# Patient Record
Sex: MALE | Race: WHITE | NOT HISPANIC OR LATINO | Employment: OTHER | URBAN - METROPOLITAN AREA
[De-identification: names, ages, dates, MRNs, and addresses within clinical notes are randomized per-mention and may not be internally consistent; named-entity substitution may affect disease eponyms.]

---

## 2017-03-07 ENCOUNTER — OFFICE VISIT (OUTPATIENT)
Dept: MEDICAL GROUP | Facility: PHYSICIAN GROUP | Age: 57
End: 2017-03-07
Payer: COMMERCIAL

## 2017-03-07 VITALS
BODY MASS INDEX: 31.78 KG/M2 | SYSTOLIC BLOOD PRESSURE: 100 MMHG | RESPIRATION RATE: 16 BRPM | TEMPERATURE: 97.3 F | HEART RATE: 77 BPM | DIASTOLIC BLOOD PRESSURE: 68 MMHG | OXYGEN SATURATION: 98 % | WEIGHT: 222 LBS | HEIGHT: 70 IN

## 2017-03-07 DIAGNOSIS — E55.9 VITAMIN D INSUFFICIENCY: ICD-10-CM

## 2017-03-07 DIAGNOSIS — I10 ESSENTIAL HYPERTENSION: ICD-10-CM

## 2017-03-07 DIAGNOSIS — F33.41 RECURRENT MAJOR DEPRESSIVE DISORDER, IN PARTIAL REMISSION (HCC): ICD-10-CM

## 2017-03-07 DIAGNOSIS — K21.9 GASTROESOPHAGEAL REFLUX DISEASE, ESOPHAGITIS PRESENCE NOT SPECIFIED: ICD-10-CM

## 2017-03-07 DIAGNOSIS — E66.9 OBESITY (BMI 30.0-34.9): ICD-10-CM

## 2017-03-07 DIAGNOSIS — Z76.89 ENCOUNTER TO ESTABLISH CARE: ICD-10-CM

## 2017-03-07 DIAGNOSIS — Z12.5 SCREENING FOR PROSTATE CANCER: ICD-10-CM

## 2017-03-07 DIAGNOSIS — E11.9 TYPE 2 DIABETES MELLITUS WITHOUT COMPLICATION, WITHOUT LONG-TERM CURRENT USE OF INSULIN (HCC): ICD-10-CM

## 2017-03-07 DIAGNOSIS — R51.9 FREQUENT HEADACHES: ICD-10-CM

## 2017-03-07 DIAGNOSIS — Z00.00 ROUTINE HEALTH MAINTENANCE: ICD-10-CM

## 2017-03-07 PROCEDURE — 92250 FUNDUS PHOTOGRAPHY W/I&R: CPT | Mod: TC | Performed by: NURSE PRACTITIONER

## 2017-03-07 PROCEDURE — 99214 OFFICE O/P EST MOD 30 MIN: CPT | Performed by: NURSE PRACTITIONER

## 2017-03-07 RX ORDER — CITALOPRAM 20 MG/1
10 TABLET ORAL DAILY
Qty: 45 TAB | Refills: 3 | Status: SHIPPED | OUTPATIENT
Start: 2017-03-07 | End: 2018-04-17 | Stop reason: SDUPTHER

## 2017-03-07 RX ORDER — AMITRIPTYLINE HYDROCHLORIDE 10 MG/1
10 TABLET, FILM COATED ORAL
Qty: 90 TAB | Refills: 3 | Status: SHIPPED | OUTPATIENT
Start: 2017-03-07 | End: 2017-05-11 | Stop reason: SDUPTHER

## 2017-03-07 RX ORDER — OMEPRAZOLE 20 MG/1
20 CAPSULE, DELAYED RELEASE ORAL DAILY
Qty: 90 CAP | Refills: 3 | Status: SHIPPED | OUTPATIENT
Start: 2017-03-07 | End: 2018-04-17 | Stop reason: SDUPTHER

## 2017-03-07 RX ORDER — LOSARTAN POTASSIUM 100 MG/1
100 TABLET ORAL DAILY
Qty: 90 TAB | Refills: 3 | Status: SHIPPED | OUTPATIENT
Start: 2017-03-07 | End: 2018-04-17 | Stop reason: SDUPTHER

## 2017-03-07 ASSESSMENT — PATIENT HEALTH QUESTIONNAIRE - PHQ9
3. TROUBLE FALLING OR STAYING ASLEEP OR SLEEPING TOO MUCH: 0
1. LITTLE INTEREST OR PLEASURE IN DOING THINGS: 1
2. FEELING DOWN, DEPRESSED, IRRITABLE, OR HOPELESS: 1
9. THOUGHTS THAT YOU WOULD BE BETTER OFF DEAD, OR OF HURTING YOURSELF: 0
SUM OF ALL RESPONSES TO PHQ QUESTIONS 1-9: 3
SUM OF ALL RESPONSES TO PHQ9 QUESTIONS 1 AND 2: 2
7. TROUBLE CONCENTRATING ON THINGS, SUCH AS READING THE NEWSPAPER OR WATCHING TELEVISION: 0
6. FEELING BAD ABOUT YOURSELF - OR THAT YOU ARE A FAILURE OR HAVE LET YOURSELF OR YOUR FAMILY DOWN: 1
8. MOVING OR SPEAKING SO SLOWLY THAT OTHER PEOPLE COULD HAVE NOTICED. OR THE OPPOSITE, BEING SO FIGETY OR RESTLESS THAT YOU HAVE BEEN MOVING AROUND A LOT MORE THAN USUAL: 0
4. FEELING TIRED OR HAVING LITTLE ENERGY: 0
5. POOR APPETITE OR OVEREATING: 0

## 2017-03-07 NOTE — ASSESSMENT & PLAN NOTE
Ongoing problem managed with losartan 100 mg daily. Does not monitor bp at home. Denies any dizziness. +headaches. Denies any chest pain, palpitations, FLORES, or LE edema.

## 2017-03-07 NOTE — ASSESSMENT & PLAN NOTE
Ongoing problem since 2004. He had a fall off of a ladder and had head injury with multiple other injuries. This caused him to fall into bad state of depression. This has been well controlled with citalopram 10 mg daily.    Patient Health Questionaire    Little interest or pleasure in doing things?: 1  Feeling down, depressed, or hopeless?: 1  PHQ 2 Score: 2    If depressive symptoms identified deferred to follow up visit unless specifically addressed in assesment and plan.    Interpretation of PHQ-9 Total Score   Score Severity   1-4 Minimal Depression   5-9 Mild Depression   10-14 Moderate Depression   15-19 Moderately Severe Depression   20-27 Severe Depression

## 2017-03-07 NOTE — PROGRESS NOTES
Subjective:     Chief Complaint   Patient presents with   • Establish Care     med refills, daily headaches       HPI  Miguel Angel Fernández is a 56 y.o. male here today to establish care. The following chronic conditions were discussed:    ACID REFLUX  States he has had heartburn symptoms since 2015. Has been using omeprazole 20 mg daily which has been working very well. Denies any hoarseness or dysphagia. No abd pain or blood in stool.     Type 2 diabetes mellitus without complication, without long-term current use of insulin (CMS-[facility])  Ongoing problem managed with metformin 500 mg BID with meals. Denies any polyuria, polydipsia, polyphagia, blurred or cloudy vision, rash or thrush, or numbness or tingling of feet. Last dilated retinal eye exam was 2 years ago.   He does wish he could learn to eat better. Has a hard time finding time to exercise.   Losartan for renal protection.    Ref. Range 11/7/2015 08:45 7/7/2016 08:26 11/23/2016 07:51   Glycohemoglobin Latest Ref Range: 0.0-5.6 % 6.6 (H) 6.5 (H) 6.3 (H)       HTN (hypertension)  Ongoing problem managed with losartan 100 mg daily. Does not monitor bp at home. Denies any dizziness. +headaches. Denies any chest pain, palpitations, FLORES, or LE edema.     DEPRESSION  Ongoing problem since 2004. He had a fall off of a ladder and had head injury with multiple other injuries. This caused him to fall into bad state of depression. This has been well controlled with citalopram 10 mg daily.    Patient Health Questionaire    Little interest or pleasure in doing things?: 1  Feeling down, depressed, or hopeless?: 1  PHQ 2 Score: 2    If depressive symptoms identified deferred to follow up visit unless specifically addressed in assesment and plan.    Interpretation of PHQ-9 Total Score   Score Severity   1-4 Minimal Depression   5-9 Mild Depression   10-14 Moderate Depression   15-19 Moderately Severe Depression   20-27 Severe Depression      Frequent headaches  Long-standing  problem for years. States this has been going on since prior to his fall with head injury in 2004. He was previously following Dr. Bowser for nerve ablation and botox. Nothing has helped. Patient feels like this one muscle starts throbbing causing pain which radiates into his frontal head region. Pain is worse in mid afternoon. No photophobia/phonophobia, N/V. Treatments tried: multiple tabs of Excedrin daily. He has tried muscle relaxer but cannot recall which one. CT scan has been completed in the past and has been normal. No TIA or stroke-like symptoms, no focal or changing headaches, no facial/extremity paraesthesia, no amaurosis or  diplopia, no speech or swallowing difficulty, no focal or persistent numbness or weakness, no stumbling while walking, gait changes, or dizziness      Obesity (BMI 30.0-34.9)  Patient has been gaining weight over this past year. He feels like he needs assistance with managing his diet and is willing to purchase at the nutrition counseling. He does not routinely exercise         Diagnoses of Encounter to establish care, Frequent headaches, Type 2 diabetes mellitus without complication, without long-term current use of insulin (CMS-[facility]), Gastroesophageal reflux disease, esophagitis presence not specified, Essential hypertension, Recurrent major depressive disorder, in partial remission (CMS-[facility]), Vitamin D insufficiency, Obesity (BMI 30.0-34.9), Routine health maintenance, and Screening for prostate cancer were pertinent to this visit.    Allergies: Review of patient's allergies indicates no known allergies.  Current medicines (including changes today)  Current Outpatient Prescriptions   Medication Sig Dispense Refill   • metformin (GLUCOPHAGE) 500 MG Tab Take 1 Tab by mouth 2 times a day, with meals. 180 Tab 3   • omeprazole (PRILOSEC) 20 MG delayed-release capsule Take 1 Cap by mouth every day. 90 Cap 3   • losartan (COZAAR) 100 MG Tab Take 1 Tab by mouth every day. 90 Tab 3   •  "amitriptyline (ELAVIL) 10 MG Tab Take 1 Tab by mouth every bedtime. 90 Tab 3   • citalopram (CELEXA) 20 MG Tab Take 0.5 Tabs by mouth every day. 45 Tab 3     No current facility-administered medications for this visit.       He  has a past medical history of Hypertension; Heart burn; Hiatus hernia syndrome; Snoring; Rosacea (1/26/2016); Hyperglycemia (1/26/2016); Frequent headaches (1/26/2016); Indigestion; Bowel habit changes; Diabetes (CMS-[facility]); Sleep apnea; and Psychiatric problem.      ROS  As stated in HPI      Objective:     Blood pressure 100/68, pulse 77, temperature 36.3 °C (97.3 °F), resp. rate 16, height 1.778 m (5' 10\"), weight 100.699 kg (222 lb), SpO2 98 %. Body mass index is 31.85 kg/(m^2).  Physical Exam:  General: Alert, oriented, in no acute distress.  Eye contact is good, speech goal directed, affect calm  Neuro Exam: Cranial nerves 2-12 intact, strength intact all four extremities proximal and distal, sensation intact to soft touch all four extremities, gait normal  HEENT: conjunctiva non-injected, sclera non-icteric, EOMs intact.   Oral mucous membranes pink and moist with no lesions. Oropharynx without erythema, or exudate.   Neck: Neck with no visible deformity, edema, or erythema. No cervical lymphadenopathy.  No tenderness or spasm of paraspinous muscles or upper trapezius. No spinous process tenderness.  FAROM without pain through forward flexion, extension, left and right rotation, or ear to shoulder.  equal and strong bilat. FAROM of bilat shoulder and elbow. BUE strength 5/5.  Lungs: unlabored. clear to auscultation bilaterally with good excursion.  CV: regular rate and rhythm. No murmurs. No carotid bruits.   Ext: no edema, normal color and temperature.         Assessment and Plan:   The following treatment plan was discussed  1. Encounter to establish care     2. Frequent headaches  Not controlled. History sounds like they believes headaches were from cervical spine, but treatment " options were not working. Start Elavil and re-eval in 6 weeks. Refer to neurology per patient request   amitriptyline (ELAVIL) 10 MG Tab    REFERRAL TO NEUROLOGY   3. Type 2 diabetes mellitus without complication, without long-term current use of insulin (CMS-HCC)  Controlled on metformin. Continue losartan. Recommended aspirin. Will discuss statin at next visit after I receive updated labs  He will return tomorrow for retinal exam.   REFERRAL TO [facility] (HIP) Services Requested:: Weight Management Program, Registered Dietitian for Medical Nutrition Therapy; Reason for Visit:: Overweight/Obesity, Medical Condition Requiring Nutrition Counseling    metformin (GLUCOPHAGE) 500 MG Tab    HEMOGLOBIN A1C    COMP METABOLIC PANEL    LIPID PROFILE   4. Gastroesophageal reflux disease, esophagitis presence not specified  Stable with PPI  omeprazole (PRILOSEC) 20 MG delayed-release capsule   5. Essential hypertension  Controlled   losartan (COZAAR) 100 MG Tab   6. Recurrent major depressive disorder, in partial remission (CMS-HCC)  Controlled   citalopram (CELEXA) 20 MG Tab   7. Vitamin D insufficiency  VITAMIN D,25 HYDROXY   8. Obesity (BMI 30.0-34.9)  REFERRAL TO [facility] (HIP) Services Requested:: Weight Management Program, Registered Dietitian for Medical Nutrition Therapy; Reason for Visit:: Overweight/Obesity, Medical Condition Requiring Nutrition Counseling   9. Routine health maintenance  CBC WITH DIFFERENTIAL       Followup: Return in about 6 weeks (around 4/18/2017), or 6 wks headache, 6 months chronic disease. sooner should new symptoms or problems arise.

## 2017-03-07 NOTE — ASSESSMENT & PLAN NOTE
Ongoing problem managed with metformin 500 mg BID with meals. Denies any polyuria, polydipsia, polyphagia, blurred or cloudy vision, rash or thrush, or numbness or tingling of feet. Last dilated retinal eye exam was 2 years ago.   He does wish he could learn to eat better. Has a hard time finding time to exercise.   Losartan for renal protection.    Ref. Range 11/7/2015 08:45 7/7/2016 08:26 11/23/2016 07:51   Glycohemoglobin Latest Ref Range: 0.0-5.6 % 6.6 (H) 6.5 (H) 6.3 (H)

## 2017-03-07 NOTE — Clinical Note
March 7, 2017        Miguel Angel Fernández  [address] [place] [address]        Dear Miguel Angel Rutherford:    Please come back this week for a retinal eye exam because of your sugars and risk for eye damage.      Please start aspirin 81 mg every bedtime    For your headaches,   Please start elavil 10 mg every bedtime. Give this a total of 6 weeks to see if this will offer any improvement. I have also referred you to neurology clinic.      You may use Miralax for constipation. Please increase vegetables and prune juice. The nutritionist will also help with this.     If you have any questions or concerns, please don't hesitate to call.        Sincerely,        VIELKA De Jesus.    Electronically Signed

## 2017-03-07 NOTE — ASSESSMENT & PLAN NOTE
Long-standing problem for years. States this has been going on since prior to his fall with head injury in 2004. He was previously following Dr. Bowser for nerve ablation and botox. Nothing has helped. Patient feels like this one muscle starts throbbing causing pain which radiates into his frontal head region. Pain is worse in mid afternoon. No photophobia/phonophobia, N/V. Treatments tried: multiple tabs of Excedrin daily. He has tried muscle relaxer but cannot recall which one. CT scan has been completed in the past and has been normal.

## 2017-03-07 NOTE — MR AVS SNAPSHOT
"        Miguel Angel Fernández   3/7/2017 11:00 AM   Office Visit   MRN: [id number]    Department:  [facility]   Dept Phone:  [phone]    Description:  Male : 1960   Provider:  KLEBER De Jesus           Reason for Visit     Establish Care med refills, daily headaches      Allergies as of 3/7/2017     No Known Allergies      You were diagnosed with     Encounter to establish care   [475019]       Obesity (BMI 30.0-34.9)   [101629]       Gastroesophageal reflux disease, esophagitis presence not specified   [6463063]       Type 2 diabetes mellitus without complication, without long-term current use of insulin (CMS-HCC)   [9384996]       Essential hypertension   [4954309]       Recurrent major depressive disorder, in partial remission (CMS-HCC)   [4599032]       Frequent headaches   [8792338]       Vitamin D insufficiency   [895605]       Routine health maintenance   [490257]         Vital Signs     Blood Pressure Pulse Temperature Respirations Height Weight    100/68 mmHg 77 36.3 °C (97.3 °F) 16 1.778 m (5' 10\") 100.699 kg (222 lb)    Body Mass Index Oxygen Saturation Smoking Status             31.85 kg/m2 98% Former Smoker         Basic Information     Date Of Birth Sex Race Ethnicity Preferred Language    1960 Male White Non- English      Problem List              ICD-10-CM Priority Class Noted - Resolved    Depression F32.9   2010 - Present    Hypogonadism male E29.1   2010 - Present    ED (erectile dysfunction) N52.9   2010 - Present    FRANK on CPAP G47.33   2010 - Present    HTN (hypertension) I10   2010 - Present    Acid reflux K21.9   2010 - Present    Rosacea L71.9   2016 - Present    Type 2 diabetes mellitus without complication, without long-term current use of insulin (CMS-HCC) E11.9   2016 - Present    Frequent headaches R51   2016 - Present    Obesity (BMI 30.0-34.9) E66.9   3/7/2017 - Present      Health Maintenance       " Date Due Completion Dates    IMM HEP B VACCINE (1 of 3 - Primary Series) 1960 ---    RETINAL SCREENING 3/13/1976 ---    COLONOSCOPY 3/13/2008 ---    DIABETES MONOFILAMENT / LE EXAM 3/4/2016 3/4/2015 (N/S)    Override on 3/4/2015: (N/S)    IMM INFLUENZA (1) 9/1/2016 10/14/2015    FASTING LIPID PROFILE 11/7/2016 11/7/2015, 10/8/2012, 9/5/2008    URINE ACR / MICROALBUMIN 12/18/2016 12/18/2015    A1C SCREENING 5/23/2017 11/23/2016, 7/7/2016, 11/7/2015, 6/6/2015, 10/8/2012    SERUM CREATININE 12/23/2017 12/23/2016, 11/23/2016, 9/14/2016, 8/5/2016, 7/7/2016, 11/7/2015, 6/6/2015, 10/8/2012, 8/11/2010, 9/5/2008    IMM DTaP/Tdap/Td Vaccine (2 - Td) 1/26/2026 1/26/2016            Current Immunizations     Influenza Vaccine Quad Inj (Preserved) 10/14/2015    Tdap Vaccine 1/26/2016      Below and/or attached are the medications your provider expects you to take. Review all of your home medications and newly ordered medications with your provider and/or pharmacist. Follow medication instructions as directed by your provider and/or pharmacist. Please keep your medication list with you and share with your provider. Update the information when medications are discontinued, doses are changed, or new medications (including over-the-counter products) are added; and carry medication information at all times in the event of emergency situations     Allergies:  No Known Allergies          Medications  Valid as of: March 07, 2017 - 12:07 PM    Generic Name Brand Name Tablet Size Instructions for use    Amitriptyline HCl (Tab) ELAVIL 10 MG Take 1 Tab by mouth every bedtime.        Citalopram Hydrobromide (Tab) CELEXA 20 MG Take 0.5 Tabs by mouth every day.        Losartan Potassium (Tab) COZAAR 100 MG Take 1 Tab by mouth every day.        MetFORMIN HCl (Tab) GLUCOPHAGE 500 MG Take 1 Tab by mouth 2 times a day, with meals.        Omeprazole (CAPSULE DELAYED RELEASE) PRILOSEC 20 MG Take 1 Cap by mouth every day.        .                    Medicines prescribed today were sent to:     [facility] [place], [place] - [address]    [address] [address]    Phone: [phone] Fax: [phone]    Open 24 Hours?: No      Medication refill instructions:       If your prescription bottle indicates you have medication refills left, it is not necessary to call your provider’s office. Please contact your pharmacy and they will refill your medication.    If your prescription bottle indicates you do not have any refills left, you may request refills at any time through one of the following ways: The online [employer] system (except Urgent Care), by calling your provider’s office, or by asking your pharmacy to contact your provider’s office with a refill request. Medication refills are processed only during regular business hours and may not be available until the next business day. Your provider may request additional information or to have a follow-up visit with you prior to refilling your medication.   *Please Note: Medication refills are assigned a new Rx number when refilled electronically. Your pharmacy may indicate that no refills were authorized even though a new prescription for the same medication is available at the pharmacy. Please request the medicine by name with the pharmacy before contacting your provider for a refill.        Your To Do List     Future Labs/Procedures Complete By Expires    CBC WITH DIFFERENTIAL  As directed 3/8/2018    COMP METABOLIC PANEL  As directed 3/8/2018    HEMOGLOBIN A1C  As directed 3/8/2018    LIPID PROFILE  As directed 3/8/2018    VITAMIN D,25 HYDROXY  As directed 3/8/2018      Referral     A referral request has been sent to our patient care coordination department. Please allow 3-5 business days for us to process this request and contact you either by phone or mail. If you do not hear from us by the 5th business day, please call us at [phone].            [employer] Access Code: [email or web address]  Expires: 4/6/2017 10:50 AM    [employer]  A secure, online tool to manage your health information     [employer]’s [employer]® is a secure, online tool that connects you to your personalized health information from the privacy of your home -- day or night - making it very easy for you to manage your healthcare. Once the activation process is completed, you can even access your medical information using the [employer] elodia, which is available for free in the Apple Elodia store or Google Play store.     [employer] provides the following levels of access (as shown below):   My Chart Features   [facility] Primary Care Doctor [facility]  Specialists [facility]  Urgent  Care Non-[facility]  Primary Care  Doctor   Email your healthcare team securely and privately 24/7 X X X    Manage appointments: schedule your next appointment; view details of past/upcoming appointments X      Request prescription refills. X      View recent personal medical records, including lab and immunizations X X X X   View health record, including health history, allergies, medications X X X X   Read reports about your outpatient visits, procedures, consult and ER notes X X X X   See your discharge summary, which is a recap of your hospital and/or ER visit that includes your diagnosis, lab results, and care plan. X X       How to register for [employer]:  1. Go to  [email or web address].  2. Click on the Sign Up Now box, which takes you to the New Member Sign Up page. You will need to provide the following information:  a. Enter your [employer] Access Code exactly as it appears at the top of this page. (You will not need to use this code after you’ve completed the sign-up process. If you do not sign up before the expiration date, you must request a new code.)   b. Enter your date of birth.   c. Enter your home email address.   d. Click Submit, and follow the next screen’s instructions.  3. Create a [employer] ID. This will be your [employer]  login ID and cannot be changed, so think of one that is secure and easy to remember.  4. Create a [employer] password. You can change your password at any time.  5. Enter your Password Reset Question and Answer. This can be used at a later time if you forget your password.   6. Enter your e-mail address. This allows you to receive e-mail notifications when new information is available in [employer]  7. Click Sign Up. You can now view your health information.    For assistance activating your [employer] account, call [phone]

## 2017-03-07 NOTE — ASSESSMENT & PLAN NOTE
States he has had heartburn symptoms since 2015. Has been using omeprazole 20 mg daily which has been working very well. Denies any hoarseness or dysphagia. No abd pain or blood in stool.

## 2017-03-08 NOTE — ASSESSMENT & PLAN NOTE
Patient has been gaining weight over this past year. He feels like he needs assistance with managing his diet and is willing to purchase at the nutrition counseling. He does not routinely exercise

## 2017-03-20 ENCOUNTER — APPOINTMENT (OUTPATIENT)
Dept: HEALTH INFORMATION MANAGEMENT | Facility: MEDICAL CENTER | Age: 57
End: 2017-03-20
Payer: COMMERCIAL

## 2017-05-11 ENCOUNTER — OFFICE VISIT (OUTPATIENT)
Dept: NEUROLOGY | Facility: MEDICAL CENTER | Age: 57
End: 2017-05-11
Payer: COMMERCIAL

## 2017-05-11 VITALS
BODY MASS INDEX: 31.64 KG/M2 | SYSTOLIC BLOOD PRESSURE: 118 MMHG | DIASTOLIC BLOOD PRESSURE: 86 MMHG | WEIGHT: 221 LBS | HEIGHT: 70 IN | TEMPERATURE: 97.8 F | OXYGEN SATURATION: 95 % | HEART RATE: 86 BPM

## 2017-05-11 DIAGNOSIS — R51.9 FREQUENT HEADACHES: ICD-10-CM

## 2017-05-11 PROCEDURE — 99243 OFF/OP CNSLTJ NEW/EST LOW 30: CPT | Performed by: PHYSICIAN ASSISTANT

## 2017-05-11 RX ORDER — PROMETHAZINE HYDROCHLORIDE 25 MG/1
TABLET ORAL
Qty: 30 TAB | Refills: 11 | Status: SHIPPED | OUTPATIENT
Start: 2017-05-11 | End: 2018-10-08

## 2017-05-11 RX ORDER — AMITRIPTYLINE HYDROCHLORIDE 10 MG/1
10 TABLET, FILM COATED ORAL
Qty: 100 TAB | Refills: 11 | Status: SHIPPED | OUTPATIENT
Start: 2017-05-11 | End: 2017-11-22

## 2017-05-11 RX ORDER — ASCORBIC ACID 500 MG
500 TABLET ORAL DAILY
COMMUNITY
End: 2018-10-25

## 2017-05-11 RX ORDER — SUMATRIPTAN 50 MG/1
TABLET, FILM COATED ORAL
Qty: 9 TAB | Refills: 11 | Status: SHIPPED | OUTPATIENT
Start: 2017-05-11 | End: 2017-06-29 | Stop reason: SDUPTHER

## 2017-05-11 NOTE — PROGRESS NOTES
Subjective:      Miguel Angel Fernández is a 57 y.o. male who presents with New Patient    Chief Complaint/Reason for referral:  headaches    History of Present Illness:   Describe your headaches to me:  Has had headaches his whole life but in the past 11 years (since he moved to [place] from [place]) they have worsened.  In 2004 he fell off ladder and knocked himself out and since then he has had severe problems including depression and worsening of headaches.  Wakes in the night with headaches sometimes and other times his headache comes on during the day.  Moderate to Severe intensity, started more than 3 months ago, last > 4 hours at times.      Location of headaches:? Back of neck on right as well as his forehead    Frequency of headaches?  Not every day but typically daily and come on in the afternoon    Duration of headaches? Sometimes it doesn't go away all day despite taking excedrin    Do you get an aura or any symptoms that typically begin PRIOR to headache onset?    Are you nauseated or sick to your stomach when you have a headache?  n  Does light bother you when you have a headache?   ___n______  Does sound or noise bother you when you have a headache?   ____n_____    Neurologic symptoms with headaches (weakness, numbness, vertigo, speech changes, cognitive changes) no      Do you have any neck or jaw pain with headaches?    No neck injury but when he fell off the ladder he could have hurt his neck some.    Just saw dentist and dentist told him he has evidence of tooth grinding    Have you identified any triggers for your headaches (dehydration, poor sleep, low blood sugar, alcohol 35% (lisa wine) chocolate 22%, cheese 9%, citrus fruit 11%)?  Never noticed but he never kept diary    Do you feel restless like you want to pace around with your headaches or do you feel like lying down to make your headache feel better/less severe?   Worsened with activity    Do headaches start by coughing, sneezing, bending over,  Valsalva maneuver, sexual activity?  no      Do headaches start shortly after you lie down to go to bed or shortly after you get up from bed in the morning?  No pattern, although tendency to have daily headache every afternoon    Have you ever kept a headache diary? no    How many days do you keep ANY type of headache in any given month?  3 or fewer days______   Between 3 and 6 days______   Between 6 and 10 days_____  Between 11 and 14 days____  15 or more headache/days per month__X___  Has severe headaches __6__ days per month    Family members with headaches:  Mom and one sister    Co--morbid conditions:    Conditions that affect diagnosis and treatment:  Depression  Y but controlled now       Anxiety     Yes but has celexa    Sleep disorders:   No - the cpap helps with this       Obesity  Y    History of TBI Y see above           Social History:  Do you drink any caffeine? Yes__X___ No____   How many days per week?  2 or fewer days______  3 or more days__X____    Do you drink alcohol?  Rarely    Do you smoke cigarettes?     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 Works through headaches    Who and where do you live? [place] with your mom who is 90     What is your exercise program: none at this time    Have you had an MRI done?  No     PMH reviewed   Medications and Allergies Reviewed     What are you taking right now for your headaches/#days per month of acute medication use:   excedrin migraine - 7 days per week    Prior acute treatments:  Medication/dose/timing/route/worked or side-effects?  none    What are you taking right now - daily - to prevent headaches?/dose    Elavil 10 mg at bedtime      Any prior prophylactic treatments:  Medications/dose/frequency/duration of treatment/worked or side effects?    none                                                                                                             HPI    ROS       Objective:     /86  "mmHg  Pulse 86  Temp(Src) 36.6 °C (97.8 °F)  Ht 1.778 m (5' 10\")  Wt 100.245 kg (221 lb)  BMI 31.71 kg/m2  SpO2 95%     Physical Exam      Well developed, well nourished - vital signs reviewed  Alert and Oriented x 3, Affect Appropriate, Fund of knowledge within normal limits, Memory intact  Cranial Nerves: PERRL, EOMI without nystagmus or opthalmoplegia,   face symmetric in strength and sensation, no facial droop, tongue midline without atrophy or fasiculations, shoulder shrug is normal bilaterally, speech clear and fluent no aphasia  Motor:  Upper and lower extremities 5/5, equal bilaterally.  No drift.  Sensation: Light touch equal and intact in all extremities, No sensory deficits  DTRs: Normal in both upper and lower extremity.  No spasticity.  Cerebellar:  Finger to nose intact.  No dysmetria.  No tremor.  Gait: normal gait without ataxia.  Negative Romberg            Assessment/Plan:     Plan: New Daily Persistent Headache/Chronic Migraine/Medication Overuse Headache    Acute/Rescue Medications: max use 9 days monthly - use calendar to track and bring to next visit    Triptan - sumatriptan 50 mg at headache onset.  Nausea medication - phenergan 25 mg to take when you need to knock yourself out for unrelenting headache  OK to take excedrin same day as triptan    Daily Preventative Treatment:  Vitamins - handout provided  Daily medicine - increase elavil by 10 mg weekly to target dose of 50 mg at bedtime unless headache frequency is reduced prior  Exercise program:  Begin walking yoga or kody luiz      Other:  ONB and/or Trigger point injection -  Will obtain auth and call you when you received    Alternative medications - MMJ handout provided    Refer to dentist for TMJ evaluation - give you handout and you can call her to schedule an appointment            "

## 2017-05-11 NOTE — PATIENT INSTRUCTIONS
Plan: New Daily Persistent Headache/Chronic Migraine/Medication Overuse Headache    Acute/Rescue Medications: max use 9 days monthly - use calendar to track and bring to next visit    Triptan - sumatriptan 50 mg at headache onset.  Nausea medication - phenergan 25 mg to take when you need to knock yourself out for unrelenting headache  OK to take excedrin same day as triptan    Daily Preventative Treatment:  Vitamins - handout provided  Daily medicine - increase elavil by 10 mg weekly to target dose of 50 mg at bedtime unless headache frequency is reduced prior  Exercise program:  Begin walking yoga or kody luiz      Other:  ONB and/or Trigger point injection -  Will obtain auth and call you when you received    Alternative medications - MMJ handout provided    Refer to dentist for TMJ evaluation - give you handout and you can call her to schedule an appointment

## 2017-05-11 NOTE — MR AVS SNAPSHOT
"        Miguel Angel Fernández   2017 11:00 AM   Office Visit   MRN: [id number]    Department:  Neurology [facility]   Dept Phone:  [phone]    Description:  Male : 1960   Provider:  Alma Delia Olea PA-C           Reason for Visit     New Patient headaches      Allergies as of 2017     No Known Allergies      You were diagnosed with     Frequent headaches   [4109535]       Chronic migraine   [534481]         Vital Signs     Blood Pressure Pulse Temperature Height Weight Body Mass Index    118/86 mmHg 86 36.6 °C (97.8 °F) 1.778 m (5' 10\") 100.245 kg (221 lb) 31.71 kg/m2    Oxygen Saturation Smoking Status                95% Former Smoker          Basic Information     Date Of Birth Sex Race Ethnicity Preferred Language    1960 Male White Non- English      Your appointments     2017  1:00 PM   Follow [facility] Visit [facility]   [facility] (--)    [address], [address]  [facility] [address] [phone]           You will be receiving a confirmation call a few days before your appointment from our automated call confirmation system.              Problem List              ICD-10-CM Priority Class Noted - Resolved    Depression F32.9   2010 - Present    Hypogonadism male E29.1   2010 - Present    ED (erectile dysfunction) N52.9   2010 - Present    FRANK on CPAP G47.33, Z99.89   2010 - Present    HTN (hypertension) I10   2010 - Present    Acid reflux K21.9   2010 - Present    Rosacea L71.9   2016 - Present    Type 2 diabetes mellitus without complication, without long-term current use of insulin (CMS-[facility]) E11.9   2016 - Present    Frequent headaches R51   2016 - Present    Obesity (BMI 30.0-34.9) E66.9   3/7/2017 - Present    Chronic migraine G43.709   2017 - Present      Health Maintenance        Date Due Completion Dates    IMM HEP B VACCINE (1 of 3 - Primary Series) 1960 ---    IMM PNEUMOCOCCAL " 19-64 (ADULT) MEDIUM RISK SERIES (1 of 1 - PPSV23) 3/13/1977 ---    COLONOSCOPY 3/13/2008 ---    DIABETES MONOFILAMENT / LE EXAM 3/4/2016 3/4/2015 (N/S)    Override on 3/4/2015: (N/S)    FASTING LIPID PROFILE 11/7/2016 11/7/2015, 10/8/2012, 9/5/2008    URINE ACR / MICROALBUMIN 12/18/2016 12/18/2015    A1C SCREENING 5/23/2017 11/23/2016, 7/7/2016, 11/7/2015, 6/6/2015, 10/8/2012    SERUM CREATININE 12/23/2017 12/23/2016, 11/23/2016, 9/14/2016, 8/5/2016, 7/7/2016, 11/7/2015, 6/6/2015, 10/8/2012, 8/11/2010, 9/5/2008    RETINAL SCREENING 3/8/2018 3/8/2017, 3/7/2017 (Done)    Override on 3/7/2017: Done    IMM DTaP/Tdap/Td Vaccine (2 - Td) 1/26/2026 1/26/2016            Current Immunizations     Influenza Vaccine Quad Inj (Preserved) 10/14/2015    Tdap Vaccine 1/26/2016      Below and/or attached are the medications your provider expects you to take. Review all of your home medications and newly ordered medications with your provider and/or pharmacist. Follow medication instructions as directed by your provider and/or pharmacist. Please keep your medication list with you and share with your provider. Update the information when medications are discontinued, doses are changed, or new medications (including over-the-counter products) are added; and carry medication information at all times in the event of emergency situations     Allergies:  No Known Allergies          Medications  Valid as of: May 11, 2017 - 11:47 AM    Generic Name Brand Name Tablet Size Instructions for use    Amitriptyline HCl (Tab) ELAVIL 10 MG Take 1 Tab by mouth every bedtime. Increase by 10 mg weekly to target dose of 50 mg.        Ascorbic Acid (Tab) ascorbic acid 500 MG Take 500 mg by mouth every day.        Aspirin-Acetaminophen-Caffeine   Take  by mouth.        Citalopram Hydrobromide (Tab) CELEXA 20 MG Take 0.5 Tabs by mouth every day.        Losartan Potassium (Tab) COZAAR 100 MG Take 1 Tab by mouth every day.        Magnesium Chloride   Take   by mouth.        MetFORMIN HCl (Tab) GLUCOPHAGE 500 MG Take 1 Tab by mouth 2 times a day, with meals.        Omeprazole (CAPSULE DELAYED RELEASE) PRILOSEC 20 MG Take 1 Cap by mouth every day.        Potassium   Take  by mouth.        Promethazine HCl (Tab) PHENERGAN 25 MG 25 mg phenergan once on day with severe migraine.  Will cause sleepiness.  Do not drive.        SUMAtriptan Succinate (Tab) IMITREX 50 MG one tab at onset of headache.  Can repeat once in 24H if headache persists two hours after first dose.        .                 Medicines prescribed today were sent to:     [facility] [place], [place] - [address] [facility] [address] [facility]    [address] [facility] [address]    Phone: [phone] Fax: [phone]    Open 24 Hours?: No      Medication refill instructions:       If your prescription bottle indicates you have medication refills left, it is not necessary to call your provider’s office. Please contact your pharmacy and they will refill your medication.    If your prescription bottle indicates you do not have any refills left, you may request refills at any time through one of the following ways: The online [employer] system (except Urgent Care), by calling your provider’s office, or by asking your pharmacy to contact your provider’s office with a refill request. Medication refills are processed only during regular business hours and may not be available until the next business day. Your provider may request additional information or to have a follow-up visit with you prior to refilling your medication.   *Please Note: Medication refills are assigned a new Rx number when refilled electronically. Your pharmacy may indicate that no refills were authorized even though a new prescription for the same medication is available at the pharmacy. Please request the medicine by name with the pharmacy before contacting your provider for a refill.        Instructions    Plan: New Daily Persistent  Headache/Chronic Migraine/Medication Overuse Headache    Acute/Rescue Medications: max use 9 days monthly - use calendar to track and bring to next visit    Triptan - sumatriptan 50 mg at headache onset.  Nausea medication - phenergan 25 mg to take when you need to knock yourself out for unrelenting headache  OK to take excedrin same day as triptan    Daily Preventative Treatment:  Vitamins - handout provided  Daily medicine - increase elavil by 10 mg weekly to target dose of 50 mg at bedtime unless headache frequency is reduced prior  Exercise program:  Begin walking yoga or kody luiz      Other:  ONB and/or Trigger point injection -  Will obtain auth and call you when you received    Alternative medications - MMJ handout provided    Refer to dentist for TMJ evaluation - give you handout and you can call her to schedule an appointment            [employer] Access Code: Activation code not generated  Current [employer] Status: Active

## 2017-06-28 ENCOUNTER — TELEPHONE (OUTPATIENT)
Dept: NEUROLOGY | Facility: MEDICAL CENTER | Age: 57
End: 2017-06-28

## 2017-06-28 NOTE — TELEPHONE ENCOUNTER
"Pt calling, asking if he can get a rx for sumatriptan to be more than 9 tabs a month. Informed pt that imitrex is only as needed it's not to be taken on a regular basis. He states he is no longer taking amitriptyline he was getting more frequent headaches at night while on this med. He has not followed through with the dentist referral, I gave him the # to dr. collier again. Also he is not interested in getting ONB's he stated he was getting botox and thinks what might have been onb's \"really painful back of neck injections\", With sweet water pain. Pt has a appt coming up in July. Requesting for me to see if he can get in sooner than that. I informed him I will keep an eye out for the same day cancellation appts and call him if one comes available.   "

## 2017-06-29 ENCOUNTER — OFFICE VISIT (OUTPATIENT)
Dept: NEUROLOGY | Facility: MEDICAL CENTER | Age: 57
End: 2017-06-29
Payer: COMMERCIAL

## 2017-06-29 VITALS
OXYGEN SATURATION: 93 % | SYSTOLIC BLOOD PRESSURE: 110 MMHG | HEART RATE: 94 BPM | HEIGHT: 70 IN | TEMPERATURE: 97.7 F | WEIGHT: 220.2 LBS | DIASTOLIC BLOOD PRESSURE: 82 MMHG | BODY MASS INDEX: 31.52 KG/M2

## 2017-06-29 DIAGNOSIS — R41.3 MEMORY CHANGE: ICD-10-CM

## 2017-06-29 PROCEDURE — 99214 OFFICE O/P EST MOD 30 MIN: CPT | Performed by: PHYSICIAN ASSISTANT

## 2017-06-29 RX ORDER — MEMANTINE HYDROCHLORIDE 5 MG-10 MG
1 KIT ORAL SEE ADMIN INSTRUCTIONS
Qty: 49 TAB | Refills: 0 | Status: SHIPPED | OUTPATIENT
Start: 2017-06-29 | End: 2017-08-15 | Stop reason: SDUPTHER

## 2017-06-29 RX ORDER — SUMATRIPTAN 100 MG/1
TABLET, FILM COATED ORAL
Qty: 27 TAB | Refills: 3 | Status: SHIPPED | OUTPATIENT
Start: 2017-06-29 | End: 2017-11-09 | Stop reason: SDUPTHER

## 2017-06-29 ASSESSMENT — PATIENT HEALTH QUESTIONNAIRE - PHQ9: CLINICAL INTERPRETATION OF PHQ2 SCORE: 1

## 2017-06-29 NOTE — PROGRESS NOTES
followup migraine    He started elavil titration and he couldn't tolerate it when he began taking more than 2 nightly.  Cannot do topamax because of hx of kidney stones  depakote he also tried and failed  Tried botox in past but only once        For acute medications:  Sumatriptan 50 mg is working well - max use 9 days monthly    He now has mouth guard    Lost headache diary - still having daily headaches.    botox authorization will be obtained and they will call you to schedule    This patient has chronic daily migraines, defined as having 15 or more headaches days per month over a minimum of the last three months. Episodes last more than 4 hours (untreated).     Previous prophylactic treatments for at least three months have included  anti-epileptics such as topamax and depakote and/or anti-depressants such as elavil. The patient has not responded to any of these treatments. At this point the only option left would be to use BTX injections for chronic migraine.    Will try namenda also while waiting for botox to kick in since last time it didn't work after just one injection.    Total time with this visit:  25   Minutes face-to-face with patient. More than 50% of this visit was spent educating patient on their illness and/or coordinating care, as detailed above

## 2017-06-29 NOTE — MR AVS SNAPSHOT
"        Miguel Angel Fernández   2017 2:20 PM   Office Visit   MRN: [id number]    Department:  Neurology [facility]   Dept Phone:  [phone]    Description:  Male : 1960   Provider:  Alma Delia Olea PA-C           Reason for Visit     Follow-Up migraine      Allergies as of 2017     No Known Allergies      You were diagnosed with     Memory change   [[id number]]       Chronic migraine   [[id number]]         Vital Signs     Blood Pressure Pulse Temperature Height Weight Body Mass Index    110/82 mmHg 94 36.5 °C (97.7 °F) 1.778 m (5' 10\") 99.882 kg (220 lb 3.2 oz) 31.60 kg/m2    Oxygen Saturation Smoking Status                93% Former Smoker          Basic Information     Date Of Birth Sex Race Ethnicity Preferred Language    1960 Male White Non- English      Your appointments     2017  1:40 PM   [facility]   [facility] (--)    [address] [place] [address], [address]  [facility] [address] [phone]           You will be receiving a confirmation call a few days before your appointment from our automated call confirmation system.              Problem List              ICD-10-CM Priority Class Noted - Resolved    Depression F32.9   2010 - Present    Hypogonadism male E29.1   2010 - Present    ED (erectile dysfunction) N52.9   2010 - Present    FRANK on CPAP G47.33, Z99.89   2010 - Present    HTN (hypertension) I10   2010 - Present    Acid reflux K21.9   2010 - Present    Rosacea L71.9   2016 - Present    Type 2 diabetes mellitus without complication, without long-term current use of insulin (CMS-HCC) E11.9   2016 - Present    Frequent headaches R51   2016 - Present    Obesity (BMI 30.0-34.9) E66.9   3/7/2017 - Present    Chronic migraine G43.709   2017 - Present      Health Maintenance        Date Due Completion Dates    IMM HEP B VACCINE (1 of 3 - Primary Series) 1960 ---    IMM PNEUMOCOCCAL " 19-64 (ADULT) MEDIUM RISK SERIES (1 of 1 - PPSV23) 3/13/1977 ---    COLONOSCOPY 3/13/2008 ---    DIABETES MONOFILAMENT / LE EXAM 3/4/2016 3/4/2015 (N/S)    Override on 3/4/2015: (N/S)    FASTING LIPID PROFILE 11/7/2016 11/7/2015, 10/8/2012, 9/5/2008    URINE ACR / MICROALBUMIN 12/18/2016 12/18/2015    A1C SCREENING 5/23/2017 11/23/2016, 7/7/2016, 11/7/2015, 6/6/2015, 10/8/2012    SERUM CREATININE 12/23/2017 12/23/2016, 11/23/2016, 9/14/2016, 8/5/2016, 7/7/2016, 11/7/2015, 6/6/2015, 10/8/2012, 8/11/2010, 9/5/2008    RETINAL SCREENING 3/8/2018 3/8/2017, 3/7/2017 (Done)    Override on 3/7/2017: Done    IMM DTaP/Tdap/Td Vaccine (2 - Td) 1/26/2026 1/26/2016            Current Immunizations     Influenza Vaccine Quad Inj (Preserved) 10/14/2015    Tdap Vaccine 1/26/2016      Below and/or attached are the medications your provider expects you to take. Review all of your home medications and newly ordered medications with your provider and/or pharmacist. Follow medication instructions as directed by your provider and/or pharmacist. Please keep your medication list with you and share with your provider. Update the information when medications are discontinued, doses are changed, or new medications (including over-the-counter products) are added; and carry medication information at all times in the event of emergency situations     Allergies:  No Known Allergies          Medications  Valid as of: June 29, 2017 -  2:45 PM    Generic Name Brand Name Tablet Size Instructions for use    Amitriptyline HCl (Tab) ELAVIL 10 MG Take 1 Tab by mouth every bedtime. Increase by 10 mg weekly to target dose of 50 mg.        Ascorbic Acid (Tab) ascorbic acid 500 MG Take 500 mg by mouth every day.        Ascorbic Acid   Take  by mouth.        Aspirin-Acetaminophen-Caffeine   Take  by mouth.        Citalopram Hydrobromide (Tab) CELEXA 20 MG Take 0.5 Tabs by mouth every day.        Cyanocobalamin   Take  by mouth.        Losartan Potassium (Tab)  COZAAR 100 MG Take 1 Tab by mouth every day.        Magnesium Chloride   Take  by mouth.        Memantine HCl (Tab) Memantine HCl 5 (28)-10 (21) MG Take 1 Tab by mouth See Admin Instructions.        MetFORMIN HCl (Tab) GLUCOPHAGE 500 MG Take 1 Tab by mouth 2 times a day, with meals.        Omeprazole (CAPSULE DELAYED RELEASE) PRILOSEC 20 MG Take 1 Cap by mouth every day.        Potassium   Take  by mouth.        Promethazine HCl (Tab) PHENERGAN 25 MG 25 mg phenergan once on day with severe migraine.  Will cause sleepiness.  Do not drive.        SUMAtriptan Succinate (Tab) IMITREX 100 MG 1/2 to one tab at onset of headache.  Can repeat once in 24H if headache persists two hours after first dose.        .                 Medicines prescribed today were sent to:     [facility] - [place], [place] - [address] [facility]    [address] [facility] [address]    Phone: [phone] Fax: [phone]    Open 24 Hours?: No    [id number] - [place], [place] - [facility] [address]    Phone: [phone] Fax: [phone]    Open 24 Hours?: No      Medication refill instructions:       If your prescription bottle indicates you have medication refills left, it is not necessary to call your provider’s office. Please contact your pharmacy and they will refill your medication.    If your prescription bottle indicates you do not have any refills left, you may request refills at any time through one of the following ways: The online [employer] system (except Urgent Care), by calling your provider’s office, or by asking your pharmacy to contact your provider’s office with a refill request. Medication refills are processed only during regular business hours and may not be available until the next business day. Your provider may request additional information or to have a follow-up visit with you prior to refilling your medication.   *Please Note: Medication  refills are assigned a new Rx number when refilled electronically. Your pharmacy may indicate that no refills were authorized even though a new prescription for the same medication is available at the pharmacy. Please request the medicine by name with the pharmacy before contacting your provider for a refill.           [employer] Access Code: Activation code not generated  Current [employer] Status: Active

## 2017-06-30 DIAGNOSIS — G43.709 CHRONIC MIGRAINE W/O AURA W/O STATUS MIGRAINOSUS, NOT INTRACTABLE: ICD-10-CM

## 2017-07-03 ENCOUNTER — TELEPHONE (OUTPATIENT)
Dept: NEUROLOGY | Facility: MEDICAL CENTER | Age: 57
End: 2017-07-03

## 2017-07-03 NOTE — TELEPHONE ENCOUNTER
Received a fax from pt's local pharmacy, regarding rx for Namenda titration pack, they want more dosing clarification then just see admin instructions.

## 2017-07-05 NOTE — TELEPHONE ENCOUNTER
"Message  Received: Today       HALI Chaparro, Micky Ass't       Caller: Unspecified (2 days ago, 1:24 PM)                     Please call them back as this makes NO sense.  The titration pack comes with detailed instructions and is divided by weeks and how to take it.  That is why I said \"see admin instructions.\"       Alma Delia spoke with the pharmacist and clarified.  "

## 2017-07-24 ENCOUNTER — APPOINTMENT (OUTPATIENT)
Dept: NEUROLOGY | Facility: MEDICAL CENTER | Age: 57
End: 2017-07-24
Payer: COMMERCIAL

## 2017-08-15 ENCOUNTER — OFFICE VISIT (OUTPATIENT)
Dept: NEUROLOGY | Facility: MEDICAL CENTER | Age: 57
End: 2017-08-15
Payer: COMMERCIAL

## 2017-08-15 VITALS
WEIGHT: 220.9 LBS | HEART RATE: 85 BPM | TEMPERATURE: 97.7 F | HEIGHT: 70 IN | BODY MASS INDEX: 31.62 KG/M2 | SYSTOLIC BLOOD PRESSURE: 114 MMHG | DIASTOLIC BLOOD PRESSURE: 82 MMHG | OXYGEN SATURATION: 94 %

## 2017-08-15 DIAGNOSIS — R41.3 MEMORY CHANGE: ICD-10-CM

## 2017-08-15 PROCEDURE — 64615 CHEMODENERV MUSC MIGRAINE: CPT | Performed by: PHYSICIAN ASSISTANT

## 2017-08-15 RX ORDER — MEMANTINE HYDROCHLORIDE 10 MG/1
10 TABLET ORAL 2 TIMES DAILY
Qty: 60 TAB | Refills: 11 | Status: SHIPPED | OUTPATIENT
Start: 2017-08-15 | End: 2018-10-08

## 2017-08-15 RX ORDER — ZOLMITRIPTAN 5 MG/1
1 SPRAY NASAL
Qty: 10 EACH | Refills: 11 | Status: SHIPPED | OUTPATIENT
Start: 2017-08-15 | End: 2018-05-09

## 2017-08-15 RX ORDER — MEMANTINE HYDROCHLORIDE 5 MG-10 MG
1 KIT ORAL SEE ADMIN INSTRUCTIONS
Qty: 49 TAB | Refills: 0 | Status: SHIPPED | OUTPATIENT
Start: 2017-08-15 | End: 2018-05-09

## 2017-08-15 RX ORDER — KETOROLAC TROMETHAMINE 30 MG/ML
60 INJECTION, SOLUTION INTRAMUSCULAR; INTRAVENOUS ONCE
Status: COMPLETED | OUTPATIENT
Start: 2017-08-15 | End: 2017-08-15

## 2017-08-15 RX ADMIN — KETOROLAC TROMETHAMINE 60 MG: 30 INJECTION, SOLUTION INTRAMUSCULAR; INTRAVENOUS at 17:13

## 2017-08-15 NOTE — PROGRESS NOTES
I treated this patient in clinic today with BotoxA 155 units according to the dosing/injection paradigm currently mandated by the FDA for the management of chronic migraine. Specifically, I injected 5 units to the procerus, 5 units to the corrugators bilaterally, a total of 20 units to the frontalis musculature, 20 units to the temporalis bilaterally, 15 units to the occipitalis bilaterally, 10 units to the cervical paraspinals bilaterally and 15 units to the trapezius musculature bilaterally. The remainder of the Botox 200 units mixed but not administered was discarded as wastage per FDA guidelines.    MA administered toradol

## 2017-08-15 NOTE — MR AVS SNAPSHOT
"        Miguel Angel Fernández   8/15/2017 4:40 PM   Office Visit   MRN: [id number]    Department:  Neurology [facility]   Dept Phone:  [phone]    Description:  Male : 1960   Provider:  Alma Delia Olea PA-C           Reason for Visit     Botox Injection chronic migraine      Allergies as of 8/15/2017     No Known Allergies      You were diagnosed with     Chronic migraine   [807537]       Memory change   [128477]         Vital Signs     Blood Pressure Pulse Temperature Height Weight Body Mass Index    114/82 mmHg 85 36.5 °C (97.7 °F) 1.778 m (5' 10\") 100.2 kg (220 lb 14.4 oz) 31.70 kg/m2    Oxygen Saturation Smoking Status                94% Former Smoker          Basic Information     Date Of Birth Sex Race Ethnicity Preferred Language    1960 Male White Non- English      Your appointments     Aug 23, 2017 11:20 AM   Follow Up Visit with [facility]   [facility] (--)    [address] [place] [address], [address]  [facility] [address] [phone]           You will be receiving a confirmation call a few days before your appointment from our automated call confirmation system.              Problem List              ICD-10-CM Priority Class Noted - Resolved    Depression F32.9   2010 - Present    Hypogonadism male E29.1   2010 - Present    ED (erectile dysfunction) N52.9   2010 - Present    FRANK on CPAP G47.33, Z99.89   2010 - Present    HTN (hypertension) I10   2010 - Present    Acid reflux K21.9   2010 - Present    Rosacea L71.9   2016 - Present    Type 2 diabetes mellitus without complication, without long-term current use of insulin (CMS-HCC) E11.9   2016 - Present    Frequent headaches R51   2016 - Present    Obesity (BMI 30.0-34.9) E66.9   3/7/2017 - Present    Chronic migraine G43.709   2017 - Present      Health Maintenance        Date Due Completion Dates    IMM HEP B VACCINE (1 of 3 - Primary Series) 1960 ---    IMM " PNEUMOCOCCAL 19-64 (ADULT) MEDIUM RISK SERIES (1 of 1 - PPSV23) 3/13/1977 ---    COLONOSCOPY 3/13/2008 ---    DIABETES MONOFILAMENT / LE EXAM 3/4/2016 3/4/2015 (N/S)    Override on 3/4/2015: (N/S)    FASTING LIPID PROFILE 11/7/2016 11/7/2015, 10/8/2012, 9/5/2008    URINE ACR / MICROALBUMIN 12/18/2016 12/18/2015    A1C SCREENING 5/23/2017 11/23/2016, 7/7/2016, 11/7/2015, 6/6/2015, 10/8/2012    IMM INFLUENZA (1) 9/1/2017 10/14/2015    SERUM CREATININE 12/23/2017 12/23/2016, 11/23/2016, 9/14/2016, 8/5/2016, 7/7/2016, 11/7/2015, 6/6/2015, 10/8/2012, 8/11/2010, 9/5/2008    RETINAL SCREENING 3/8/2018 3/8/2017, 3/7/2017 (Done)    Override on 3/7/2017: Done    IMM DTaP/Tdap/Td Vaccine (2 - Td) 1/26/2026 1/26/2016            Current Immunizations     Influenza Vaccine Quad Inj (Preserved) 10/14/2015    Tdap Vaccine 1/26/2016      Below and/or attached are the medications your provider expects you to take. Review all of your home medications and newly ordered medications with your provider and/or pharmacist. Follow medication instructions as directed by your provider and/or pharmacist. Please keep your medication list with you and share with your provider. Update the information when medications are discontinued, doses are changed, or new medications (including over-the-counter products) are added; and carry medication information at all times in the event of emergency situations     Allergies:  No Known Allergies          Medications  Valid as of: August 15, 2017 -  5:15 PM    Generic Name Brand Name Tablet Size Instructions for use    Amitriptyline HCl (Tab) ELAVIL 10 MG Take 1 Tab by mouth every bedtime. Increase by 10 mg weekly to target dose of 50 mg.        Ascorbic Acid (Tab) ascorbic acid 500 MG Take 500 mg by mouth every day.        Ascorbic Acid   Take  by mouth.        Aspirin-Acetaminophen-Caffeine   Take  by mouth.        Citalopram Hydrobromide (Tab) CELEXA 20 MG Take 0.5 Tabs by mouth every day.         Cyanocobalamin   Take  by mouth.        Losartan Potassium (Tab) COZAAR 100 MG Take 1 Tab by mouth every day.        Magnesium Chloride   Take  by mouth.        Memantine HCl (Tab) Memantine HCl 5 (28)-10 (21) MG Take 1 Tab by mouth See Admin Instructions.        Memantine HCl (Tab) NAMENDA 10 MG Take 1 Tab by mouth 2 times a day.        MetFORMIN HCl (Tab) GLUCOPHAGE 500 MG Take 1 Tab by mouth 2 times a day, with meals.        Omeprazole (CAPSULE DELAYED RELEASE) PRILOSEC 20 MG Take 1 Cap by mouth every day.        Potassium   Take  by mouth.        Promethazine HCl (Tab) PHENERGAN 25 MG 25 mg phenergan once on day with severe migraine.  Will cause sleepiness.  Do not drive.        SUMAtriptan Succinate (Tab) IMITREX 100 MG 1/2 to one tab at onset of headache.  Can repeat once in 24H if headache persists two hours after first dose.        ZOLMitriptan (Solution) ZOMIG 5 MG Spray 1 Spray in nose Once PRN for Headache for up to 1 dose.        .                 Medicines prescribed today were sent to:     [facility] - [facility] [address] [facility]    [address] [facility] [address]    Phone: [phone] Fax: [phone]    Open 24 Hours?: No    [facility] #[id number] - [address] AT [address] [address]    Phone: [phone] Fax: [phone]    Open 24 Hours?: No      Medication refill instructions:       If your prescription bottle indicates you have medication refills left, it is not necessary to call your provider’s office. Please contact your pharmacy and they will refill your medication.    If your prescription bottle indicates you do not have any refills left, you may request refills at any time through one of the following ways: The online [employer] system (except Urgent Care), by calling your provider’s office, or by asking your pharmacy to contact your provider’s office with a refill request. Medication refills are  processed only during regular business hours and may not be available until the next business day. Your provider may request additional information or to have a follow-up visit with you prior to refilling your medication.   *Please Note: Medication refills are assigned a new Rx number when refilled electronically. Your pharmacy may indicate that no refills were authorized even though a new prescription for the same medication is available at the pharmacy. Please request the medicine by name with the pharmacy before contacting your provider for a refill.           [email or web address] Access Code: Activation code not generated  Current [email or web address] Status: Active

## 2017-11-07 ENCOUNTER — OFFICE VISIT (OUTPATIENT)
Dept: NEUROLOGY | Facility: MEDICAL CENTER | Age: 57
End: 2017-11-07
Payer: COMMERCIAL

## 2017-11-07 VITALS
WEIGHT: 221.6 LBS | TEMPERATURE: 98.6 F | HEIGHT: 70 IN | SYSTOLIC BLOOD PRESSURE: 130 MMHG | BODY MASS INDEX: 31.73 KG/M2 | OXYGEN SATURATION: 92 % | HEART RATE: 80 BPM | DIASTOLIC BLOOD PRESSURE: 92 MMHG

## 2017-11-07 PROCEDURE — 64615 CHEMODENERV MUSC MIGRAINE: CPT | Performed by: PHYSICIAN ASSISTANT

## 2017-11-08 NOTE — PROGRESS NOTES
I treated this patient in clinic today with BotoxA 155 units according to the dosing/injection paradigm currently mandated by the FDA for the management of chronic migraine. Specifically, I injected 5 units to the procerus, 5 units to the corrugators bilaterally, a total of 20 units to the frontalis musculature, 20 units to the temporalis bilaterally, 15 units to the occipitalis bilaterally, 10 units to the cervical paraspinals bilaterally and 15 units to the trapezius musculature bilaterally.    I also injected an additional 10 units into the occipital belly of the occipitofrontalis each side because pt complained of reduced efficacy or effect wearing off prior to next dose.    The remainder of the Botox 200 units mixed but not administered was discarded as wastage per FDA guidelines.

## 2017-11-09 RX ORDER — SUMATRIPTAN 100 MG/1
TABLET, FILM COATED ORAL
Qty: 27 TAB | Refills: 3 | Status: SHIPPED | OUTPATIENT
Start: 2017-11-09 | End: 2018-05-01 | Stop reason: SDUPTHER

## 2017-11-22 ENCOUNTER — OFFICE VISIT (OUTPATIENT)
Dept: MEDICAL GROUP | Facility: PHYSICIAN GROUP | Age: 57
End: 2017-11-22
Payer: COMMERCIAL

## 2017-11-22 VITALS
HEIGHT: 70 IN | BODY MASS INDEX: 31.64 KG/M2 | HEART RATE: 76 BPM | SYSTOLIC BLOOD PRESSURE: 122 MMHG | DIASTOLIC BLOOD PRESSURE: 76 MMHG | TEMPERATURE: 97.7 F | RESPIRATION RATE: 12 BRPM | WEIGHT: 221 LBS | OXYGEN SATURATION: 92 %

## 2017-11-22 DIAGNOSIS — Z12.11 SCREEN FOR COLON CANCER: ICD-10-CM

## 2017-11-22 DIAGNOSIS — E11.9 TYPE 2 DIABETES MELLITUS WITHOUT COMPLICATION, WITHOUT LONG-TERM CURRENT USE OF INSULIN (HCC): ICD-10-CM

## 2017-11-22 DIAGNOSIS — G47.33 OSA ON CPAP: ICD-10-CM

## 2017-11-22 DIAGNOSIS — Z23 NEED FOR VACCINATION: ICD-10-CM

## 2017-11-22 DIAGNOSIS — R21 RASH AND NONSPECIFIC SKIN ERUPTION: ICD-10-CM

## 2017-11-22 PROCEDURE — 90472 IMMUNIZATION ADMIN EACH ADD: CPT | Performed by: NURSE PRACTITIONER

## 2017-11-22 PROCEDURE — 90471 IMMUNIZATION ADMIN: CPT | Performed by: NURSE PRACTITIONER

## 2017-11-22 PROCEDURE — 90686 IIV4 VACC NO PRSV 0.5 ML IM: CPT | Performed by: NURSE PRACTITIONER

## 2017-11-22 PROCEDURE — 99214 OFFICE O/P EST MOD 30 MIN: CPT | Mod: 25 | Performed by: NURSE PRACTITIONER

## 2017-11-22 PROCEDURE — 90746 HEPB VACCINE 3 DOSE ADULT IM: CPT | Performed by: NURSE PRACTITIONER

## 2017-11-22 NOTE — LETTER
[facility]  VINAY OliverosRBUBBA.  [address] [place] [address]  Fax: [phone]   Authorization for Release/Disclosure of   Protected Health Information   Name: VERONICA DAVIS : 1960 SSN: [id number]   Address[address] [place], [place] [address] [place] [address] Phone:    [phone] (home)    I authorize the entity listed below to release/disclose the PHI below to:   [facility]/KLEBER Oliveros and KLEBER Oliveros   Provider or Entity Name:  [facility]   Address   City, State, Zip   Phone:      Fax:     Reason for request: continuity of care   Information to be released:    [  ] LAST COLONOSCOPY,  including any PATH REPORT and follow-up  [  ] LAST FIT/COLOGUARD RESULT [  ] LAST DEXA  [  ] LAST MAMMOGRAM  [  ] LAST PAP  [  ] LAST LABS [  ] RETINA EXAM REPORT  [  ] IMMUNIZATION RECORDS  [XX] Release all info      [  ] Check here and initial the line next to each item to release ALL health information INCLUDING  _____ Care and treatment for drug and / or alcohol abuse  _____ HIV testing, infection status, or AIDS  _____ Genetic Testing    DATES OF SERVICE OR TIME PERIOD TO BE DISCLOSED: _____________  I understand and acknowledge that:  * This Authorization may be revoked at any time by you in writing, except if your health information has already been used or disclosed.  * Your health information that will be used or disclosed as a result of you signing this authorization could be re-disclosed by the recipient. If this occurs, your re-disclosed health information may no longer be protected by State or Federal laws.  * You may refuse to sign this Authorization. Your refusal will not affect your ability to obtain treatment.  * This Authorization becomes effective upon signing and will  on (date) __________.      If no date is indicated, this Authorization will  one (1) year from the signature date.    Name: Veronica Davis    Signature:   Date:       11/22/2017       PLEASE FAX REQUESTED RECORDS BACK TO: [phone]

## 2017-11-22 NOTE — ASSESSMENT & PLAN NOTE
Chronic problem since head injury 2004, ongoing uncontrolled. Following neurology. Currently on Botox. Nothing seems to be helping except Imitrex.

## 2017-11-22 NOTE — ASSESSMENT & PLAN NOTE
Symptoms started months ago. He has a rash across upper nack and scalp, lower legs, and forearms. He is following dermatology for this right now, appointment scheduled next Tuesday

## 2017-11-22 NOTE — ASSESSMENT & PLAN NOTE
Long-standing problem of FRANK managed with CPAP, previously followed by Dr. Melendez and Ayala, but insurance is not going to go there anymore and he needs referral to new pulmonologist.

## 2017-11-22 NOTE — PROGRESS NOTES
Subjective:     Chief Complaint   Patient presents with   • Referral Needed     pulmonology referral, GI referral for screening       HPI  Miguel Angel Fernández is a 57 y.o. male here today for referral request. He has not got labs done from March that I ordered    Type 2 diabetes mellitus without complication, without long-term current use of insulin (CMS-HCC)  He has hx of diabetes treated with metformin, but has not had labs done that were ordered back in March to determine status.     FRANK on CPAP  Long-standing problem of FRANK managed with CPAP, previously followed by Dr. Melendez and Ayala, but insurance is not going to go there anymore and he needs referral to new pulmonologist.     Frequent headaches  Chronic problem since head injury 2004, ongoing uncontrolled. Following neurology. Currently on Botox. Nothing seems to be helping except Imitrex.     Rash and nonspecific skin eruption  Symptoms started months ago. He has a rash across upper nack and scalp, lower legs, and forearms. He is following dermatology for this right now, appointment scheduled next Tuesday       Diagnoses of Type 2 diabetes mellitus without complication, without long-term current use of insulin (CMS-HCC), FRANK on CPAP, Chronic migraine, Rash and nonspecific skin eruption, BMI 31.0-31.9,adult, Need for vaccination, and Screen for colon cancer were pertinent to this visit.    Allergies: Patient has no known allergies.  Current medicines (including changes today)  Current Outpatient Prescriptions   Medication Sig Dispense Refill   • Cyanocobalamin (VITAMIN B 12 PO) Take  by mouth.     • Ascorbic Acid (VITAMIN C PO) Take  by mouth.     • Magnesium Chloride (MAGNESIUM DR PO) Take  by mouth.     • Potassium (POTASSIMIN PO) Take  by mouth.     • ascorbic acid (ASCORBIC ACID) 500 MG Tab Take 500 mg by mouth every day.     • Aspirin-Acetaminophen-Caffeine (EXCEDRIN MIGRAINE PO) Take  by mouth.     • promethazine (PHENERGAN) 25 MG Tab 25 mg phenergan  "once on day with severe migraine.  Will cause sleepiness.  Do not drive. 30 Tab 11   • metformin (GLUCOPHAGE) 500 MG Tab Take 1 Tab by mouth 2 times a day, with meals. 180 Tab 3   • omeprazole (PRILOSEC) 20 MG delayed-release capsule Take 1 Cap by mouth every day. 90 Cap 3   • losartan (COZAAR) 100 MG Tab Take 1 Tab by mouth every day. 90 Tab 3   • citalopram (CELEXA) 20 MG Tab Take 0.5 Tabs by mouth every day. 45 Tab 3   • sumatriptan (IMITREX) 100 MG tablet 1/2 to one tab at onset of headache.  Can repeat once in 24H if headache persists two hours after first dose. 27 Tab 3   • Memantine HCl (NAMENDA TITRATION BELL) 5 (28)-10 (21) MG Tab Take 1 Tab by mouth See Admin Instructions. 49 Tab 0   • memantine (NAMENDA) 10 MG Tab Take 1 Tab by mouth 2 times a day. 60 Tab 11   • zolmitriptan (ZOMIG) 5 MG nasal solution Spray 1 Spray in nose Once PRN for Headache for up to 1 dose. 10 Each 11     No current facility-administered medications for this visit.        He  has a past medical history of Bowel habit changes; Diabetes (CMS-HCC); Frequent headaches (1/26/2016); Heart burn; Hiatus hernia syndrome; Hyperglycemia (1/26/2016); Hypertension; Indigestion; Psychiatric problem; Rosacea (1/26/2016); Sleep apnea; and Snoring.      ROS  As stated in HPI and additionally  Neuro: +migraine daily. No dizziness  CV: No chest pain, FLORES, LE edema  Pulm: No sob or dyspnea     Objective:     Blood pressure 122/76, pulse 76, temperature 36.5 °C (97.7 °F), resp. rate 12, height 1.778 m (5' 10\"), weight 100.2 kg (221 lb), SpO2 92 %. Body mass index is 31.71 kg/m².  Physical Exam:  General: Alert, oriented, in no acute distress.  Eye contact is good, speech goal directed, affect calm  CNs grossly intact.  HEENT: conjunctiva non-injected, sclera non-icteric, EOMs intact. No lid edema or eye drainage.   Gross hearing intact.  Lungs: unlabored. clear to auscultation bilaterally with good excursion.  CV: regular rate and rhythm. No murmurs. "   Ext: no edema, normal color and temperature.   Skin: solitary lesions appearance as healing wounds scattered across dorsal aspect of forearms and anterior lower legs. No erythema   Gait steady.     Assessment and Plan:   Assessment/Plan:  1. Type 2 diabetes mellitus without complication, without long-term current use of insulin (CMS-HCC)  Status unknown. Check labs     2. FRANK on CPAP  Stable just needs to establish with new pulmonolgist  - REFERRAL TO PULMONOLOGY    3. Chronic migraine  Not controlled continue f/u with neurology     4. Rash and nonspecific skin eruption  Continue f/u with derm. Records requested  - TSH WITH REFLEX TO FT4; Future    5. BMI 31.0-31.9,adult  - Patient identified as having weight management issue.  Appropriate orders and counseling given.    6. Need for vaccination  I have placed the below orders and discussed them with an approved delegating provider. The MA is performing the below orders under the direction of Dr. Machuca  - INFLUENZA VACCINE QUAD INJ >3Y(PF)  - HEPATITIS B VACCINE ADULT IM    7. Screen for colon cancer  - REFERRAL TO GI FOR COLONOSCOPY       Follow up:  Return pending labs .    Educated in proper administration of medication(s) ordered today including safety, possible SE, risks, benefits, rationale and alternatives to therapy.   Supportive care, differential diagnoses, and indications for immediate follow-up discussed with patient.    Pathogenesis of diagnosis discussed including typical length and natural progression.    Instructed to return to clinic or nearest emergency department for any change in condition, further concerns, or worsening of symptoms.  Patient states understanding of the plan of care and discharge instructions.      Please note that this dictation was created using voice recognition software. I have made every reasonable attempt to correct obvious errors, but I expect that there are errors of grammar and possibly content that I did not discover  before finalizing the note.    Followup: Return pending labs . sooner should new symptoms or problems arise.

## 2017-11-22 NOTE — ASSESSMENT & PLAN NOTE
He has hx of diabetes treated with metformin, but has not had labs done that were ordered back in March to determine status.

## 2017-11-25 ENCOUNTER — HOSPITAL ENCOUNTER (OUTPATIENT)
Dept: LAB | Facility: MEDICAL CENTER | Age: 57
End: 2017-11-25
Attending: NURSE PRACTITIONER
Payer: COMMERCIAL

## 2017-11-25 DIAGNOSIS — R21 RASH AND NONSPECIFIC SKIN ERUPTION: ICD-10-CM

## 2017-11-25 DIAGNOSIS — E11.9 TYPE 2 DIABETES MELLITUS WITHOUT COMPLICATION, WITHOUT LONG-TERM CURRENT USE OF INSULIN (HCC): ICD-10-CM

## 2017-11-25 DIAGNOSIS — Z00.00 ROUTINE HEALTH MAINTENANCE: ICD-10-CM

## 2017-11-25 DIAGNOSIS — E55.9 VITAMIN D INSUFFICIENCY: ICD-10-CM

## 2017-11-25 DIAGNOSIS — Z12.5 SCREENING FOR PROSTATE CANCER: ICD-10-CM

## 2017-11-25 LAB
25(OH)D3 SERPL-MCNC: 34 NG/ML (ref 30–100)
ALBUMIN SERPL BCP-MCNC: 4.1 G/DL (ref 3.2–4.9)
ALBUMIN/GLOB SERPL: 1.4 G/DL
ALP SERPL-CCNC: 111 U/L (ref 30–99)
ALT SERPL-CCNC: 58 U/L (ref 2–50)
ANION GAP SERPL CALC-SCNC: 6 MMOL/L (ref 0–11.9)
AST SERPL-CCNC: 45 U/L (ref 12–45)
BASOPHILS # BLD AUTO: 1 % (ref 0–1.8)
BASOPHILS # BLD: 0.07 K/UL (ref 0–0.12)
BILIRUB SERPL-MCNC: 0.3 MG/DL (ref 0.1–1.5)
BUN SERPL-MCNC: 25 MG/DL (ref 8–22)
CALCIUM SERPL-MCNC: 10.1 MG/DL (ref 8.5–10.5)
CHLORIDE SERPL-SCNC: 106 MMOL/L (ref 96–112)
CHOLEST SERPL-MCNC: 142 MG/DL (ref 100–199)
CO2 SERPL-SCNC: 28 MMOL/L (ref 20–33)
CREAT SERPL-MCNC: 1.04 MG/DL (ref 0.5–1.4)
CREAT UR-MCNC: 400.8 MG/DL
EOSINOPHIL # BLD AUTO: 0.34 K/UL (ref 0–0.51)
EOSINOPHIL NFR BLD: 4.9 % (ref 0–6.9)
ERYTHROCYTE [DISTWIDTH] IN BLOOD BY AUTOMATED COUNT: 43.8 FL (ref 35.9–50)
EST. AVERAGE GLUCOSE BLD GHB EST-MCNC: 140 MG/DL
GFR SERPL CREATININE-BSD FRML MDRD: >60 ML/MIN/1.73 M 2
GLOBULIN SER CALC-MCNC: 3 G/DL (ref 1.9–3.5)
GLUCOSE SERPL-MCNC: 104 MG/DL (ref 65–99)
HBA1C MFR BLD: 6.5 % (ref 0–5.6)
HCT VFR BLD AUTO: 48.4 % (ref 42–52)
HDLC SERPL-MCNC: 35 MG/DL
HGB BLD-MCNC: 15.9 G/DL (ref 14–18)
IMM GRANULOCYTES # BLD AUTO: 0.02 K/UL (ref 0–0.11)
IMM GRANULOCYTES NFR BLD AUTO: 0.3 % (ref 0–0.9)
LDLC SERPL CALC-MCNC: 88 MG/DL
LYMPHOCYTES # BLD AUTO: 2.49 K/UL (ref 1–4.8)
LYMPHOCYTES NFR BLD: 35.6 % (ref 22–41)
MCH RBC QN AUTO: 30.3 PG (ref 27–33)
MCHC RBC AUTO-ENTMCNC: 32.9 G/DL (ref 33.7–35.3)
MCV RBC AUTO: 92.2 FL (ref 81.4–97.8)
MICROALBUMIN UR-MCNC: 3.1 MG/DL
MICROALBUMIN/CREAT UR: 8 MG/G (ref 0–30)
MONOCYTES # BLD AUTO: 0.98 K/UL (ref 0–0.85)
MONOCYTES NFR BLD AUTO: 14 % (ref 0–13.4)
NEUTROPHILS # BLD AUTO: 3.1 K/UL (ref 1.82–7.42)
NEUTROPHILS NFR BLD: 44.2 % (ref 44–72)
NRBC # BLD AUTO: 0 K/UL
NRBC BLD AUTO-RTO: 0 /100 WBC
PLATELET # BLD AUTO: 189 K/UL (ref 164–446)
PMV BLD AUTO: 13.1 FL (ref 9–12.9)
POTASSIUM SERPL-SCNC: 4.4 MMOL/L (ref 3.6–5.5)
PROT SERPL-MCNC: 7.1 G/DL (ref 6–8.2)
PSA SERPL-MCNC: 1.12 NG/ML (ref 0–4)
RBC # BLD AUTO: 5.25 M/UL (ref 4.7–6.1)
SODIUM SERPL-SCNC: 140 MMOL/L (ref 135–145)
TRIGL SERPL-MCNC: 97 MG/DL (ref 0–149)
TSH SERPL DL<=0.005 MIU/L-ACNC: 1.65 UIU/ML (ref 0.3–3.7)
WBC # BLD AUTO: 7 K/UL (ref 4.8–10.8)

## 2017-11-25 PROCEDURE — 84153 ASSAY OF PSA TOTAL: CPT

## 2017-11-25 PROCEDURE — 36415 COLL VENOUS BLD VENIPUNCTURE: CPT

## 2017-11-25 PROCEDURE — 82306 VITAMIN D 25 HYDROXY: CPT

## 2017-11-25 PROCEDURE — 83036 HEMOGLOBIN GLYCOSYLATED A1C: CPT

## 2017-11-25 PROCEDURE — 82043 UR ALBUMIN QUANTITATIVE: CPT

## 2017-11-25 PROCEDURE — 84443 ASSAY THYROID STIM HORMONE: CPT

## 2017-11-25 PROCEDURE — 82570 ASSAY OF URINE CREATININE: CPT

## 2017-11-25 PROCEDURE — 80053 COMPREHEN METABOLIC PANEL: CPT

## 2017-11-25 PROCEDURE — 85025 COMPLETE CBC W/AUTO DIFF WBC: CPT

## 2017-11-25 PROCEDURE — 80061 LIPID PANEL: CPT

## 2017-12-29 ENCOUNTER — OFFICE VISIT (OUTPATIENT)
Dept: URGENT CARE | Facility: PHYSICIAN GROUP | Age: 57
End: 2017-12-29
Payer: COMMERCIAL

## 2017-12-29 ENCOUNTER — HOSPITAL ENCOUNTER (OUTPATIENT)
Dept: RADIOLOGY | Facility: MEDICAL CENTER | Age: 57
End: 2017-12-29
Attending: NURSE PRACTITIONER
Payer: COMMERCIAL

## 2017-12-29 VITALS
HEIGHT: 70 IN | BODY MASS INDEX: 30.78 KG/M2 | TEMPERATURE: 99.4 F | WEIGHT: 215 LBS | SYSTOLIC BLOOD PRESSURE: 120 MMHG | DIASTOLIC BLOOD PRESSURE: 78 MMHG | HEART RATE: 109 BPM | RESPIRATION RATE: 14 BRPM | OXYGEN SATURATION: 93 %

## 2017-12-29 DIAGNOSIS — R51.9 ACUTE NONINTRACTABLE HEADACHE, UNSPECIFIED HEADACHE TYPE: ICD-10-CM

## 2017-12-29 DIAGNOSIS — R45.851 SUICIDAL IDEATION: ICD-10-CM

## 2017-12-29 DIAGNOSIS — R50.9 FEVER IN ADULT: ICD-10-CM

## 2017-12-29 DIAGNOSIS — R06.02 SHORTNESS OF BREATH: ICD-10-CM

## 2017-12-29 DIAGNOSIS — R06.02 SOB (SHORTNESS OF BREATH): ICD-10-CM

## 2017-12-29 LAB
FLUAV+FLUBV AG SPEC QL IA: NORMAL
INT CON NEG: NORMAL
INT CON POS: NORMAL

## 2017-12-29 PROCEDURE — 87804 INFLUENZA ASSAY W/OPTIC: CPT | Performed by: NURSE PRACTITIONER

## 2017-12-29 PROCEDURE — 71020 DX-CHEST-2 VIEWS: CPT

## 2017-12-29 PROCEDURE — 99214 OFFICE O/P EST MOD 30 MIN: CPT | Performed by: NURSE PRACTITIONER

## 2017-12-30 NOTE — PROGRESS NOTES
Chief Complaint   Patient presents with   • Migraine     body aches, chills,  x1 week        HISTORY OF PRESENT ILLNESS: Patient is a 57 y.o. male who presents today due to symptoms which started one week ago. Pt reports a fever, chills, body aches. Reports associated shortness of breath, fatigue, malaise, and headache. States he has a history of migraines, feels similar, but his migraine medication is not working. He denies cough, sore throat, nasal congestion, wheezing, abdominal pain, nausea, vomiting, or diarrhea. Denies h/o asthma/copd/CAP. No immunocompromise. Has tried OTC cold/flu medications without significant relief of symptoms. He does admit to stopping his citalopram abruptly 2 weeks ago but started taking half his prescribed dose again 5 days ago. States that he does have a history of depression and feeling more depressed lately. No recent ABX use. No other aggravating or alleviating factors.       Patient Active Problem List    Diagnosis Date Noted   • Rash and nonspecific skin eruption 11/22/2017   • Chronic migraine 05/11/2017   • BMI 31.0-31.9,adult 03/07/2017   • Rosacea 01/26/2016   • Type 2 diabetes mellitus without complication, without long-term current use of insulin (CMS-[facility]) 01/26/2016   • Frequent headaches 01/26/2016   • Depression 11/22/2010   • Hypogonadism male 11/22/2010   • ED (erectile dysfunction) 11/22/2010   • FRANK on CPAP 11/22/2010   • HTN (hypertension) 11/22/2010   • Acid reflux 11/22/2010       Allergies:Patient has no known allergies.    Current Outpatient Prescriptions Ordered in [facility]   Medication Sig Dispense Refill   • Cyanocobalamin (VITAMIN B 12 PO) Take  by mouth.     • Ascorbic Acid (VITAMIN C PO) Take  by mouth.     • Magnesium Chloride (MAGNESIUM DR PO) Take  by mouth.     • Potassium (POTASSIMIN PO) Take  by mouth.     • ascorbic acid (ASCORBIC ACID) 500 MG Tab Take 500 mg by mouth every day.     • Aspirin-Acetaminophen-Caffeine (EXCEDRIN MIGRAINE PO) Take  by  mouth.     • promethazine (PHENERGAN) 25 MG Tab 25 mg phenergan once on day with severe migraine.  Will cause sleepiness.  Do not drive. 30 Tab 11   • metformin (GLUCOPHAGE) 500 MG Tab Take 1 Tab by mouth 2 times a day, with meals. 180 Tab 3   • omeprazole (PRILOSEC) 20 MG delayed-release capsule Take 1 Cap by mouth every day. 90 Cap 3   • losartan (COZAAR) 100 MG Tab Take 1 Tab by mouth every day. 90 Tab 3   • citalopram (CELEXA) 20 MG Tab Take 0.5 Tabs by mouth every day. 45 Tab 3   • sumatriptan (IMITREX) 100 MG tablet 1/2 to one tab at onset of headache.  Can repeat once in 24H if headache persists two hours after first dose. 27 Tab 3   • Memantine HCl (NAMENDA TITRATION BELL) 5 (28)-10 (21) MG Tab Take 1 Tab by mouth See Admin Instructions. 49 Tab 0   • memantine (NAMENDA) 10 MG Tab Take 1 Tab by mouth 2 times a day. 60 Tab 11   • zolmitriptan (ZOMIG) 5 MG nasal solution Spray 1 Spray in nose Once PRN for Headache for up to 1 dose. 10 Each 11     No current [facility]-ordered facility-administered medications on file.        Past Medical History:   Diagnosis Date   • Bowel habit changes    • Diabetes (CMS-HCC)     orals meds   • Frequent headaches 2016   • Heart burn    • Hiatus hernia syndrome    • Hyperglycemia 2016   • Hypertension    • Indigestion    • Psychiatric problem     states depression/anxiety   • Rosacea 2016   • Sleep apnea     cpap   • Snoring        Social History   Substance Use Topics   • Smoking status: Former Smoker     Years: 5.00     Types: Cigarettes     Quit date: 1982   • Smokeless tobacco: Never Used      Comment: quit 35 years ago   • Alcohol use No       Family Status   Relation Status   • Mother Alive   • Sister Alive   • Father  at age 70    Diabetes   • Neg Hx      Family History   Problem Relation Age of Onset   • Diabetes Father    • Heart Attack Father 60   • Cancer Neg Hx    • Suicide Attempts Neg Hx        ROS:  Review of Systems   Constitutional: Positive  "for subjective fever, chills, fatigue, malaise. Negative for weight loss.  HENT:Negative for congestion, sore throat, ear pain, nosebleeds, and neck pain.    Eyes: Negative for vision changes.   Neuro: Positive for headache. Negative for sensory changes, dizziness, or syncope  Cardiovascular: Negative for chest pain, palpitations, orthopnea and leg swelling.   Respiratory: Positive for shortness of breath. Negative for cough, wheezing.   Gastrointestinal: Negative for abdominal pain, nausea, vomiting or diarrhea.   Skin: Negative for rash, diaphoresis.   Psych: Positive for depression.    Exam:  Blood pressure 120/78, pulse (!) 109, temperature 37.4 °C (99.4 °F), resp. rate 14, height 1.778 m (5' 10\"), weight 97.5 kg (215 lb), SpO2 93 %.  General: well-nourished, well-developed male, appears uncomfortable, non-toxic in appearance.   Head: normocephalic, atraumatic.  Eyes: PERRLA, EOM within normal limits, no conjunctival injection, no scleral icterus, visual fields and acuity grossly intact.  Ears: normal shape and symmetry, no tenderness, no discharge. External canals are without any significant edema or erythema. Tympanic membranes are without any inflammation, no effusion. Gross auditory acuity is intact.  Nose: symmetrical without tenderness, mild discharge, erythema present bilateral nares.  Mouth/Throat: reasonable hygiene, no exudates or tonsillar enlargement. Erythema present.   Neck: no masses, range of motion within normal limits, no tracheal deviation.  Lymph: mild cervical adenopathy. No supraclavicular adenopathy.   Neuro: alert and oriented. Cranial nerves 1-12 grossly intact.   Cardiovascular: Tachycardic rate and regular rhythm without murmurs, rubs, or gallops. No edema.   Pulmonary: no distress. Chest is symmetrical with respiration, no wheezes, crackles, or rhonchi.   Musculoskeletal: appropriate muscle tone, gait is stable.  Skin: warm, dry, intact, no clubbing, no cyanosis.   Psych: Flat affect, " "calm        Assessment/Plan:  1. Fever in adult  DX-CHEST-2 VIEWS   2. Shortness of breath  DX-CHEST-2 VIEWS    POCT Influenza A/B   3. Acute nonintractable headache, unspecified headache type     4. Suicidal ideation         POC flu negative      Dx chest reviewed by myself, radiology reading \"Unremarkable two view chest.\"          The patient's chest x-ray is negative as well as POC influenza. The patient is reevaluated and results are discussed. At this time, I cannot find a clear etiology for his complaints. He does admit to recent abrupt citalopram discontinuation which may cause several of the symptoms but I am also concerned about potential infectious process with his complaints of fever and headache. Therefore, I feel the patient requires a higher level of care including closer monitoring, stat lab work and/or imaging for further evaluation. This has been discussed with the patient and he states agreement and understanding. As this plan and is being discussed, he admits to feeling more depressed lately with suicidal ideation. Denies any plan but admits to having recurrent thoughts of harming himself. DENISE is called immediately for transport to ED for legal hold and further evaluation. This is discussed with the patient and he states agreement. I then proceeded to walk out of the room to reassess ETA for [facility]. At that point the patient decided to leave his exam patient room and walked out the building. I attempted to follow patient and encourage the patient to stay for further evaluation. He declined at that time, departed calmly. 911 was immediately called for assistance and states they will attempt to locate patient. I called the patient's contact on file, his brother, and expressed my concern. States that he has not yet heard from his brother but will contact clinic if he develops any knowledge of his whereabouts. Approximately 20 minutes after the patient left the building, I called [facility] " emergency department and spoke with the charge nurse, the charge nurse reports that the patient brought himself to the emergency department and is currently registered as a patient in the ED. At that point I spoke with Dr. Mcneil, ER physician, giving him full report regarding the physical and mental concerns I have with the patient, he states agreement and understanding and will further evaluate and treat the patient.         VIELKA Layne.

## 2018-01-03 ENCOUNTER — TELEPHONE (OUTPATIENT)
Dept: MEDICAL GROUP | Facility: PHYSICIAN GROUP | Age: 58
End: 2018-01-03

## 2018-01-03 ENCOUNTER — HOSPITAL ENCOUNTER (EMERGENCY)
Facility: MEDICAL CENTER | Age: 58
End: 2018-01-03
Payer: COMMERCIAL

## 2018-01-03 ENCOUNTER — APPOINTMENT (OUTPATIENT)
Dept: RADIOLOGY | Facility: MEDICAL CENTER | Age: 58
End: 2018-01-03
Payer: COMMERCIAL

## 2018-01-03 VITALS
HEART RATE: 93 BPM | TEMPERATURE: 97.5 F | BODY MASS INDEX: 30.68 KG/M2 | WEIGHT: 214.29 LBS | RESPIRATION RATE: 16 BRPM | SYSTOLIC BLOOD PRESSURE: 92 MMHG | HEIGHT: 70 IN | OXYGEN SATURATION: 96 % | DIASTOLIC BLOOD PRESSURE: 63 MMHG

## 2018-01-03 PROCEDURE — 302449 STATCHG TRIAGE ONLY (STATISTIC)

## 2018-01-03 NOTE — ED NOTES
".  Chief Complaint   Patient presents with   • Flu Like Symptoms     Body aches and chills, headaches x 2 weeks   • Nausea     Denies vomiting     .BP (!) 92/63   Pulse 93   Temp 36.4 °C (97.5 °F)   Resp 16   Ht 1.778 m (5' 10\")   Wt 97.2 kg (214 lb 4.6 oz)   SpO2 96%   BMI 30.75 kg/m²     Ambulatory to triage with above complaint, sepsis score = 3, sepsis protocol initiated, educated on triage process, placed in lobby, told to inform staff of any changes in condition.   "

## 2018-01-18 ENCOUNTER — OFFICE VISIT (OUTPATIENT)
Dept: MEDICAL GROUP | Facility: PHYSICIAN GROUP | Age: 58
End: 2018-01-18
Payer: COMMERCIAL

## 2018-01-18 VITALS
DIASTOLIC BLOOD PRESSURE: 70 MMHG | WEIGHT: 212 LBS | HEIGHT: 70 IN | SYSTOLIC BLOOD PRESSURE: 120 MMHG | HEART RATE: 103 BPM | TEMPERATURE: 98.4 F | OXYGEN SATURATION: 92 % | BODY MASS INDEX: 30.35 KG/M2

## 2018-01-18 DIAGNOSIS — Z23 NEED FOR VACCINATION: ICD-10-CM

## 2018-01-18 DIAGNOSIS — M47.812 SPONDYLOSIS OF CERVICAL REGION WITHOUT MYELOPATHY OR RADICULOPATHY: ICD-10-CM

## 2018-01-18 DIAGNOSIS — E11.9 TYPE 2 DIABETES MELLITUS WITHOUT COMPLICATION, WITHOUT LONG-TERM CURRENT USE OF INSULIN (HCC): ICD-10-CM

## 2018-01-18 PROCEDURE — 90471 IMMUNIZATION ADMIN: CPT | Performed by: NURSE PRACTITIONER

## 2018-01-18 PROCEDURE — 90732 PPSV23 VACC 2 YRS+ SUBQ/IM: CPT | Performed by: NURSE PRACTITIONER

## 2018-01-18 PROCEDURE — 90472 IMMUNIZATION ADMIN EACH ADD: CPT | Performed by: NURSE PRACTITIONER

## 2018-01-18 PROCEDURE — 99214 OFFICE O/P EST MOD 30 MIN: CPT | Mod: 25 | Performed by: NURSE PRACTITIONER

## 2018-01-18 PROCEDURE — 90746 HEPB VACCINE 3 DOSE ADULT IM: CPT | Performed by: NURSE PRACTITIONER

## 2018-01-18 ASSESSMENT — PAIN SCALES - GENERAL: PAINLEVEL: 3=SLIGHT PAIN

## 2018-01-23 NOTE — PROGRESS NOTES
Subjective:     Chief Complaint   Patient presents with   • Headache       HPI  Miguel Angel Fernández is a 57 y.o. male here today for headache and referral to dentist to rule out TMJ    Chronic migraine  Ongoing chronic problem currently following neurology managed with Botox injections and sumitriptan 50 mg BID.  Headache pain is in posterior neck, occipital region, and forehead.   He fell head first off 12 foot ladder and had LOC in the past. Since then he has had posterior neck and occipital pain. He believes this is causing his migraines.   He has had MRI of brain and MRA of head/neck that were normal.     Cervical spondylosis  Ongoing problem. There is pain in posterior neck. Every once in awhile neck feels stiff. There is no radiating arm pain, numbness, or weakness.  6/23/2015 8:59 AM    HISTORY/REASON FOR EXAM:  Neck pain.  .    TECHNIQUE/EXAM DESCRIPTION AND NUMBER OF VIEWS:  Cervical spine series, 3 views.    COMPARISON:  None.      FINDINGS:  There is minimal retrolisthesis at C3-4 and also at C5-6. There are no compression fractures.  There is disc space narrowing and osteophyte at C4-5 and C5-6. There is probably a small amount of osteophyte at C3-4.    There is mild to moderate arthrosis in the facets, most advanced at C7-T1.  No soft tissue abnormality is identified.   Impression       1.  Moderate multilevel cervical spondylosis.         Type 2 diabetes mellitus without complication, without long-term current use of insulin (CMS-[facility])  Controlled: yes  Medications: metformin 500 mg BID  Statin: no  ACEI/ARB: yes  Denies any polyuria, polydipsia, polyphagia, blurred or cloudy vision, rash or thrush, or numbness or tingling of feet. Last dilated retinal eye exam was March 2017    Ref. Range 11/23/2016 07:51 11/25/2017 07:40   Glycohemoglobin Latest Ref Range: 0.0 - 5.6 % 6.3 (H) 6.5 (H)   Estim. Avg Glu Latest Units: mg/dL 134 140        Diagnoses of Chronic migraine, Spondylosis of cervical region  without myelopathy or radiculopathy, Type 2 diabetes mellitus without complication, without long-term current use of insulin (CMS-HCC), and Need for vaccination were pertinent to this visit.    Allergies: Patient has no known allergies.  Current medicines (including changes today)  Current Outpatient Prescriptions   Medication Sig Dispense Refill   • sumatriptan (IMITREX) 100 MG tablet 1/2 to one tab at onset of headache.  Can repeat once in 24H if headache persists two hours after first dose. 27 Tab 3   • zolmitriptan (ZOMIG) 5 MG nasal solution Spray 1 Spray in nose Once PRN for Headache for up to 1 dose. 10 Each 11   • Cyanocobalamin (VITAMIN B 12 PO) Take  by mouth.     • Ascorbic Acid (VITAMIN C PO) Take  by mouth.     • Magnesium Chloride (MAGNESIUM DR PO) Take  by mouth.     • Potassium (POTASSIMIN PO) Take  by mouth.     • ascorbic acid (ASCORBIC ACID) 500 MG Tab Take 500 mg by mouth every day.     • Aspirin-Acetaminophen-Caffeine (EXCEDRIN MIGRAINE PO) Take  by mouth.     • promethazine (PHENERGAN) 25 MG Tab 25 mg phenergan once on day with severe migraine.  Will cause sleepiness.  Do not drive. 30 Tab 11   • metformin (GLUCOPHAGE) 500 MG Tab Take 1 Tab by mouth 2 times a day, with meals. 180 Tab 3   • omeprazole (PRILOSEC) 20 MG delayed-release capsule Take 1 Cap by mouth every day. 90 Cap 3   • losartan (COZAAR) 100 MG Tab Take 1 Tab by mouth every day. 90 Tab 3   • citalopram (CELEXA) 20 MG Tab Take 0.5 Tabs by mouth every day. 45 Tab 3   • Memantine HCl (NAMENDA TITRATION BELL) 5 (28)-10 (21) MG Tab Take 1 Tab by mouth See Admin Instructions. 49 Tab 0   • memantine (NAMENDA) 10 MG Tab Take 1 Tab by mouth 2 times a day. 60 Tab 11     No current facility-administered medications for this visit.        He  has a past medical history of Bowel habit changes; Diabetes (CMS-HCC); Frequent headaches (1/26/2016); Heart burn; Hiatus hernia syndrome; Hyperglycemia (1/26/2016); Hypertension; Indigestion; Psychiatric  "problem; Rosacea (1/26/2016); Sleep apnea; and Snoring.      ROS  As stated in HPI and additionally  Gen: No F/C  Neuro: +headaches. No dizziness, weakness, numbness, tingling  CV: No chest pain, syncope  Pulm: No sob or dyspnea     Objective:     Blood pressure 120/70, pulse (!) 103, temperature 36.9 °C (98.4 °F), height 1.778 m (5' 10\"), weight 96.2 kg (212 lb), SpO2 92 %. Body mass index is 30.42 kg/m².  Physical Exam:  General: Alert, oriented, in no acute distress.  Eye contact is good, speech goal directed, affect calm  CNs grossly intact.  HEENT: conjunctiva non-injected, sclera non-icteric, EOMs intact. No lid edema or eye drainage.   Gross hearing intact.  CV: regular rate and rhythm. No murmurs. No carotid bruits.   Ext: no edema, normal color and temperature.   Monofilament testing with a 10 gram force: sensation intact: intact bilaterally  Visual Inspection: Feet without maceration, ulcers, fissures.  Pedal pulses: intact bilaterally  Skin: No rashes or lesions in visible areas  Gait steady.     Assessment and Plan:   Assessment/Plan:  1. Chronic migraine  Chronic problem not controlled. Will refer to dentist to r/o TMJ as source of migraine. Enc him to f/u with neuro after physiatry and dentist appointment   - REFERRAL TO DENTISTRY    2. Spondylosis of cervical region without myelopathy or radiculopathy  Chronic problem with pain in neck and potential headaches secondary to this. Refer to physiatry for further evaluation with MRI with flexion/extension   - REFERRAL TO PHYSIATRY (PMR)    3. Type 2 diabetes mellitus without complication, without long-term current use of insulin (CMS-HCC)  Stable. Continue current meds. Check labs in 4 months   - HEMOGLOBIN A1C; Future  - COMP METABOLIC PANEL; Future  - MICROALBUMIN CREAT RATIO URINE; Future    4. Need for vaccination  I have placed the below orders and discussed them with an approved delegating provider. The MA is performing the below orders under the " direction of Dr. Machuca  - HEPATITIS B VACCINE ADULT IM  - PNEUMOCOCCAL POLYSACCHARIDE VACCINE 23-VALENT =>1YO SQ/IM       Follow up:  Return in about 4 months (around 5/18/2018).    Educated in proper administration of medication(s) ordered today including safety, possible SE, risks, benefits, rationale and alternatives to therapy.   Supportive care, differential diagnoses, and indications for immediate follow-up discussed with patient.    Pathogenesis of diagnosis discussed including typical length and natural progression.    Instructed to return to clinic or nearest emergency department for any change in condition, further concerns, or worsening of symptoms.  Patient states understanding of the plan of care and discharge instructions.      Please note that this dictation was created using voice recognition software. I have made every reasonable attempt to correct obvious errors, but I expect that there are errors of grammar and possibly content that I did not discover before finalizing the note.    Followup: Return in about 4 months (around 5/18/2018). sooner should new symptoms or problems arise.

## 2018-01-31 PROBLEM — K22.70 BARRETT'S ESOPHAGUS WITHOUT DYSPLASIA: Status: ACTIVE | Noted: 2018-01-31

## 2018-02-16 DIAGNOSIS — E11.9 TYPE 2 DIABETES MELLITUS WITHOUT COMPLICATION, WITHOUT LONG-TERM CURRENT USE OF INSULIN (HCC): ICD-10-CM

## 2018-02-28 ENCOUNTER — OFFICE VISIT (OUTPATIENT)
Dept: NEUROLOGY | Facility: MEDICAL CENTER | Age: 58
End: 2018-02-28
Payer: COMMERCIAL

## 2018-02-28 VITALS
BODY MASS INDEX: 30.9 KG/M2 | SYSTOLIC BLOOD PRESSURE: 124 MMHG | WEIGHT: 215.83 LBS | TEMPERATURE: 98.3 F | HEART RATE: 81 BPM | OXYGEN SATURATION: 95 % | DIASTOLIC BLOOD PRESSURE: 82 MMHG | HEIGHT: 70 IN

## 2018-02-28 PROCEDURE — 64615 CHEMODENERV MUSC MIGRAINE: CPT | Performed by: PHYSICIAN ASSISTANT

## 2018-03-01 NOTE — PROGRESS NOTES
I treated this patient in clinic today with BotoxA 195 units according to the dosing/injection paradigm currently mandated by the FDA for the management of chronic migraine. Specifically, I injected 5 units to the procerus, 5 units to the corrugators bilaterally, a total of 30 units to the frontalis musculature, 20 units to the temporalis bilaterally, 15 units to the occipitalis bilaterally, 10 units to the cervical paraspinals bilaterally and 15 units to the trapezius musculature bilaterally.    Pt was advised of side effects associated with medication.    I also injected an additional 10 units into the occipital belly of the occipitofrontalis each side because pt complained of reduced efficacy or effect wearing off prior to next dose.      The remainder of the Botox 200 units mixed but not administered was discarded as wastage per FDA guidelines.

## 2018-03-07 ENCOUNTER — OFFICE VISIT (OUTPATIENT)
Dept: URGENT CARE | Facility: PHYSICIAN GROUP | Age: 58
End: 2018-03-07
Payer: COMMERCIAL

## 2018-03-07 VITALS
HEART RATE: 86 BPM | DIASTOLIC BLOOD PRESSURE: 82 MMHG | HEIGHT: 70 IN | TEMPERATURE: 98.1 F | RESPIRATION RATE: 14 BRPM | SYSTOLIC BLOOD PRESSURE: 124 MMHG | BODY MASS INDEX: 30.06 KG/M2 | OXYGEN SATURATION: 95 % | WEIGHT: 210 LBS

## 2018-03-07 DIAGNOSIS — N30.00 ACUTE CYSTITIS WITHOUT HEMATURIA: ICD-10-CM

## 2018-03-07 PROCEDURE — 99214 OFFICE O/P EST MOD 30 MIN: CPT | Performed by: FAMILY MEDICINE

## 2018-03-07 RX ORDER — CIPROFLOXACIN 500 MG/1
500 TABLET, FILM COATED ORAL 2 TIMES DAILY
Qty: 20 TAB | Refills: 0 | Status: SHIPPED | OUTPATIENT
Start: 2018-03-07 | End: 2018-03-17

## 2018-03-07 ASSESSMENT — PAIN SCALES - GENERAL: PAINLEVEL: 3=SLIGHT PAIN

## 2018-03-07 NOTE — PROGRESS NOTES
Subjective:      CC:  presents with Dysuria            Dysuria   This is a new problem. The current episode started last night. The problem occurs every urination. The problem has been unchanged. The quality of the pain is described as burning. There has been no fever. Pt is not sexually active. There is no history of pyelonephritis. Associated symptoms include frequency and urgency. Pertinent negatives include no chills, discharge, hematuria, flank pain, nausea or vomiting. Pt has tried nothing for the symptoms. There is no history of recurrent UTIs.     Social History     Social History   • Marital status: Single     Spouse name: N/A   • Number of children: N/A   • Years of education: N/A     Occupational History   • Not on file.     Social History Main Topics   • Smoking status: Former Smoker     Years: 5.00     Types: Cigarettes     Quit date: 1/1/1982   • Smokeless tobacco: Never Used      Comment: quit 35 years ago   • Alcohol use No   • Drug use: No      Comment: marijuana in past   • Sexual activity: Not Currently     Other Topics Concern   • Not on file     Social History Narrative    Patient is  with an adult son. He works full time as a          Family History   Problem Relation Age of Onset   • Diabetes Father    • Heart Attack Father 60   • Cancer Neg Hx    • Suicide Attempts Neg Hx          No Known Allergies        Current Outpatient Prescriptions on File Prior to Visit   Medication Sig Dispense Refill   • metFORMIN (GLUCOPHAGE) 500 MG Tab Take 1 Tab by mouth 2 times a day, with meals. 180 Tab 3   • sumatriptan (IMITREX) 100 MG tablet 1/2 to one tab at onset of headache.  Can repeat once in 24H if headache persists two hours after first dose. 27 Tab 3   • Magnesium Chloride (MAGNESIUM DR PO) Take  by mouth.     • omeprazole (PRILOSEC) 20 MG delayed-release capsule Take 1 Cap by mouth every day. 90 Cap 3   • losartan (COZAAR) 100 MG Tab Take 1 Tab by mouth every day. 90 Tab 3  "  • citalopram (CELEXA) 20 MG Tab Take 0.5 Tabs by mouth every day. 45 Tab 3   • Memantine HCl (NAMENDA TITRATION BELL) 5 (28)-10 (21) MG Tab Take 1 Tab by mouth See Admin Instructions. (Patient not taking: Reported on 2/28/2018) 49 Tab 0   • memantine (NAMENDA) 10 MG Tab Take 1 Tab by mouth 2 times a day. (Patient not taking: Reported on 2/28/2018) 60 Tab 11   • zolmitriptan (ZOMIG) 5 MG nasal solution Spray 1 Spray in nose Once PRN for Headache for up to 1 dose. (Patient not taking: Reported on 2/28/2018) 10 Each 11   • Cyanocobalamin (VITAMIN B 12 PO) Take  by mouth.     • Ascorbic Acid (VITAMIN C PO) Take  by mouth.     • Potassium (POTASSIMIN PO) Take  by mouth.     • ascorbic acid (ASCORBIC ACID) 500 MG Tab Take 500 mg by mouth every day.     • Aspirin-Acetaminophen-Caffeine (EXCEDRIN MIGRAINE PO) Take  by mouth.     • promethazine (PHENERGAN) 25 MG Tab 25 mg phenergan once on day with severe migraine.  Will cause sleepiness.  Do not drive. 30 Tab 11     No current facility-administered medications on file prior to visit.        Review of Systems   Constitutional: Negative for chills.   Respiratory: Negative for shortness of breath.    Cardiovascular: Negative for chest pain.   Gastrointestinal: Negative for nausea, vomiting and abdominal pain.   Genitourinary: Positive for dysuria, urgency and frequency. Negative for flank pain.   Skin: Negative for rash.   Neurological: Negative for dizziness and headaches.   All other systems reviewed and are negative.         Objective:      Blood pressure 124/82, pulse 86, temperature 36.7 °C (98.1 °F), resp. rate 14, height 1.778 m (5' 10\"), weight 95.3 kg (210 lb), SpO2 95 %.      Physical Exam   Constitutional: pt is oriented to person, place, and time. Pt appears well-developed and well-nourished. No distress.   HENT:   Head: Normocephalic and atraumatic.   Mouth/Throat: Mucous membranes are normal.   Eyes: Conjunctivae and EOM are normal. Pupils are equal, round, and " reactive to light. Right eye exhibits no discharge. Left eye exhibits no discharge. No scleral icterus.   Neck: Normal range of motion. Neck supple.   Cardiovascular: Normal rate, regular rhythm, normal heart sounds and intact distal pulses.    No murmur heard.  Pulmonary/Chest: Effort normal and breath sounds normal. No respiratory distress. Pt has no wheezes,  rales.   Abdominal: Bowel sounds are normal. Pt exhibits no distension and no mass. There is no tenderness. There is no rebound, no guarding and no CVA tenderness.   Neurological: pt is alert and oriented to person, place, and time.   Skin: Skin is warm and dry.   Psychiatric: behavior is normal.           Assessment/Plan:     1. Acute cystitis without hematuria   UA c/w UTI - positive for LE, nitrates    - REFERRAL TO UROLOGY  - ciprofloxacin (CIPRO) 500 MG Tab; Take 1 Tab by mouth 2 times a day for 10 days.  Dispense: 20 Tab; Refill: 0      Follow up in one week if no improvement, sooner if symptoms worsen.

## 2018-03-21 ENCOUNTER — SLEEP CENTER VISIT (OUTPATIENT)
Dept: SLEEP MEDICINE | Facility: MEDICAL CENTER | Age: 58
End: 2018-03-21
Payer: COMMERCIAL

## 2018-03-21 VITALS
HEART RATE: 87 BPM | DIASTOLIC BLOOD PRESSURE: 80 MMHG | WEIGHT: 217 LBS | SYSTOLIC BLOOD PRESSURE: 120 MMHG | HEIGHT: 70 IN | OXYGEN SATURATION: 93 % | BODY MASS INDEX: 31.07 KG/M2 | RESPIRATION RATE: 16 BRPM

## 2018-03-21 DIAGNOSIS — I10 ESSENTIAL HYPERTENSION: ICD-10-CM

## 2018-03-21 DIAGNOSIS — G47.33 OSA ON CPAP: ICD-10-CM

## 2018-03-21 DIAGNOSIS — Z87.891 HISTORY OF TOBACCO ABUSE: ICD-10-CM

## 2018-03-21 DIAGNOSIS — E11.9 TYPE 2 DIABETES MELLITUS WITHOUT COMPLICATION, WITHOUT LONG-TERM CURRENT USE OF INSULIN (HCC): ICD-10-CM

## 2018-03-21 PROCEDURE — 99244 OFF/OP CNSLTJ NEW/EST MOD 40: CPT | Performed by: INTERNAL MEDICINE

## 2018-03-21 NOTE — PROGRESS NOTES
CC: Establish care for sleep apnea    HPI:    Mr. Miguel Angel Fernández is a 58-year-old male  kindly referred by Osiel WALSH to establish care for sleep apnea. He has previously seen both  and Dr. Cai, both of whom have left practice. Originally diagnosed in about 2009. Has had 2 machines, the 2cnd is more than 6 years old.    Usage has been 96.7% of the last 30 days for 8:22 with a residual ahi of 0.5 on CPAP [id number].    He is currently doing very well using CPAP. He feels that he is achieving significant benefit and is essentially asymptomatic on therapy specifically he denies problems with excessive sleepiness during the day, or too little sleep at night. He has nocturnal awakenings as well as some nocturia. He used to have significant problems with sleepiness fatigue and snoring prior to using CPAP. He does experience some nocturnal leg cramps. Uses magnesium. He also describes teeth grinding and has headaches most nights. He does not fall asleep accidentally.    He lives alone and does not have a regular bed partner. His ESS is 4.    Quit smoking in 1981 after smoking for about 5 years. He seldom uses alcohol.        Patient Active Problem List    Diagnosis Date Noted   • History of tobacco abuse 03/21/2018   • Rodriguez's esophagus without dysplasia 01/31/2018   • Cervical spondylosis 01/18/2018   • Rash and nonspecific skin eruption 11/22/2017   • Chronic migraine 05/11/2017   • BMI 31.0-31.9,adult 03/07/2017   • Rosacea 01/26/2016   • Type 2 diabetes mellitus without complication, without long-term current use of insulin (CMS-[facility]) 01/26/2016   • Frequent headaches 01/26/2016   • Depression 11/22/2010   • Hypogonadism male 11/22/2010   • ED (erectile dysfunction) 11/22/2010   • FRANK on CPAP 11/22/2010   • HTN (hypertension) 11/22/2010   • Acid reflux 11/22/2010       Past Medical History:   Diagnosis Date   • Bowel habit changes    • Chickenpox    • Depression    • Diabetes  ([facility]     orals meds   • Frequent headaches 1/26/2016   • GERD (gastroesophageal reflux disease)    • Vietnamese measles    • Heart burn    • Hiatus hernia syndrome    • Hyperglycemia 1/26/2016   • Hypertension    • Indigestion    • Kidney stone    • Psychiatric problem     states depression/anxiety   • Rosacea 1/26/2016   • Sleep apnea     cpap   • Snoring         Past Surgical History:   Procedure Laterality Date   • HIP ARTHROPLASTY TOTAL Left 12/30/2016    Procedure: HIP ARTHROPLASTY TOTAL;  Surgeon: Dc Sanchez M.D.;  Location: SURGERY [facility];  Service:    • VENTRAL HERNIA REPAIR  9/28/2016    Procedure: VENTRAL HERNIA REPAIR W/MESH;  Surgeon: Corona Delvalle M.D.;  Location: SURGERY [facility];  Service:    • SEPTOTURBINOPLASTY  8/17/2010    Performed by VERONICA FRIEDMAN at SURGERY SAME DAY [facility] ORS   • OTHER  4/2010    kidney stone   • MENISCECTOMY Right 2010   • ELBOW ORIF Left 10/2004   • ACL REPAIR Left 8/2002    lt   • ARTHROSCOPY, KNEE     • HIP REPLACEMENT, TOTAL     • OTHER      lasik rt eye   • OTHER ORTHOPEDIC SURGERY      broken rt arm   • SINUSCOPE         Family History   Problem Relation Age of Onset   • Diabetes Father    • Heart Attack Father 60   • Cancer Neg Hx    • Suicide Attempts Neg Hx        Social History     Social History   • Marital status: Single     Spouse name: N/A   • Number of children: N/A   • Years of education: N/A     Occupational History   • Not on file.     Social History Main Topics   • Smoking status: Former Smoker     Years: 5.00     Types: Cigarettes     Quit date: 1/1/1982   • Smokeless tobacco: Never Used      Comment: quit 35 years ago   • Alcohol use No   • Drug use: No      Comment: marijuana in past   • Sexual activity: Not Currently     Other Topics Concern   • Not on file     Social History Narrative    Patient is  with an adult son. He works full time as a        Current Outpatient Prescriptions   Medication Sig  "Dispense Refill   • metFORMIN (GLUCOPHAGE) 500 MG Tab Take 1 Tab by mouth 2 times a day, with meals. 180 Tab 3   • sumatriptan (IMITREX) 100 MG tablet 1/2 to one tab at onset of headache.  Can repeat once in 24H if headache persists two hours after first dose. (Patient taking differently: 50 mg. 1/2 to one tab at onset of headache.  Can repeat once in 24H if headache persists two hours after first dose.) 27 Tab 3   • Magnesium Chloride (MAGNESIUM DR PO) Take  by mouth.     • Aspirin-Acetaminophen-Caffeine (EXCEDRIN MIGRAINE PO) Take  by mouth.     • omeprazole (PRILOSEC) 20 MG delayed-release capsule Take 1 Cap by mouth every day. 90 Cap 3   • losartan (COZAAR) 100 MG Tab Take 1 Tab by mouth every day. 90 Tab 3   • citalopram (CELEXA) 20 MG Tab Take 0.5 Tabs by mouth every day. 45 Tab 3   • Memantine HCl (NAMENDA TITRATION BELL) 5 (28)-10 (21) MG Tab Take 1 Tab by mouth See Admin Instructions. (Patient not taking: Reported on 2/28/2018) 49 Tab 0   • memantine (NAMENDA) 10 MG Tab Take 1 Tab by mouth 2 times a day. (Patient not taking: Reported on 2/28/2018) 60 Tab 11   • zolmitriptan (ZOMIG) 5 MG nasal solution Spray 1 Spray in nose Once PRN for Headache for up to 1 dose. (Patient not taking: Reported on 2/28/2018) 10 Each 11   • Cyanocobalamin (VITAMIN B 12 PO) Take  by mouth.     • Ascorbic Acid (VITAMIN C PO) Take  by mouth.     • Potassium (POTASSIMIN PO) Take  by mouth.     • ascorbic acid (ASCORBIC ACID) 500 MG Tab Take 500 mg by mouth every day.     • promethazine (PHENERGAN) 25 MG Tab 25 mg phenergan once on day with severe migraine.  Will cause sleepiness.  Do not drive. 30 Tab 11     No current facility-administered medications for this visit.     \"CURRENT RX\"    ALLERGIES: Patient has no known allergies.    ROS  Constitutional: Around [place] he had fever and chills. He has night sweats nightly but denies fatigue or weight loss  Eyes: Denies vision loss, pain, drainage, double vision, " "glasses.  Ears/Nose/Mouth/Throat: Denies earache, difficulty hearing, rhinitis/nasal congestion, injury, recurrent sore throat, persistent hoarseness, decayed teeth/toothaches, ringing or buzzing in the ears.  Cardiovascular: Denies chest pain, tightness, palpitations, swelling in legs/feet, fainting, difficulty breathing when lying down but gets better when sitting up.   Respiratory: Denies shortness of breath, cough, sputum, wheezing, painful breathing, coughing up blood.   Sleep: per HPI  Gastrointestinal: Denies  difficulty swallowing, nausea, abdominal pain, diarrhea, constipation. Complains of heartburn  Genitourinary: Denies  blood in urine, discharge, frequent urination.   Musculoskeletal: Denies painful joints, sore muscles. Complains of back pain  Integumentary: Denies rashes, lumps, color changes.   Neurological: Denies,weakness, dizziness. Complains of frequent headaches almost nightly     PHYSICAL EXAM    /80   Pulse 87   Resp 16   Ht 1.778 m (5' 10\")   Wt 98.4 kg (217 lb)   SpO2 93%   BMI 31.14 kg/m²   Appearance: Well-nourished, well-developed, no acute distress  Eyes:  PERRLA, EOMI  Hearing:  Grossly intact  Nose:  Normal, no lesions or deformities, turbinates moist  Oropharynx:  Tongue normal, posterior pharynx without erythema or exudate  Mallampati classification:  4  Neck: Supple, trachea midline, no masses  Respiratory effort:  No intercostal retractions or use of accessory muscles  Lung auscultation:  No wheezes rhonchi rubs or rales  Cardiac auscultation:  No murmurs, rubs, or gallops, no regular rhythm, normal rate  Abdomen:  No tenderness, no organomegaly; obese  Extremities:  No cyanosis, clubbing, edema  Gait and Station:  Normal  Digits and nails: No clubbing, cyanosis, petechiae, or nodes  Musculoskeletal:  Grossly normal  Skin:  No rashes  Orientation:  Oriented time, place, and person  Mood and affect:  No depression, anxiety, agitation  Judgment:  Intact    PROBLEMS:    1. " FRANK on CPAP    - DME MASK AND SUPPLIES    2. Chronic migraine      3. BMI 31.0-31.9,adult    - OBESITY COUNSELING (No Charge): Patient identified as having weight management issue.  Appropriate orders and counseling given.    4. Essential hypertension      5. Type 2 diabetes mellitus without complication, without long-term current use of insulin (CMS-[facility])      6. History of tobacco abuse            PLAN:   Has been advised to continue the current cpap 11 cmH2O therapy, clean equipment frequently, and get new mask and supplies as allowed by insurance and DME. Advised about potential problems including nasal obstruction/stuffiness, mask leak or discomfort , frequent awakenings, chill or dryness of the upper airway, noise, inconvenience, and lack of benefit. Recommend an earlier appointment, if significant treatment barriers develop.    Return in about 6 months (around 9/21/2018).     He likely qualifies for a new cpap machine but his is working well so he wants to wait. Told to call for new machine order if his malfunctions before his next OV.

## 2018-04-17 DIAGNOSIS — F33.41 RECURRENT MAJOR DEPRESSIVE DISORDER, IN PARTIAL REMISSION (HCC): ICD-10-CM

## 2018-04-17 DIAGNOSIS — K21.9 GASTROESOPHAGEAL REFLUX DISEASE, ESOPHAGITIS PRESENCE NOT SPECIFIED: ICD-10-CM

## 2018-04-17 DIAGNOSIS — I10 ESSENTIAL HYPERTENSION: ICD-10-CM

## 2018-04-17 RX ORDER — OMEPRAZOLE 20 MG/1
20 CAPSULE, DELAYED RELEASE ORAL DAILY
Qty: 90 CAP | Refills: 0 | Status: SHIPPED | OUTPATIENT
Start: 2018-04-17 | End: 2018-07-08 | Stop reason: SDUPTHER

## 2018-04-17 RX ORDER — CITALOPRAM 20 MG/1
10 TABLET ORAL DAILY
Qty: 45 TAB | Refills: 0 | Status: SHIPPED | OUTPATIENT
Start: 2018-04-17 | End: 2018-05-09

## 2018-04-17 RX ORDER — LOSARTAN POTASSIUM 100 MG/1
100 TABLET ORAL DAILY
Qty: 90 TAB | Refills: 0 | Status: SHIPPED | OUTPATIENT
Start: 2018-04-17 | End: 2018-09-11 | Stop reason: SDUPTHER

## 2018-05-09 ENCOUNTER — TELEPHONE (OUTPATIENT)
Dept: MEDICAL GROUP | Facility: PHYSICIAN GROUP | Age: 58
End: 2018-05-09

## 2018-05-09 ENCOUNTER — OFFICE VISIT (OUTPATIENT)
Dept: MEDICAL GROUP | Facility: PHYSICIAN GROUP | Age: 58
End: 2018-05-09
Payer: COMMERCIAL

## 2018-05-09 ENCOUNTER — PATIENT MESSAGE (OUTPATIENT)
Dept: MEDICAL GROUP | Facility: PHYSICIAN GROUP | Age: 58
End: 2018-05-09

## 2018-05-09 VITALS
OXYGEN SATURATION: 94 % | DIASTOLIC BLOOD PRESSURE: 82 MMHG | TEMPERATURE: 97.8 F | HEART RATE: 70 BPM | SYSTOLIC BLOOD PRESSURE: 124 MMHG | BODY MASS INDEX: 30.92 KG/M2 | HEIGHT: 70 IN | WEIGHT: 216 LBS

## 2018-05-09 DIAGNOSIS — Z23 NEED FOR VACCINATION: ICD-10-CM

## 2018-05-09 DIAGNOSIS — G44.40 ANALGESIC REBOUND HEADACHE: ICD-10-CM

## 2018-05-09 DIAGNOSIS — T39.95XA ANALGESIC REBOUND HEADACHE: ICD-10-CM

## 2018-05-09 DIAGNOSIS — E11.9 TYPE 2 DIABETES MELLITUS WITHOUT COMPLICATION, WITHOUT LONG-TERM CURRENT USE OF INSULIN (HCC): ICD-10-CM

## 2018-05-09 DIAGNOSIS — N52.1 ERECTILE DYSFUNCTION DUE TO DISEASES CLASSIFIED ELSEWHERE: ICD-10-CM

## 2018-05-09 DIAGNOSIS — F33.42 RECURRENT MAJOR DEPRESSIVE DISORDER, IN FULL REMISSION (HCC): ICD-10-CM

## 2018-05-09 PROBLEM — R21 RASH AND NONSPECIFIC SKIN ERUPTION: Status: RESOLVED | Noted: 2017-11-22 | Resolved: 2018-05-09

## 2018-05-09 LAB
HBA1C MFR BLD: 6.3 % (ref ?–5.8)
INT CON NEG: NEGATIVE
INT CON POS: POSITIVE

## 2018-05-09 PROCEDURE — 83036 HEMOGLOBIN GLYCOSYLATED A1C: CPT | Performed by: NURSE PRACTITIONER

## 2018-05-09 PROCEDURE — 90746 HEPB VACCINE 3 DOSE ADULT IM: CPT | Performed by: NURSE PRACTITIONER

## 2018-05-09 PROCEDURE — 90471 IMMUNIZATION ADMIN: CPT | Performed by: NURSE PRACTITIONER

## 2018-05-09 PROCEDURE — 99214 OFFICE O/P EST MOD 30 MIN: CPT | Mod: 25 | Performed by: NURSE PRACTITIONER

## 2018-05-09 RX ORDER — TIZANIDINE 2 MG/1
2-4 TABLET ORAL EVERY 6 HOURS PRN
Qty: 240 TAB | Refills: 0 | Status: SHIPPED | OUTPATIENT
Start: 2018-05-09 | End: 2018-10-08

## 2018-05-09 RX ORDER — HYDROXYZINE HYDROCHLORIDE 25 MG/1
25 TABLET, FILM COATED ORAL 3 TIMES DAILY PRN
Qty: 30 TAB | Refills: 0 | Status: SHIPPED | OUTPATIENT
Start: 2018-05-09 | End: 2018-10-25

## 2018-05-09 RX ORDER — NAPROXEN 500 MG/1
500 TABLET ORAL 2 TIMES DAILY WITH MEALS
Qty: 42 TAB | Refills: 0 | Status: SHIPPED | OUTPATIENT
Start: 2018-05-09 | End: 2018-10-08

## 2018-05-09 RX ORDER — SILDENAFIL 50 MG/1
50 TABLET, FILM COATED ORAL PRN
Qty: 10 TAB | Refills: 3 | Status: SHIPPED | OUTPATIENT
Start: 2018-05-09 | End: 2019-01-10 | Stop reason: SDUPTHER

## 2018-05-09 RX ORDER — CITALOPRAM HYDROBROMIDE 10 MG/1
TABLET ORAL
Qty: 30 TAB | Refills: 0 | Status: SHIPPED | OUTPATIENT
Start: 2018-05-09 | End: 2018-08-15

## 2018-05-09 RX ORDER — PREDNISONE 20 MG/1
40 TABLET ORAL DAILY
Qty: 10 TAB | Refills: 0 | Status: SHIPPED | OUTPATIENT
Start: 2018-05-09 | End: 2018-10-08

## 2018-05-09 NOTE — ASSESSMENT & PLAN NOTE
This has been ongoing for many years, but is worsening. He is on citalopram daily, which could contribute to this. HTN and diabetes are well controlled. PSA and TSH checked Nov 2017 and these were normal.

## 2018-05-09 NOTE — ASSESSMENT & PLAN NOTE
Chronic problem worse this year. Following neurology. Has received Botox without improvement last 2/28/18   He is using sumatriptan 2-3 tabs daily. About every 8 hours, he will develop severe headache.   States ever since he started taking sumatriptan, Excedrin no longer helps.   He has been referred to a dentist for TMJ referral. May 15th he has f/u after he had a CT scan.

## 2018-05-09 NOTE — ASSESSMENT & PLAN NOTE
Chronic problem since 2004 after a fall off of a ladder and had head injury leading him into a state of depression. Symptoms have been well controlled with citalopram 10 mg daily. He tried to go without this in December 2017, but he became very emotional and restarted it.

## 2018-05-09 NOTE — PROGRESS NOTES
Subjective:     Chief Complaint   Patient presents with   • Headache     questions about meds       HPI  Miguel Angel Fernández is a 58 y.o. male here today for questions about headache medication.     Frequent headaches  Chronic problem worse this year. Following neurology. Has received Botox without improvement last 2/28/18   He is using sumatriptan 2-3 tabs daily. About every 8 hours, he will develop severe headache.   States ever since he started taking sumatriptan, Excedrin no longer helps.   He has been referred to a dentist for TMJ referral. May 15th he has f/u after he had a CT scan.       DEPRESSION  Chronic problem since 2004 after a fall off of a ladder and had head injury leading him into a state of depression. Symptoms have been well controlled with citalopram 10 mg daily. He tried to go without this in December 2017, but he became very emotional and restarted it.     ED (erectile dysfunction)  This has been ongoing for many years, but is worsening. He is on citalopram daily, which could contribute to this. HTN and diabetes are well controlled. PSA and TSH checked Nov 2017 and these were normal.       Type 2 diabetes mellitus without complication, without long-term current use of insulin (CMS-[facility])  Chronic problem well controlled with metformin. Denies polyuria or polydipsia. A1c today 6.3.       Diagnoses of Analgesic rebound headache, Recurrent major depressive disorder, in full remission ([facility]), Erectile dysfunction due to diseases classified elsewhere, Type 2 diabetes mellitus without complication, without long-term current use of insulin ([facility]), and Need for vaccination were pertinent to this visit.    Allergies: Patient has no known allergies.  Current medicines (including changes today)  Current Outpatient Prescriptions   Medication Sig Dispense Refill   • citalopram (CELEXA) 10 MG tablet Week 1-2: Take 1/2 tab po daily. Week 3-4: Take 1/2 tab po every other day then discontinue 30 Tab 0   • sildenafil  citrate (VIAGRA) 50 MG tablet Take 1 Tab by mouth as needed for Erectile Dysfunction. 10 Tab 3   • predniSONE (DELTASONE) 20 MG Tab Take 2 Tabs by mouth every day. Day 1-5 then switch to naproxen 10 Tab 0   • tizanidine (ZANAFLEX) 2 MG tablet Take 1-2 Tabs by mouth every 6 hours as needed (rebound headache). 240 Tab 0   • hydrOXYzine HCl (ATARAX) 25 MG Tab Take 1 Tab by mouth 3 times a day as needed (rebound headache). 30 Tab 0   • naproxen (NAPROSYN) 500 MG Tab Take 1 Tab by mouth 2 times a day, with meals. When prednisone 5 days completed for rebound headache 42 Tab 0   • sumatriptan (IMITREX) 100 MG tablet TAKE 1/2 TO 1 TABLET  AT ONSET OF HEADACHE - CAN REPEAT 1 TIME IN 24 HOURS IF HEADACHE PERSISTS 2 HOURS AFTER THE FIRST DOSE 36 Tab 3   • omeprazole (PRILOSEC) 20 MG delayed-release capsule Take 1 Cap by mouth every day. 90 Cap 0   • losartan (COZAAR) 100 MG Tab Take 1 Tab by mouth every day. 90 Tab 0   • metFORMIN (GLUCOPHAGE) 500 MG Tab Take 1 Tab by mouth 2 times a day, with meals. 180 Tab 3   • Ascorbic Acid (VITAMIN C PO) Take  by mouth.     • Magnesium Chloride (MAGNESIUM DR PO) Take  by mouth.     • ascorbic acid (ASCORBIC ACID) 500 MG Tab Take 500 mg by mouth every day.     • memantine (NAMENDA) 10 MG Tab Take 1 Tab by mouth 2 times a day. (Patient not taking: Reported on 2/28/2018) 60 Tab 11   • Aspirin-Acetaminophen-Caffeine (EXCEDRIN MIGRAINE PO) Take  by mouth.     • promethazine (PHENERGAN) 25 MG Tab 25 mg phenergan once on day with severe migraine.  Will cause sleepiness.  Do not drive. 30 Tab 11     No current facility-administered medications for this visit.        He  has a past medical history of Bowel habit changes; Chickenpox; Depression; Diabetes (HCC); Frequent headaches (1/26/2016); GERD (gastroesophageal reflux disease); Divehi measles; Heart burn; Hiatus hernia syndrome; Hyperglycemia (1/26/2016); Hypertension; Indigestion; Kidney stone; Psychiatric problem; Rosacea (1/26/2016); Sleep  "apnea; and Snoring.        ROS  As stated in HPI and additionally  Gen: No weight loss or fatigue  Neuro: No change in headache character. No dizziness, numbness, tingling  CV: No chest pain, syncope, or LE edema  Endo: see HPI      Objective:     Blood pressure 124/82, pulse 70, temperature 36.6 °C (97.8 °F), height 1.778 m (5' 10\"), weight 98 kg (216 lb), SpO2 94 %. Body mass index is 30.99 kg/m².  Physical Exam:  General: Alert, oriented, in no acute distress.  Eye contact is good, speech goal directed, affect calm  CNs grossly intact.  HEENT: conjunctiva non-injected, sclera non-icteric, EOMs intact.   Gross hearing intact.  Oral mucous membranes pink and moist with no lesions. Oropharynx without erythema, or exudate.   Lungs: unlabored. clear to auscultation bilaterally with good excursion.  CV: regular rate and rhythm   Ext: no edema, normal color and temperature.   Skin: No rashes or lesions in visible areas  Gait steady.     Assessment and Plan:   Assessment/Plan:  1. Analgesic rebound headache  Not controlled. Using Imitrex at least 2 times daily. Star the following 3 weeks medication regimen to attempt to get out of this rebound headache and then f/u with neurology for further management. Email sent to him with specific instructions   - predniSONE (DELTASONE) 20 MG Tab; Take 2 Tabs by mouth every day. Day 1-5 then switch to naproxen  Dispense: 10 Tab; Refill: 0  - tizanidine (ZANAFLEX) 2 MG tablet; Take 1-2 Tabs by mouth every 6 hours as needed (rebound headache).  Dispense: 240 Tab; Refill: 0  - hydrOXYzine HCl (ATARAX) 25 MG Tab; Take 1 Tab by mouth 3 times a day as needed (rebound headache).  Dispense: 30 Tab; Refill: 0  - naproxen (NAPROSYN) 500 MG Tab; Take 1 Tab by mouth 2 times a day, with meals. When prednisone 5 days completed for rebound headache  Dispense: 42 Tab; Refill: 0    2. Recurrent major depressive disorder, in full remission (HCC)  Stable. Have given him a 4 weeks taper to attempt to " discontinue this. f/u if depression worsening  - citalopram (CELEXA) 10 MG tablet; Week 1-2: Take 1/2 tab po daily. Week 3-4: Take 1/2 tab po every other day then discontinue  Dispense: 30 Tab; Refill: 0  - CBC WITH DIFFERENTIAL; Future  - TSH WITH REFLEX TO FT4; Future    3. Erectile dysfunction due to diseases classified elsewhere  New problem. Check labs. No contraindications to viagra use. Educated on side effects. Attempt to dc citalopram   - sildenafil citrate (VIAGRA) 50 MG tablet; Take 1 Tab by mouth as needed for Erectile Dysfunction.  Dispense: 10 Tab; Refill: 3  - CBC WITH DIFFERENTIAL; Future  - TSH WITH REFLEX TO FT4; Future  - PROSTATE SPECIFIC AG SCREENING; Future  - PROLACTIN; Future    4. Type 2 diabetes mellitus without complication, without long-term current use of insulin (HCC)  Stable continue metformin f/u 6 months  - COMP METABOLIC PANEL; Future  - HEMOGLOBIN A1C; Future  - POCT Hemoglobin A1C  - LIPID PROFILE; Future  - MICROALBUMIN CREAT RATIO URINE; Future    5. Need for vaccination  I have placed the below orders and discussed them with an approved delegating provider. The MA is performing the below orders under the direction of Dr. Machuca  - HEPATITIS B VACCINE ADULT IM       Follow up:  Return 2 weeks after you start the rebound headache regimen.    Educated in proper administration of medication(s) ordered today including safety, possible SE, risks, benefits, rationale and alternatives to therapy.   Supportive care, differential diagnoses, and indications for immediate follow-up discussed with patient.    Pathogenesis of diagnosis discussed including typical length and natural progression.    Instructed to return to clinic or nearest emergency department for any change in condition, further concerns, or worsening of symptoms.  Patient states understanding of the plan of care and discharge instructions.      Please note that this dictation was created using voice recognition software. I  have made every reasonable attempt to correct obvious errors, but I expect that there are errors of grammar and possibly content that I did not discover before finalizing the note.    Followup: Return 2 weeks after you start the rebound headache regimen. sooner should new symptoms or problems arise.

## 2018-05-10 NOTE — TELEPHONE ENCOUNTER
DOCUMENTATION OF PAR STATUS:    1. Name of Medication & Dose: tizanidine (ZANAFLEX) 2 MG tablet      2. Name of Prescription Coverage Company & phone #: [employer]    3. Date Prior Auth Submitted: 5/9/18    5. Prior Auth Status? Pending    6. Patient Notified: N\A

## 2018-05-24 ENCOUNTER — APPOINTMENT (OUTPATIENT)
Dept: NEUROLOGY | Facility: MEDICAL CENTER | Age: 58
End: 2018-05-24
Payer: COMMERCIAL

## 2018-06-14 NOTE — ASSESSMENT & PLAN NOTE
Controlled: yes  Medications: metformin 500 mg BID  Statin: no  ACEI/ARB: yes  Denies any polyuria, polydipsia, polyphagia, blurred or cloudy vision, rash or thrush, or numbness or tingling of feet. Last dilated retinal eye exam was March 2017    Ref. Range 11/23/2016 07:51 11/25/2017 07:40   Glycohemoglobin Latest Ref Range: 0.0 - 5.6 % 6.3 (H) 6.5 (H)   Estim. Avg Glu Latest Units: mg/dL 134 140     
Ongoing chronic problem currently following neurology managed with Botox injections and sumitriptan 50 mg BID.  Headache pain is in posterior neck, occipital region, and forehead.   He fell head first off 12 foot ladder and had LOC in the past. Since then he has had posterior neck and occipital pain. He believes this is causing his migraines.   He has had MRI of brain and MRA of head/neck that were normal.   
Ongoing problem. There is pain in posterior neck. Every once in awhile neck feels stiff. There is no radiating arm pain, numbness, or weakness.  6/23/2015 8:59 AM    HISTORY/REASON FOR EXAM:  Neck pain.  .    TECHNIQUE/EXAM DESCRIPTION AND NUMBER OF VIEWS:  Cervical spine series, 3 views.    COMPARISON:  None.      FINDINGS:  There is minimal retrolisthesis at C3-4 and also at C5-6. There are no compression fractures.  There is disc space narrowing and osteophyte at C4-5 and C5-6. There is probably a small amount of osteophyte at C3-4.    There is mild to moderate arthrosis in the facets, most advanced at C7-T1.  No soft tissue abnormality is identified.   Impression       1.  Moderate multilevel cervical spondylosis.       
yes

## 2018-07-08 DIAGNOSIS — K21.9 GASTROESOPHAGEAL REFLUX DISEASE, ESOPHAGITIS PRESENCE NOT SPECIFIED: ICD-10-CM

## 2018-07-10 RX ORDER — OMEPRAZOLE 20 MG/1
CAPSULE, DELAYED RELEASE ORAL
Qty: 90 CAP | Refills: 3 | Status: SHIPPED | OUTPATIENT
Start: 2018-07-10 | End: 2019-05-17 | Stop reason: SDUPTHER

## 2018-08-15 RX ORDER — CITALOPRAM 20 MG/1
TABLET ORAL
Qty: 45 TAB | Refills: 3 | OUTPATIENT
Start: 2018-08-15

## 2018-08-15 RX ORDER — CITALOPRAM HYDROBROMIDE 10 MG/1
10 TABLET ORAL DAILY
Qty: 90 TAB | Refills: 3 | Status: SHIPPED | OUTPATIENT
Start: 2018-08-15 | End: 2018-11-12

## 2018-09-11 DIAGNOSIS — I10 ESSENTIAL HYPERTENSION: ICD-10-CM

## 2018-09-12 RX ORDER — LOSARTAN POTASSIUM 100 MG/1
TABLET ORAL
Qty: 90 TAB | Refills: 3 | Status: SHIPPED | OUTPATIENT
Start: 2018-09-12 | End: 2022-09-07

## 2018-09-21 ENCOUNTER — APPOINTMENT (OUTPATIENT)
Dept: SLEEP MEDICINE | Facility: MEDICAL CENTER | Age: 58
End: 2018-09-21
Payer: COMMERCIAL

## 2018-10-08 ENCOUNTER — OFFICE VISIT (OUTPATIENT)
Dept: MEDICAL GROUP | Facility: MEDICAL CENTER | Age: 58
End: 2018-10-08
Payer: COMMERCIAL

## 2018-10-08 ENCOUNTER — TELEPHONE (OUTPATIENT)
Dept: MEDICAL GROUP | Facility: MEDICAL CENTER | Age: 58
End: 2018-10-08

## 2018-10-08 VITALS
SYSTOLIC BLOOD PRESSURE: 126 MMHG | HEIGHT: 70 IN | HEART RATE: 68 BPM | OXYGEN SATURATION: 98 % | BODY MASS INDEX: 31.5 KG/M2 | RESPIRATION RATE: 16 BRPM | DIASTOLIC BLOOD PRESSURE: 78 MMHG | TEMPERATURE: 98.2 F | WEIGHT: 220 LBS

## 2018-10-08 DIAGNOSIS — E66.9 OBESITY (BMI 30-39.9): ICD-10-CM

## 2018-10-08 DIAGNOSIS — F32.5 MAJOR DEPRESSIVE DISORDER WITH SINGLE EPISODE, IN FULL REMISSION (HCC): ICD-10-CM

## 2018-10-08 DIAGNOSIS — M75.111 INCOMPLETE TEAR OF RIGHT ROTATOR CUFF: ICD-10-CM

## 2018-10-08 DIAGNOSIS — R51.9 FREQUENT HEADACHES: ICD-10-CM

## 2018-10-08 DIAGNOSIS — I10 ESSENTIAL HYPERTENSION: ICD-10-CM

## 2018-10-08 DIAGNOSIS — E11.9 TYPE 2 DIABETES MELLITUS WITHOUT COMPLICATION, WITHOUT LONG-TERM CURRENT USE OF INSULIN (HCC): ICD-10-CM

## 2018-10-08 LAB
HBA1C MFR BLD: 6.1 % (ref ?–5.8)
INT CON NEG: NEGATIVE
INT CON POS: POSITIVE

## 2018-10-08 PROCEDURE — 83036 HEMOGLOBIN GLYCOSYLATED A1C: CPT | Performed by: FAMILY MEDICINE

## 2018-10-08 PROCEDURE — 99214 OFFICE O/P EST MOD 30 MIN: CPT | Performed by: FAMILY MEDICINE

## 2018-10-08 RX ORDER — TOPIRAMATE 50 MG/1
50 TABLET, FILM COATED ORAL
Qty: 90 TAB | Refills: 3 | Status: SHIPPED | OUTPATIENT
Start: 2018-10-08 | End: 2019-01-04

## 2018-10-08 RX ORDER — TRIAMCINOLONE ACETONIDE 1 MG/G
CREAM TOPICAL
Qty: 45 G | Refills: 3 | Status: SHIPPED | OUTPATIENT
Start: 2018-10-08 | End: 2019-06-10

## 2018-10-08 NOTE — ASSESSMENT & PLAN NOTE
This is a chronic health problem well-controlled with current medications.  Patient's blood pressure is 126/78. The patient denies chest pain, shortness of breath or dyspnea on exertion.

## 2018-10-08 NOTE — ASSESSMENT & PLAN NOTE
This patient is planning to have a right torn rotator cuff repaired in the month of November with Dr. Thompson.

## 2018-10-08 NOTE — ASSESSMENT & PLAN NOTE
This is a chronic health problem for which the patient sees Barbara Olea at the headache clinic.  He has noted interestingly enough that he had a crown fall off of his 1 of his right bottom teeth and since that happened his chronic headaches have improved somewhat.  He still having a fair number of headaches but he has seen them lessen in intensity and occurrence.  He is getting continue to follow with Barbara.  Patient had been tried in the past on amitriptyline at 10 mg in 2017 does not remember why he stopped taking it but figures it might not have been effective.  We discussed utilizing topiramate 50 mg at bedtime and try that for the coming 4 weeks.  That might also help him lose some weight.

## 2018-10-08 NOTE — ASSESSMENT & PLAN NOTE
This is a chronic health problem for this patient that has been under excellent control through various medications over the last 12 years.  Patient has been weaning himself down on his Celexa and is now down to 5 mg daily.  He would like to eventually stop the medication.  He has the side effect of erectile dysfunction.  We discussed that he could go to every other day for a month and then stop the medication and then he will have to monitor his mood to make sure that the sad feelings and feelings of helplessness or hopelessness do not return.

## 2018-10-08 NOTE — PROGRESS NOTES
CC:  Diagnoses of Type 2 diabetes mellitus without complication, without long-term current use of insulin ([facility]), Frequent headaches, Major depressive disorder with single episode, in full remission ([facility]), Essential hypertension, Obesity (BMI 30-39.9), and Incomplete tear of right rotator cuff were pertinent to this visit.    HISTORY OF THE PRESENT ILLNESS: Patient is a 58 y.o. male. This pleasant patient is here today to establish care previously having been a patient of NICO De Jesus at the [place] [facility].  Patient informs me he is getting ready to move to [facility] and would like to establish care here in this office.  He has some chronic health problems we will be discussing today.    Health Maintenance: Completed      Type 2 diabetes mellitus without complication, without long-term current use of insulin ([facility])  This is a chronic health problem that this patient had for approximately 8 years.  Providers in the past have told him if he did lose weight and could get his weight under 200 pounds more than likely would not have diabetes anymore.  He does not check his blood sugars on a regular basis and he has not had an A1c since May.  We will do a point-of-care test today.  We will then determine what other tests need to be done.  Patient's A1c done here in the office came back at 6.1 showing excellent control of his blood sugars.  He is less certain about the control of his lipids.    Frequent headaches  This is a chronic health problem for which the patient sees Barbara Olea at the headache clinic.  He has noted interestingly enough that he had a crown fall off of his 1 of his right bottom teeth and since that happened his chronic headaches have improved somewhat.  He still having a fair number of headaches but he has seen them lessen in intensity and occurrence.  He is getting continue to follow with Barbara.  Patient had been tried in the past on amitriptyline at 10 mg in 2017 does not remember why he  stopped taking it but figures it might not have been effective.  We discussed utilizing topiramate 50 mg at bedtime and try that for the coming 4 weeks.  That might also help him lose some weight.    Major depressive disorder with single episode, in full remission (HCC)  This is a chronic health problem for this patient that has been under excellent control through various medications over the last 12 years.  Patient has been weaning himself down on his Celexa and is now down to 5 mg daily.  He would like to eventually stop the medication.  He has the side effect of erectile dysfunction.  We discussed that he could go to every other day for a month and then stop the medication and then he will have to monitor his mood to make sure that the sad feelings and feelings of helplessness or hopelessness do not return.    HTN (hypertension)  This is a chronic health problem well-controlled with current medications.  Patient's blood pressure is 126/78. The patient denies chest pain, shortness of breath or dyspnea on exertion.      Obesity (BMI 30-39.9)  This is a chronic health problem for this patient he is getting ready to move into a [place] and older community that does have a workout room.  He is going to start utilizing that and work on weight loss.  He is also cutting down on the number of sodas that he has in a day.  He is now down to 1 a day and working his way off to where he would only have those on special occasions.    Incomplete tear of right rotator cuff  This patient is planning to have a right torn rotator cuff repaired in the month of November with Dr. Thompson.    Allergies: Patient has no known allergies.    Current Outpatient Prescriptions Ordered in [facility]   Medication Sig Dispense Refill   • topiramate (TOPAMAX) 50 MG tablet Take 1 Tab by mouth every bedtime. 90 Tab 3   • triamcinolone acetonide (KENALOG) 0.1 % Cream A small amount applied twice daily until clear 45 g 3   • losartan (COZAAR) 100 MG Tab TAKE 1  TABLET EVERY DAY 90 Tab 3   • citalopram (CELEXA) 10 MG tablet Take 1 Tab by mouth every day. (Patient taking differently: Take 5 mg by mouth every day.) 90 Tab 3   • omeprazole (PRILOSEC) 20 MG delayed-release capsule TAKE 1 CAPSULE DAILY 90 Cap 3   • hydrOXYzine HCl (ATARAX) 25 MG Tab Take 1 Tab by mouth 3 times a day as needed (rebound headache). 30 Tab 0   • sumatriptan (IMITREX) 100 MG tablet TAKE 1/2 TO 1 TABLET  AT ONSET OF HEADACHE - CAN REPEAT 1 TIME IN 24 HOURS IF HEADACHE PERSISTS 2 HOURS AFTER THE FIRST DOSE 36 Tab 3   • metFORMIN (GLUCOPHAGE) 500 MG Tab Take 1 Tab by mouth 2 times a day, with meals. 180 Tab 3   • Ascorbic Acid (VITAMIN C PO) Take  by mouth.     • Magnesium Chloride (MAGNESIUM DR PO) Take  by mouth.     • ascorbic acid (ASCORBIC ACID) 500 MG Tab Take 500 mg by mouth every day.     • Aspirin-Acetaminophen-Caffeine (EXCEDRIN MIGRAINE PO) Take  by mouth.     • sildenafil citrate (VIAGRA) 50 MG tablet Take 1 Tab by mouth as needed for Erectile Dysfunction. 10 Tab 3     No current Epic-ordered facility-administered medications on file.        Past Medical History:   Diagnosis Date   • Bowel habit changes    • Chickenpox    • Depression    • Diabetes (HCC)     orals meds   • Frequent headaches 1/26/2016   • GERD (gastroesophageal reflux disease)    • Arabic measles    • Heart burn    • Hiatus hernia syndrome    • Hyperglycemia 1/26/2016   • Hypertension    • Incomplete tear of right rotator cuff 10/8/2018   • Indigestion    • Kidney stone    • Major depressive disorder with single episode, in full remission (HCC) 11/22/2010   • Psychiatric problem     states depression/anxiety   • Rosacea 1/26/2016   • Sleep apnea     cpap   • Snoring        Past Surgical History:   Procedure Laterality Date   • HIP ARTHROPLASTY TOTAL Left 12/30/2016    Procedure: HIP ARTHROPLASTY TOTAL;  Surgeon: Dc Sanchez M.D.;  Location: SURGERY [facility];  Service:    • VENTRAL HERNIA REPAIR  9/28/2016     Procedure: VENTRAL HERNIA REPAIR W/MESH;  Surgeon: Corona Delvalle M.D.;  Location: SURGERY [facility];  Service:    • SEPTOTURBINOPLASTY  8/17/2010    Performed by VERONICA FRIEDMAN at SURGERY SAME DAY [facility] ORS   • OTHER  4/2010    kidney stone   • MENISCECTOMY Right 2010   • ELBOW ORIF Left 10/2004   • ACL REPAIR Left 8/2002    lt   • ARTHROSCOPY, KNEE     • HIP REPLACEMENT, TOTAL     • OTHER      lasik rt eye   • OTHER ORTHOPEDIC SURGERY      broken rt arm   • SINUSCOPE         Social History   Substance Use Topics   • Smoking status: Former Smoker     Years: 5.00     Types: Cigarettes     Quit date: 1/1/1982   • Smokeless tobacco: Never Used      Comment: quit 35 years ago   • Alcohol use No       Social History     Social History Narrative    Patient is  with an adult son. He works full time as a        Family History   Problem Relation Age of Onset   • Diabetes Father    • Heart Attack Father 60   • Cancer Neg Hx    • Suicide Attempts Neg Hx        ROS:     - Constitutional: Negative for fever, chills, unexpected weight change, and fatigue/generalized weakness.     - HEENT: Negative for headaches, vision changes, hearing changes, ear pain, ear discharge, rhinorrhea, sinus congestion, sore throat, and neck pain.      - Respiratory: Negative for cough, sputum production, chest congestion, dyspnea, wheezing, and crackles.      - Cardiovascular: Negative for chest pain, palpitations, orthopnea, and bilateral lower extremity edema.     - Gastrointestinal: Negative for heartburn, nausea, vomiting, abdominal pain, hematochezia, melena, diarrhea, constipation, and greasy/foul-smelling stools.     - Genitourinary: Negative for dysuria, polyuria, hematuria, pyuria, urinary urgency, and urinary incontinence.     - Musculoskeletal: Negative for myalgias, back pain, and joint pain.     - Skin: Negative for rash, itching, cyanotic skin color change.     - Neurological: Negative for dizziness,  "tingling, tremors, focal sensory deficit, focal weakness and headaches.     - Endo/Heme/Allergies: Does not bruise/bleed easily.     - Psychiatric/Behavioral: Negative for depression, suicidal/homicidal ideation and memory loss.        - NOTE: All other systems reviewed and are negative, except as in HPI.      .      Exam: Blood pressure 126/78, pulse 68, temperature 36.8 °C (98.2 °F), temperature source Temporal, resp. rate 16, height 1.778 m (5' 10\"), weight 99.8 kg (220 lb), SpO2 98 %. Body mass index is 31.57 kg/m².    General: Normal appearing. No distress.  HEENT: Normocephalic. Eyes conjunctiva clear lids without ptosis, pupils equal and reactive to light accommodation, ears normal shape and contour, canals are clear bilaterally, tympanic membranes are benign, nasal mucosa benign, oropharynx is without erythema, edema or exudates.   Neck: Supple without JVD or bruit. Thyroid is not enlarged.  Pulmonary: Clear to ausculation.  Normal effort. No rales, ronchi, or wheezing.  Cardiovascular: Regular rate and rhythm without murmur. Carotid and radial pulses are intact and equal bilaterally.  Abdomen: Soft, nontender, nondistended. Normal bowel sounds. Liver and spleen are not palpable  Neurologic: Grossly nonfocal  Lymph: No cervical, supraclavicular or axillary lymph nodes are palpable  Skin: Warm and dry.  No obvious lesions.  Musculoskeletal: Normal gait. No extremity cyanosis, clubbing, or edema.  Psych: Normal mood and affect. Alert and oriented x3. Judgment and insight is normal.    Please note that this dictation was created using voice recognition software. I have made every reasonable attempt to correct obvious errors, but I expect that there are errors of grammar and possibly content that I did not discover before finalizing the note.      Assessment/Plan  1. Type 2 diabetes mellitus without complication, without long-term current use of insulin (HCC)  Well-controlled according to his current A1c of 6.1. "  We will get baseline blood work and see him back.  We will also get his eye exam which was done at [facility] within the last 3 months.  Requesting those records.  - POCT  A1C  - COMP METABOLIC PANEL; Future  - LIPID PROFILE; Future  - CBC WITH DIFFERENTIAL; Future  - TSH WITH REFLEX TO FT4; Future    2. Frequent headaches  Uncontrolled, patient still having frequent headaches.  I would like to try Topamax 50 mg at bedtime to see if this will help suppress his appetite help him to lose weight as well as take care of his headaches.    3. Major depressive disorder with single episode, in full remission (HCC)  Well-controlled with current medications.  Patient has been weaning his dose downward and is ready to try going off the medication.  He will take Lexapro 5 mg every other day for 30 days and then stop the medication watching for return of feelings of helplessness or hopelessness    4. Essential hypertension  Well-controlled continue current meds    5. Obesity (BMI 30-39.9)  Uncontrolled, patient working on weight loss  - Patient identified as having weight management issue.  Appropriate orders and counseling given.    6. Incomplete tear of right rotator cuff  Patient planning complete surgery repair within the coming month.

## 2018-10-08 NOTE — ASSESSMENT & PLAN NOTE
This is a chronic health problem for this patient he is getting ready to move into a [place] and older community that does have a workout room.  He is going to start utilizing that and work on weight loss.  He is also cutting down on the number of sodas that he has in a day.  He is now down to 1 a day and working his way off to where he would only have those on special occasions.

## 2018-10-08 NOTE — TELEPHONE ENCOUNTER
VOICEMAIL  1. Caller Name: Miguel Angel Fernández                      Call Back Number: [phone] (home)      2. Message: Patient called left message stating that he wanted us to get his records from is pervious doctor. I have called patient and left him a message stating that we do not need to get records as his pervious doctor was a [facility] doctor.     3. Patient approves office to leave a detailed voicemail/MyChart message: N\A

## 2018-10-25 DIAGNOSIS — Z01.810 PRE-OPERATIVE CARDIOVASCULAR EXAMINATION: ICD-10-CM

## 2018-10-25 DIAGNOSIS — Z01.812 PRE-OPERATIVE LABORATORY EXAMINATION: ICD-10-CM

## 2018-10-25 LAB
ANION GAP SERPL CALC-SCNC: 7 MMOL/L (ref 0–11.9)
BUN SERPL-MCNC: 16 MG/DL (ref 8–22)
CALCIUM SERPL-MCNC: 9.9 MG/DL (ref 8.5–10.5)
CHLORIDE SERPL-SCNC: 103 MMOL/L (ref 96–112)
CO2 SERPL-SCNC: 29 MMOL/L (ref 20–33)
CREAT SERPL-MCNC: 1 MG/DL (ref 0.5–1.4)
EKG IMPRESSION: NORMAL
GLUCOSE SERPL-MCNC: 122 MG/DL (ref 65–99)
POTASSIUM SERPL-SCNC: 4.5 MMOL/L (ref 3.6–5.5)
SODIUM SERPL-SCNC: 139 MMOL/L (ref 135–145)

## 2018-10-25 PROCEDURE — 93005 ELECTROCARDIOGRAM TRACING: CPT

## 2018-10-25 PROCEDURE — 80048 BASIC METABOLIC PNL TOTAL CA: CPT

## 2018-10-25 PROCEDURE — 93010 ELECTROCARDIOGRAM REPORT: CPT | Performed by: INTERNAL MEDICINE

## 2018-10-25 PROCEDURE — 36415 COLL VENOUS BLD VENIPUNCTURE: CPT

## 2018-10-25 RX ORDER — MAGNESIUM OXIDE 400 MG/1
400 TABLET ORAL DAILY
COMMUNITY

## 2018-10-25 RX ORDER — MULTIVIT WITH MINERALS/LUTEIN
1 TABLET ORAL DAILY
COMMUNITY
End: 2022-09-07

## 2018-10-25 RX ORDER — LORATADINE 10 MG/1
10 TABLET ORAL DAILY
COMMUNITY

## 2018-10-25 RX ORDER — LANOLIN ALCOHOL/MO/W.PET/CERES
1000 CREAM (GRAM) TOPICAL DAILY
COMMUNITY
End: 2022-09-07

## 2018-10-25 NOTE — OR NURSING
"Pre-admit appointment completed. \"Preparing for your procedure\" sheet given to pt along with verbal and written instructions. Pt instructed to continue regularly prescribed medications through the day before surgery. Pt instructed to take the following medications the day of surgery with a sip of water, per anesthesia protocol; omeprazole.     Pt to bring CPAP DOS.    Pt to inform Dr Thompson of C/O some laryngitis and sore throat in past couple days. Denies fevers.   "

## 2018-10-31 ENCOUNTER — TELEPHONE (OUTPATIENT)
Dept: MEDICAL GROUP | Facility: MEDICAL CENTER | Age: 58
End: 2018-10-31

## 2018-10-31 NOTE — TELEPHONE ENCOUNTER
ESTABLISHED PATIENT PRE-VISIT PLANNING     Note: Patient will not be contacted if there is no indication to call.     1.  Reviewed notes from the last few office visits within the medical group: Yes 10/8/2018    2.  If any orders were placed at last visit or intended to be done for this visit (i.e. 6 mos follow-up), do we have Results/Consult Notes?        •  Labs - Labs ordered, NOT completed. Patient advised to complete prior to next appointment.   Note: If patient appointment is for lab review and patient did not complete labs, check with provider if OK to reschedule patient until labs completed.       •  Imaging - Imaging was not ordered at last office visit.       •  Referrals - No referrals were ordered at last office visit.    3. Is this appointment scheduled as a Hospital Follow-Up? No    4.  Immunizations were updated in [facility] using WebIZ?: Yes       •  Web Iz Recommendations: ZOSTAVAX (Shingles)    5.  Patient is due for the following Health Maintenance Topics:   Health Maintenance Due   Topic Date Due   • IMM ZOSTER VACCINES (1 of 2) 03/13/2008   • RETINAL SCREENING  03/08/2018           6.  MDX printed for Provider? NO    7.  Patient was informed to arrive 15 min prior to their scheduled appointment and bring in their medication bottles.      *Patient called and left  reminding him to get labs done prior to appt. MA ask patient about Retinal screening.

## 2018-11-01 ENCOUNTER — HOSPITAL ENCOUNTER (OUTPATIENT)
Facility: MEDICAL CENTER | Age: 58
End: 2018-11-01
Attending: ORTHOPAEDIC SURGERY | Admitting: ORTHOPAEDIC SURGERY
Payer: COMMERCIAL

## 2018-11-01 VITALS
HEART RATE: 72 BPM | SYSTOLIC BLOOD PRESSURE: 133 MMHG | RESPIRATION RATE: 18 BRPM | OXYGEN SATURATION: 92 % | BODY MASS INDEX: 30.55 KG/M2 | HEIGHT: 70 IN | TEMPERATURE: 97.3 F | DIASTOLIC BLOOD PRESSURE: 76 MMHG | WEIGHT: 213.41 LBS

## 2018-11-01 DIAGNOSIS — G89.18 ACUTE POST-OPERATIVE PAIN: ICD-10-CM

## 2018-11-01 DIAGNOSIS — M75.111 INCOMPLETE ROTATOR CUFF TEAR OR RUPTURE OF RIGHT SHOULDER, NOT SPECIFIED AS TRAUMATIC: ICD-10-CM

## 2018-11-01 LAB — GLUCOSE BLD-MCNC: 109 MG/DL (ref 65–99)

## 2018-11-01 PROCEDURE — 160036 HCHG PACU - EA ADDL 30 MINS PHASE I: Performed by: ORTHOPAEDIC SURGERY

## 2018-11-01 PROCEDURE — 700102 HCHG RX REV CODE 250 W/ 637 OVERRIDE(OP)

## 2018-11-01 PROCEDURE — 700101 HCHG RX REV CODE 250: Performed by: ORTHOPAEDIC SURGERY

## 2018-11-01 PROCEDURE — 82962 GLUCOSE BLOOD TEST: CPT

## 2018-11-01 PROCEDURE — 501838 HCHG SUTURE GENERAL: Performed by: ORTHOPAEDIC SURGERY

## 2018-11-01 PROCEDURE — 500028 HCHG ARTHROWAND TURBOVAC 3.5/90 SUCT.: Performed by: ORTHOPAEDIC SURGERY

## 2018-11-01 PROCEDURE — 160022 HCHG BLOCK: Performed by: ORTHOPAEDIC SURGERY

## 2018-11-01 PROCEDURE — 500144 HCHG CANNULA KIT, UNIV GREY-SHOULDER: Performed by: ORTHOPAEDIC SURGERY

## 2018-11-01 PROCEDURE — 500151 HCHG CANNULA, THRDED 8.4: Performed by: ORTHOPAEDIC SURGERY

## 2018-11-01 PROCEDURE — A9270 NON-COVERED ITEM OR SERVICE: HCPCS

## 2018-11-01 PROCEDURE — 160035 HCHG PACU - 1ST 60 MINS PHASE I: Performed by: ORTHOPAEDIC SURGERY

## 2018-11-01 PROCEDURE — 160046 HCHG PACU - 1ST 60 MINS PHASE II: Performed by: ORTHOPAEDIC SURGERY

## 2018-11-01 PROCEDURE — 160041 HCHG SURGERY MINUTES - EA ADDL 1 MIN LEVEL 4: Performed by: ORTHOPAEDIC SURGERY

## 2018-11-01 PROCEDURE — 160048 HCHG OR STATISTICAL LEVEL 1-5: Performed by: ORTHOPAEDIC SURGERY

## 2018-11-01 PROCEDURE — 700101 HCHG RX REV CODE 250

## 2018-11-01 PROCEDURE — L3670 SO ACRO/CLAV CAN WEB PRE OTS: HCPCS | Performed by: ORTHOPAEDIC SURGERY

## 2018-11-01 PROCEDURE — 160029 HCHG SURGERY MINUTES - 1ST 30 MINS LEVEL 4: Performed by: ORTHOPAEDIC SURGERY

## 2018-11-01 PROCEDURE — 502581 HCHG PACK, SHOULDER ARTHROSCOPY: Performed by: ORTHOPAEDIC SURGERY

## 2018-11-01 PROCEDURE — 160002 HCHG RECOVERY MINUTES (STAT): Performed by: ORTHOPAEDIC SURGERY

## 2018-11-01 PROCEDURE — 160025 RECOVERY II MINUTES (STATS): Performed by: ORTHOPAEDIC SURGERY

## 2018-11-01 PROCEDURE — 700111 HCHG RX REV CODE 636 W/ 250 OVERRIDE (IP)

## 2018-11-01 PROCEDURE — A4450 NON-WATERPROOF TAPE: HCPCS | Performed by: ORTHOPAEDIC SURGERY

## 2018-11-01 PROCEDURE — 160009 HCHG ANES TIME/MIN: Performed by: ORTHOPAEDIC SURGERY

## 2018-11-01 RX ORDER — HYDROMORPHONE HYDROCHLORIDE 2 MG/ML
0.1 INJECTION, SOLUTION INTRAMUSCULAR; INTRAVENOUS; SUBCUTANEOUS
Status: DISCONTINUED | OUTPATIENT
Start: 2018-11-01 | End: 2018-11-01 | Stop reason: HOSPADM

## 2018-11-01 RX ORDER — HYDROMORPHONE HYDROCHLORIDE 2 MG/ML
0.4 INJECTION, SOLUTION INTRAMUSCULAR; INTRAVENOUS; SUBCUTANEOUS
Status: DISCONTINUED | OUTPATIENT
Start: 2018-11-01 | End: 2018-11-01 | Stop reason: HOSPADM

## 2018-11-01 RX ORDER — OXYCODONE AND ACETAMINOPHEN 7.5; 325 MG/1; MG/1
1-2 TABLET ORAL EVERY 6 HOURS PRN
Qty: 56 TAB | Refills: 0 | Status: SHIPPED | OUTPATIENT
Start: 2018-11-01 | End: 2018-11-08

## 2018-11-01 RX ORDER — HYDROMORPHONE HYDROCHLORIDE 2 MG/ML
0.2 INJECTION, SOLUTION INTRAMUSCULAR; INTRAVENOUS; SUBCUTANEOUS
Status: DISCONTINUED | OUTPATIENT
Start: 2018-11-01 | End: 2018-11-01 | Stop reason: HOSPADM

## 2018-11-01 RX ORDER — MEPERIDINE HYDROCHLORIDE 25 MG/ML
12.5 INJECTION INTRAMUSCULAR; INTRAVENOUS; SUBCUTANEOUS
Status: DISCONTINUED | OUTPATIENT
Start: 2018-11-01 | End: 2018-11-01 | Stop reason: HOSPADM

## 2018-11-01 RX ORDER — OXYCODONE HYDROCHLORIDE 5 MG/1
5 TABLET ORAL
Status: DISCONTINUED | OUTPATIENT
Start: 2018-11-01 | End: 2018-11-01 | Stop reason: HOSPADM

## 2018-11-01 RX ORDER — OXYCODONE HCL 5 MG/5 ML
10 SOLUTION, ORAL ORAL
Status: DISCONTINUED | OUTPATIENT
Start: 2018-11-01 | End: 2018-11-01 | Stop reason: HOSPADM

## 2018-11-01 RX ORDER — SODIUM CHLORIDE 9 MG/ML
1000 INJECTION, SOLUTION INTRAVENOUS
Status: DISCONTINUED | OUTPATIENT
Start: 2018-11-01 | End: 2018-11-01 | Stop reason: HOSPADM

## 2018-11-01 RX ORDER — ONDANSETRON 2 MG/ML
4 INJECTION INTRAMUSCULAR; INTRAVENOUS
Status: DISCONTINUED | OUTPATIENT
Start: 2018-11-01 | End: 2018-11-01 | Stop reason: HOSPADM

## 2018-11-01 RX ORDER — GINSENG 100 MG
CAPSULE ORAL
Status: DISCONTINUED | OUTPATIENT
Start: 2018-11-01 | End: 2018-11-01 | Stop reason: HOSPADM

## 2018-11-01 RX ORDER — BUPIVACAINE HYDROCHLORIDE AND EPINEPHRINE 5; 5 MG/ML; UG/ML
INJECTION, SOLUTION EPIDURAL; INTRACAUDAL; PERINEURAL
Status: DISCONTINUED | OUTPATIENT
Start: 2018-11-01 | End: 2018-11-01 | Stop reason: HOSPADM

## 2018-11-01 RX ORDER — LABETALOL HYDROCHLORIDE 5 MG/ML
5 INJECTION, SOLUTION INTRAVENOUS
Status: DISCONTINUED | OUTPATIENT
Start: 2018-11-01 | End: 2018-11-01 | Stop reason: HOSPADM

## 2018-11-01 RX ORDER — ACETAMINOPHEN 500 MG
TABLET ORAL
Status: COMPLETED
Start: 2018-11-01 | End: 2018-11-01

## 2018-11-01 RX ORDER — OXYCODONE HYDROCHLORIDE 10 MG/1
10 TABLET ORAL
Status: DISCONTINUED | OUTPATIENT
Start: 2018-11-01 | End: 2018-11-01 | Stop reason: HOSPADM

## 2018-11-01 RX ORDER — ACETAMINOPHEN 500 MG
1000 TABLET ORAL ONCE
Status: COMPLETED | OUTPATIENT
Start: 2018-11-01 | End: 2018-11-01

## 2018-11-01 RX ORDER — GABAPENTIN 300 MG/1
300 CAPSULE ORAL ONCE
Status: COMPLETED | OUTPATIENT
Start: 2018-11-01 | End: 2018-11-01

## 2018-11-01 RX ORDER — HALOPERIDOL 5 MG/ML
1 INJECTION INTRAMUSCULAR
Status: DISCONTINUED | OUTPATIENT
Start: 2018-11-01 | End: 2018-11-01 | Stop reason: HOSPADM

## 2018-11-01 RX ORDER — DIPHENHYDRAMINE HYDROCHLORIDE 50 MG/ML
12.5 INJECTION INTRAMUSCULAR; INTRAVENOUS
Status: DISCONTINUED | OUTPATIENT
Start: 2018-11-01 | End: 2018-11-01 | Stop reason: HOSPADM

## 2018-11-01 RX ORDER — OXYCODONE HCL 5 MG/5 ML
5 SOLUTION, ORAL ORAL
Status: DISCONTINUED | OUTPATIENT
Start: 2018-11-01 | End: 2018-11-01 | Stop reason: HOSPADM

## 2018-11-01 RX ORDER — GABAPENTIN 300 MG/1
CAPSULE ORAL
Status: COMPLETED
Start: 2018-11-01 | End: 2018-11-01

## 2018-11-01 RX ADMIN — GABAPENTIN 300 MG: 300 CAPSULE ORAL at 06:30

## 2018-11-01 RX ADMIN — ACETAMINOPHEN 1000 MG: 500 TABLET, COATED ORAL at 06:30

## 2018-11-01 RX ADMIN — Medication 1000 MG: at 06:30

## 2018-11-01 RX ADMIN — SODIUM CHLORIDE 1000 ML: 9 INJECTION, SOLUTION INTRAVENOUS at 06:22

## 2018-11-01 RX ADMIN — LIDOCAINE HYDROCHLORIDE 0.5 ML: 10 INJECTION, SOLUTION INFILTRATION; PERINEURAL at 06:22

## 2018-11-01 ASSESSMENT — PAIN SCALES - GENERAL
PAINLEVEL_OUTOF10: 0
PAINLEVEL_OUTOF10: 0
PAINLEVEL_OUTOF10: ASSUMED PAIN PRESENT
PAINLEVEL_OUTOF10: 0
PAINLEVEL_OUTOF10: 1
PAINLEVEL_OUTOF10: 0
PAINLEVEL_OUTOF10: ASSUMED PAIN PRESENT
PAINLEVEL_OUTOF10: 0

## 2018-11-01 NOTE — DISCHARGE INSTRUCTIONS
ACTIVITY: Rest and take it easy for the first 24 hours.  A responsible adult is recommended to remain with you during that time.  It is normal to feel sleepy.  We encourage you to not do anything that requires balance, judgment or coordination.    MILD FLU-LIKE SYMPTOMS ARE NORMAL. YOU MAY EXPERIENCE GENERALIZED MUSCLE ACHES, THROAT IRRITATION, HEADACHE AND/OR SOME NAUSEA.    FOR 24 HOURS DO NOT:  Drive, operate machinery or run household appliances.  Drink beer or alcoholic beverages.   Make important decisions or sign legal documents.    SPECIAL INSTRUCTIONS: Ice and elevate. Call for incision redness, heat, pus drainage, edema, or fever, chills, nausea, vomiting, diarrhea. Take daily stool softeners or laxatives prn as narcotic pain medications cause constipation.  Follow up with Dr. Thompson in 7-10 days as scheduled. . May come out of brace for showering and Codman Exercises. Sling as needed for comfort, activity as tolerated, no excessive overhead reaching.    DIET: To avoid nausea, slowly advance diet as tolerated, avoiding spicy or greasy foods for the first day.  Add more substantial food to your diet according to your physician's instructions.  Babies can be fed formula or breast milk as soon as they are hungry.  INCREASE FLUIDS AND FIBER TO AVOID CONSTIPATION.    SURGICAL DRESSING/BATHING:  Ok to shower but keep incision dry and covered until staples/stitches removed. Keep dressings in place for 5 days then ok to remove dressing and place band aids.    FOLLOW-UP APPOINTMENT:  A follow-up appointment should be arranged with your doctor; call to schedule. Start therapy ASAP    You should CALL YOUR PHYSICIAN if you develop:  Fever greater than 101 degrees F.  Pain not relieved by medication, or persistent nausea or vomiting.  Excessive bleeding (blood soaking through dressing) or unexpected drainage from the wound.  Extreme redness or swelling around the incision site, drainage of pus or foul smelling  drainage.  Inability to urinate or empty your bladder within 8 hours.    You should call 911 if you develop problems with breathing or chest pain.    If you are unable to contact your doctor or surgical center, you should go to the nearest emergency room or urgent care center.      Dr Thompson's telephone #: [phone]    If any questions arise, call your doctor.  If your doctor is not available, please feel free to call the Surgical Center at [phone].  The Center is open Monday through Friday from 7AM to 7PM.      You can also call the HEALTH HOTLINE open 24 hours/day, 7 days/week and speak to a nurse at [phone], or toll free at [phone].    A registered nurse may call you a few days after your surgery to see how you are doing after your procedure.    MEDICATIONS: Resume taking daily medication.  Take prescribed pain medication with food.  If no medication is prescribed, you may take non-aspirin pain medication if needed.  PAIN MEDICATION CAN BE VERY CONSTIPATING.  Take a stool softener or laxative such as senokot, pericolace, or milk of magnesia if needed.    Prescription given for Percocet.      Last pain medication given: none    If your physician has prescribed pain medication that includes Acetaminophen (Tylenol), do not take additional Acetaminophen (Tylenol) while taking the prescribed medication.    Depression / Suicide Risk    As you are discharged from this [facility], it is important to learn how to keep safe from harming yourself.    Recognize the warning signs:  · Abrupt changes in personality, positive or negative- including increase in energy   · Giving away possessions  · Change in eating patterns- significant weight changes-  positive or negative  · Change in sleeping patterns- unable to sleep or sleeping all the time   · Unwillingness or inability to communicate  · Depression  · Unusual sadness, discouragement and loneliness  · Talk of wanting to die  · Neglect of  personal appearance   · Rebelliousness- reckless behavior  · Withdrawal from people/activities they love  · Confusion- inability to concentrate     If you or a loved one observes any of these behaviors or has concerns about self-harm, here's what you can do:  · Talk about it- your feelings and reasons for harming yourself  · Remove any means that you might use to hurt yourself (examples: pills, rope, extension cords, firearm)  · Get professional help from the community (Mental Health, Substance Abuse, psychological counseling)  · Do not be alone:Call your Safe Contact- someone whom you trust who will be there for you.  · Call your local CRISIS HOTLINE 993-2867 or 949-925-4243  · Call your local Children's Mobile Crisis Response Team [place] (860) 385-4798 or www.NuMedii  · Call the toll free National Suicide Prevention Hotlines   · National Suicide Prevention Lifeline 207-809-BQMQ (7775)  · National Hope Line Network 800-SUICIDE (115-3703)

## 2018-11-01 NOTE — OR NURSING
0800: Report rec'd, care assumed. Pt dozing, awakens easily. Denies pain or nausea. Able to sense touch to hand/fingers and move same. Taking sips.  0840: Pt more awake. maintaining room air sat > 90% w/o CPAP. Remains pain/nausea free.  0905: No change. Meets criteria for stage ll.

## 2018-11-01 NOTE — OP REPORT
DATE OF SERVICE: 11/1/18    PREOPERATIVE DIAGNOSIS: right possible rotator cuff tear, type 2 acromion, acromioclavicular arthritis, SLAP tear     POSTOPERATIVE DIAGNOSIS: right shoulder without rotator cuff tear, type 2 acromion, acromioclavicular arthritis, SLAP tear     PROCEDURE PERFORMED: right shoulder arthroscopic subacromioal decompression, distal clavicle excision,  with labral debridement.      SURGEON: Nic Thompson MD    ASSISTANT: HARITHA Gil    ANESTHESIA: General with interscalene block for posterior pain control.     ANESTHESIOLOGIST: Sobia    ANTIBIOTICS: Ancef    COMPLICATIONS: None.     IMPLANTS: none    INDICATIONS: The patient presents with right shoulder pain recalcitrant to conservative treatment. The patient has evidence of a righ shoulder partial thickness rotator cuff tear, type 2 acromion, labral tear on imaging. Options were discussed, including both non operative and operative treatments. Risks of surgery were discussed at length. All questions were answered. No guarantees were given.     PROCEDURE IN DETAIL: The patient was properly identified in the preoperative holding area, and the right shoulder was marked as the correct surgical site. The patient was then taken back to the operative room, and placed supine on the operating room table and underwent general anesthesia. The patient was placed in a beach chair position. The right upper extremity was sterilely prepped and draped in standard fashion.     A standard posterior portal was created. The scope was placed up the glenohumeral joint. An anterior portal was created through the rotator cuff interval just below the biceps. The was evidence of a SLAP tear, degenerative type. There was no evidence of biceps tendinosis. There was no evidence of a full-thickness rotator cuff tear. An extensive debridement was performed including a labral debridement back to a stabdl smooth edge. The rest of the joint was inspected. There was no  chondromalacia both in the humeral head and the glenoid. No loose bodies were seen.    The scope was then placed in the subacromial space. A lateral portal was created. A bursectomy was performed, exposing the anterior and lateral acromion. A 5/5 resector was used to perform a subacromial decompression removing several millimeters of bone off the anterior and lateral acromion and the through the anterior portal a distal clavicle excision was preformed with a 5/5 resector.  The rotator cuff was evaluated and no tear identified.  Excess fluid was drained. Incision were closed with 2-0 vicryl and 3-0 nylon. A total of 20ml 1% marcaine with epinephrine was injected. HARITHA Gil, was present throughout the operation as an essential part of the success of there operation assisting with patient position, instrumentation, retraction and closure.     The patient was extubated and transferred to the recovery room in stable condition.

## 2018-11-01 NOTE — OR NURSING
0600  Pt to pre op to assume care. 0642  Patient allergies and NPO status verified, home medication reconciliation completed and belongings secured. Patient verbalizes understanding of pain scale, expected course of stay and plan of care. Surgical site verified with patient. IV access established. Sequentials placed on legs

## 2018-11-01 NOTE — OR NURSING
0748: To PACU from OR via gurney, sleeping, respirations spontaneous and non-labored via OPA. Icepack applied over c/d/i R shoulder surgical dressings.  0800: Report given to YESENIA Ann and he assumed care. No pain, no nausea, CPAP put on pt, resting comfortably.

## 2018-11-01 NOTE — OR NURSING
0940- Discharge instructions discussed with patient and family/friend with both stating instructions understood. Patient denies pain to right shoulder/arm. Numbness present. Capillary refill to fingers of right hand less than 3 seconds.    0941- Patient discharged to awaiting vehicle via wheelchair.

## 2018-11-12 ENCOUNTER — OFFICE VISIT (OUTPATIENT)
Dept: MEDICAL GROUP | Facility: MEDICAL CENTER | Age: 58
End: 2018-11-12
Payer: COMMERCIAL

## 2018-11-12 VITALS
HEIGHT: 70 IN | DIASTOLIC BLOOD PRESSURE: 80 MMHG | RESPIRATION RATE: 14 BRPM | TEMPERATURE: 98.8 F | HEART RATE: 94 BPM | OXYGEN SATURATION: 95 % | SYSTOLIC BLOOD PRESSURE: 120 MMHG | WEIGHT: 220 LBS | BODY MASS INDEX: 31.5 KG/M2

## 2018-11-12 DIAGNOSIS — F32.5 MAJOR DEPRESSIVE DISORDER WITH SINGLE EPISODE, IN FULL REMISSION (HCC): ICD-10-CM

## 2018-11-12 DIAGNOSIS — E11.9 TYPE 2 DIABETES MELLITUS WITHOUT COMPLICATION, WITHOUT LONG-TERM CURRENT USE OF INSULIN (HCC): ICD-10-CM

## 2018-11-12 PROCEDURE — 99213 OFFICE O/P EST LOW 20 MIN: CPT | Performed by: FAMILY MEDICINE

## 2018-11-12 NOTE — LETTER
November 12, 2018        Miguel Angel Rutherford Pradeep  [address]  [place] [address]        To Whom It May Concern:    My patient listed above has a pet in his home, specifically his cat who provides him with a sense of security and helps with his depression and anxiety.  Please allow him to continue to have this pet in the home with him.    If you have any questions or concerns, please don't hesitate to call.        Sincerely,        Anastasia Bravo M.D.    Electronically Signed

## 2018-11-12 NOTE — ASSESSMENT & PLAN NOTE
This is a chronic health problem for the patient but he needs an A1c with his next set of blood work.  Patient tells me that he has been eating too much candy and knows he needs to cut back.  He is cutting back on his soda which should help his blood sugar to come down.

## 2018-11-12 NOTE — PROGRESS NOTES
CC:Diagnoses of Major depressive disorder with single episode, in full remission ([facility]) and Type 2 diabetes mellitus without complication, without long-term current use of insulin ([facility]) were pertinent to this visit.    HISTORY OF PRESENT ILLNESS: Patient is a 58 y.o. male established patient who presents today to discuss his depression and request a refill of his Celexa.  Patient in the past had been taking Celexa at 20 mg then it started cutting down and for several years had been at a 10 mg dose.  He was hopeful he would be able to stop taking the medication but unfortunately he developed recurrent depression.  He is now back on the Celexa at 10 and still having some sad thoughts as well as feelings of helplessness and hopelessness.  I recommended to him that he go back to taking a full 20 mg dose and he is going to do that.  He also needs a letter for his apartment to allow him to keep his cat which he utilizes as a comfort/support animal.  Patient was to have done blood work prior to this visit but already has an appointment set for 12/5/18 and he will do it prior to that appointment.    Health Maintenance: Completed    Major depressive disorder with single episode, in full remission (HCC)  This is a chronic health problem for this patient for which he was weaning himself off of Celexa and when we first met him he was down to just 5 mg daily.  He found that as he weaned himself down that he did have some sad thoughts return and feelings of helplessness and hopelessness.  Were going to put him back on the Celexa at 20 mg daily and have him continue with this dosing long-term.  He also has a cat that he lives with who helps to calm him and make him feel secure.  We will write a letter to that effect.    Type 2 diabetes mellitus without complication, without long-term current use of insulin ([facility])  This is a chronic health problem for the patient but he needs an A1c with his next set of blood work.  Patient tells  me that he has been eating too much candy and knows he needs to cut back.  He is cutting back on his soda which should help his blood sugar to come down.      Patient Active Problem List    Diagnosis Date Noted   • Incomplete rotator cuff tear or rupture of right shoulder, not specified as traumatic 11/01/2018   • Acute post-operative pain 11/01/2018   • Obesity (BMI 30-39.9) 10/08/2018   • Incomplete tear of right rotator cuff 10/08/2018   • History of tobacco abuse 03/21/2018   • Rodriguez's esophagus without dysplasia 01/31/2018   • Cervical spondylosis 01/18/2018   • Chronic migraine 05/11/2017   • BMI 31.0-31.9,adult 03/07/2017   • Rosacea 01/26/2016   • Type 2 diabetes mellitus without complication, without long-term current use of insulin ([facility]) 01/26/2016   • Frequent headaches 01/26/2016   • Major depressive disorder with single episode, in full remission ([facility]) 11/22/2010   • Hypogonadism male 11/22/2010   • ED (erectile dysfunction) 11/22/2010   • FRANK on CPAP 11/22/2010   • HTN (hypertension) 11/22/2010   • Acid reflux 11/22/2010      Allergies:Patient has no known allergies.    Current Outpatient Prescriptions   Medication Sig Dispense Refill   • citalopram (CELEXA) 20 MG Tab Take 1 Tab by mouth every day. 90 Tab 3   • Ascorbic Acid (VITAMIN C) 1000 MG Tab Take 1 Tab by mouth every day.     • magnesium oxide (MAG-OX) 400 MG Tab Take 400 mg by mouth every day.     • loratadine (CLARITIN) 10 MG Tab Take 10 mg by mouth every day.     • Probiotic Product (PROBIOTIC-10 PO) Take 2 Caps by mouth every day.     • Cholecalciferol (VITAMIN D3) 5000 units Cap Take 1 Cap by mouth every day.     • Cyanocobalamin (VITAMIN B-12) 1000 MCG Tab Take 1,000 mcg by mouth every day.     • Glucosamine HCl 1500 MG Tab Take 1 Tab by mouth every day.     • topiramate (TOPAMAX) 50 MG tablet Take 1 Tab by mouth every bedtime. 90 Tab 3   • triamcinolone acetonide (KENALOG) 0.1 % Cream A small amount applied twice daily until clear 45 g  3   • losartan (COZAAR) 100 MG Tab TAKE 1 TABLET EVERY DAY 90 Tab 3   • omeprazole (PRILOSEC) 20 MG delayed-release capsule TAKE 1 CAPSULE DAILY 90 Cap 3   • sildenafil citrate (VIAGRA) 50 MG tablet Take 1 Tab by mouth as needed for Erectile Dysfunction. 10 Tab 3   • sumatriptan (IMITREX) 100 MG tablet TAKE 1/2 TO 1 TABLET  AT ONSET OF HEADACHE - CAN REPEAT 1 TIME IN 24 HOURS IF HEADACHE PERSISTS 2 HOURS AFTER THE FIRST DOSE 36 Tab 3   • metFORMIN (GLUCOPHAGE) 500 MG Tab Take 1 Tab by mouth 2 times a day, with meals. 180 Tab 3   • Aspirin-Acetaminophen-Caffeine (EXCEDRIN MIGRAINE PO) Take 1 Tab by mouth as needed.       No current facility-administered medications for this visit.        Social History   Substance Use Topics   • Smoking status: Former Smoker     Packs/day: 1.00     Years: 5.00     Types: Cigarettes     Quit date: 1/1/1982   • Smokeless tobacco: Never Used      Comment: quit 35 years ago   • Alcohol use 0.0 oz/week      Comment: approx 2 every 3 months.     Social History     Social History Narrative    Patient is  with an adult son. He works full time as a        Family History   Problem Relation Age of Onset   • Diabetes Father    • Heart Attack Father 60   • Cancer Neg Hx    • Suicide Attempts Neg Hx         ROS:     - Constitutional:  Negative for fever, chills, unexpected weight change, and fatigue/generalized weakness.    - HEENT:  Negative for headaches, vision changes, hearing changes, ear pain, ear discharge, rhinorrhea, sinus congestion, sore throat, and neck pain.      - Respiratory: Negative for cough, sputum production, chest congestion, dyspnea, wheezing, and crackles.      - Cardiovascular: Negative for chest pain, palpitations, orthopnea, and bilateral lower extremity edema.     - Gastrointestinal: Negative for heartburn, nausea, vomiting, abdominal pain, hematochezia, melena, diarrhea, constipation, and greasy/foul-smelling stools.     - Genitourinary:  "Negative for dysuria, polyuria, hematuria, pyuria, urinary urgency, and urinary incontinence.       - Psychiatric/Behavioral: Positive for depression but denies suicidal or homicidal ideation.     Exam:    Blood pressure 120/80, pulse 94, temperature 37.1 °C (98.8 °F), temperature source Temporal, resp. rate 14, height 1.778 m (5' 10\"), weight 99.8 kg (220 lb), SpO2 95 %. Body mass index is 31.57 kg/m².    General:  Well nourished, well developed male in NAD  Head is grossly normal.  Neck: Supple without JVD or bruit. Thyroid is not enlarged.  Pulmonary: Clear to ausculation and percussion.  Normal effort. No rales, ronchi, or wheezing.  Cardiovascular: Regular rate and rhythm without murmur. Carotid and radial pulses are intact and equal bilaterally.  Extremities: no clubbing, cyanosis, or edema.  Patient was seen for 20 minutes face to face of which, 15 minutes was spent counseling regarding medications interactions and side effects.  Also discussion of why I felt he wanted and should go back up to a full dose of his antidepressant.  Also gave him the note he needed for his cat and his apartment complex..    Please note that this dictation was created using voice recognition software. I have made every reasonable attempt to correct obvious errors, but I expect that there are errors of grammar and possibly content that I did not discover before finalizing the note.    Assessment/Plan:  1. Major depressive disorder with single episode, in full remission (HCC)  Uncontrolled, I definitely want this patient to go back on Celexa at 20 mg daily.  We will see him back on 12/5/18 to go over his blood work and make certain he is doing well.    2. Type 2 diabetes mellitus without complication, without long-term current use of insulin (HCC)  Patient does need to get a hemoglobin A1c in addition to the other labs that he is going to draw.  That order was done today.  - HEMOGLOBIN A1C; Future         "

## 2018-11-12 NOTE — ASSESSMENT & PLAN NOTE
This is a chronic health problem for this patient for which he was weaning himself off of Celexa and when we first met him he was down to just 5 mg daily.  He found that as he weaned himself down that he did have some sad thoughts return and feelings of helplessness and hopelessness.  Were going to put him back on the Celexa at 20 mg daily and have him continue with this dosing long-term.  He also has a cat that he lives with who helps to calm him and make him feel secure.  We will write a letter to that effect.

## 2018-11-13 RX ORDER — SUMATRIPTAN 100 MG/1
TABLET, FILM COATED ORAL
Qty: 36 TAB | Refills: 3 | Status: SHIPPED | OUTPATIENT
Start: 2018-11-13 | End: 2019-01-04 | Stop reason: SDUPTHER

## 2018-11-28 ENCOUNTER — TELEPHONE (OUTPATIENT)
Dept: MEDICAL GROUP | Facility: MEDICAL CENTER | Age: 58
End: 2018-11-28

## 2018-11-28 NOTE — TELEPHONE ENCOUNTER
ESTABLISHED PATIENT PRE-VISIT PLANNING     Patient was contacted to complete PVP.     Note: Patient will not be contacted if there is no indication to call.     1.  Reviewed notes from the last few office visits within the medical group: Yes  11/12/2018  2.  If any orders were placed at last visit or intended to be done for this visit (i.e. 6 mos follow-up), do we have Results/Consult Notes?        •  Labs - Labs ordered, NOT completed. Patient advised to complete prior to next appointment.   Note: If patient appointment is for lab review and patient did not complete labs, check with provider if OK to reschedule patient until labs completed.       •  Imaging - Imaging was not ordered at last office visit.       •  Referrals - No referrals were ordered at last office visit.    3. Is this appointment scheduled as a Hospital Follow-Up? No    4.  Immunizations were updated in [employer] using [employer]?: Yes       •  [employer] Recommendations: ZOSTAVAX (Shingles)    5.  Patient is due for the following Health Maintenance Topics:   Health Maintenance Due   Topic Date Due   • IMM ZOSTER VACCINES (1 of 2) 03/13/2008   • RETINAL SCREENING  03/08/2018   • FASTING LIPID PROFILE  11/25/2018   • URINE ACR / MICROALBUMIN  11/25/2018         6.  MDX printed for Provider? NO    7.  Patient was informed to arrive 15 min prior to their scheduled appointment and bring in their medication bottles.    -Patient reminded to comp labs. Please have pt sign medical release form for retina in letters.

## 2018-11-28 NOTE — LETTER
[facility]  Anastasia Bravo M.D.  [address]  [place] [address]  Fax: [phone]   Authorization for Release/Disclosure of   Protected Health Information   Name: VERONICA DAVIS : 1960 SSN: [id number]   Address: [address] [place] [address] [address]  [place] [address] Phone:    [phone] (home)    I authorize the entity listed below to release/disclose the PHI below to:   [facility]/Anastasia Bravo M.D. and Anastasia Bravo M.D.   Provider or Entity Name:                                               / Cain Anderson    Address   City, [facility], [facility]   Phone:      Fax:     Reason for request: continuity of care   Information to be released:    [  ] LAST COLONOSCOPY,  including any PATH REPORT and follow-up  [  ] LAST FIT/COLOGUARD RESULT [  ] LAST DEXA  [  ] LAST MAMMOGRAM  [  ] LAST PAP  [  ] LAST LABS [ X ] RETINA EXAM REPORT  [  ] IMMUNIZATION RECORDS  [  ] Release all info      [  ] Check here and initial the line next to each item to release ALL health information INCLUDING  _____ Care and treatment for drug and / or alcohol abuse  _____ HIV testing, infection status, or AIDS  _____ Genetic Testing    DATES OF SERVICE OR TIME PERIOD TO BE DISCLOSED: _____________  I understand and acknowledge that:  * This Authorization may be revoked at any time by you in writing, except if your health information has already been used or disclosed.  * Your health information that will be used or disclosed as a result of you signing this authorization could be re-disclosed by the recipient. If this occurs, your re-disclosed health information may no longer be protected by State or Federal laws.  * You may refuse to sign this Authorization. Your refusal will not affect your ability to obtain treatment.  * This Authorization becomes effective upon signing and will  on (date) __________.      If no date is indicated, this Authorization will  one (1) year from the signature date.      Name: Miguel Angel Fernández    Signature:   Date:     11/28/2018       PLEASE FAX REQUESTED RECORDS BACK TO: [phone]

## 2018-11-30 ENCOUNTER — HOSPITAL ENCOUNTER (OUTPATIENT)
Dept: LAB | Facility: MEDICAL CENTER | Age: 58
End: 2018-11-30
Attending: FAMILY MEDICINE
Payer: COMMERCIAL

## 2018-11-30 DIAGNOSIS — E11.9 TYPE 2 DIABETES MELLITUS WITHOUT COMPLICATION, WITHOUT LONG-TERM CURRENT USE OF INSULIN (HCC): ICD-10-CM

## 2018-11-30 LAB
ALBUMIN SERPL BCP-MCNC: 4 G/DL (ref 3.2–4.9)
ALBUMIN/GLOB SERPL: 1.4 G/DL
ALP SERPL-CCNC: 105 U/L (ref 30–99)
ALT SERPL-CCNC: 51 U/L (ref 2–50)
ANION GAP SERPL CALC-SCNC: 4 MMOL/L (ref 0–11.9)
AST SERPL-CCNC: 33 U/L (ref 12–45)
BASOPHILS # BLD AUTO: 1.2 % (ref 0–1.8)
BASOPHILS # BLD: 0.08 K/UL (ref 0–0.12)
BILIRUB SERPL-MCNC: 0.3 MG/DL (ref 0.1–1.5)
BUN SERPL-MCNC: 19 MG/DL (ref 8–22)
CALCIUM SERPL-MCNC: 9.5 MG/DL (ref 8.5–10.5)
CHLORIDE SERPL-SCNC: 105 MMOL/L (ref 96–112)
CHOLEST SERPL-MCNC: 129 MG/DL (ref 100–199)
CO2 SERPL-SCNC: 29 MMOL/L (ref 20–33)
CREAT SERPL-MCNC: 0.89 MG/DL (ref 0.5–1.4)
EOSINOPHIL # BLD AUTO: 0.48 K/UL (ref 0–0.51)
EOSINOPHIL NFR BLD: 7 % (ref 0–6.9)
ERYTHROCYTE [DISTWIDTH] IN BLOOD BY AUTOMATED COUNT: 43.4 FL (ref 35.9–50)
EST. AVERAGE GLUCOSE BLD GHB EST-MCNC: 140 MG/DL
FASTING STATUS PATIENT QL REPORTED: NORMAL
GLOBULIN SER CALC-MCNC: 2.9 G/DL (ref 1.9–3.5)
GLUCOSE SERPL-MCNC: 112 MG/DL (ref 65–99)
HBA1C MFR BLD: 6.5 % (ref 0–5.6)
HCT VFR BLD AUTO: 45.8 % (ref 42–52)
HDLC SERPL-MCNC: 32 MG/DL
HGB BLD-MCNC: 15.1 G/DL (ref 14–18)
IMM GRANULOCYTES # BLD AUTO: 0.01 K/UL (ref 0–0.11)
IMM GRANULOCYTES NFR BLD AUTO: 0.1 % (ref 0–0.9)
LDLC SERPL CALC-MCNC: 74 MG/DL
LYMPHOCYTES # BLD AUTO: 3.61 K/UL (ref 1–4.8)
LYMPHOCYTES NFR BLD: 52.5 % (ref 22–41)
MCH RBC QN AUTO: 30.3 PG (ref 27–33)
MCHC RBC AUTO-ENTMCNC: 33 G/DL (ref 33.7–35.3)
MCV RBC AUTO: 92 FL (ref 81.4–97.8)
MONOCYTES # BLD AUTO: 0.73 K/UL (ref 0–0.85)
MONOCYTES NFR BLD AUTO: 10.6 % (ref 0–13.4)
NEUTROPHILS # BLD AUTO: 1.96 K/UL (ref 1.82–7.42)
NEUTROPHILS NFR BLD: 28.6 % (ref 44–72)
NRBC # BLD AUTO: 0 K/UL
NRBC BLD-RTO: 0 /100 WBC
PLATELET # BLD AUTO: 198 K/UL (ref 164–446)
PMV BLD AUTO: 12.6 FL (ref 9–12.9)
POTASSIUM SERPL-SCNC: 4.2 MMOL/L (ref 3.6–5.5)
PROT SERPL-MCNC: 6.9 G/DL (ref 6–8.2)
RBC # BLD AUTO: 4.98 M/UL (ref 4.7–6.1)
SODIUM SERPL-SCNC: 138 MMOL/L (ref 135–145)
TRIGL SERPL-MCNC: 113 MG/DL (ref 0–149)
TSH SERPL DL<=0.005 MIU/L-ACNC: 1.83 UIU/ML (ref 0.38–5.33)
WBC # BLD AUTO: 6.9 K/UL (ref 4.8–10.8)

## 2018-11-30 PROCEDURE — 80061 LIPID PANEL: CPT

## 2018-11-30 PROCEDURE — 83036 HEMOGLOBIN GLYCOSYLATED A1C: CPT

## 2018-11-30 PROCEDURE — 84443 ASSAY THYROID STIM HORMONE: CPT

## 2018-11-30 PROCEDURE — 80053 COMPREHEN METABOLIC PANEL: CPT

## 2018-11-30 PROCEDURE — 36415 COLL VENOUS BLD VENIPUNCTURE: CPT

## 2018-11-30 PROCEDURE — 85025 COMPLETE CBC W/AUTO DIFF WBC: CPT

## 2018-12-05 ENCOUNTER — OFFICE VISIT (OUTPATIENT)
Dept: MEDICAL GROUP | Facility: MEDICAL CENTER | Age: 58
End: 2018-12-05
Payer: COMMERCIAL

## 2018-12-05 VITALS
SYSTOLIC BLOOD PRESSURE: 120 MMHG | HEIGHT: 70 IN | HEART RATE: 82 BPM | DIASTOLIC BLOOD PRESSURE: 88 MMHG | OXYGEN SATURATION: 94 % | BODY MASS INDEX: 31.5 KG/M2 | RESPIRATION RATE: 14 BRPM | WEIGHT: 220 LBS | TEMPERATURE: 97.3 F

## 2018-12-05 DIAGNOSIS — R74.8 ELEVATED ALKALINE PHOSPHATASE LEVEL: ICD-10-CM

## 2018-12-05 DIAGNOSIS — L20.84 INTRINSIC ECZEMA: ICD-10-CM

## 2018-12-05 DIAGNOSIS — F32.5 MAJOR DEPRESSIVE DISORDER WITH SINGLE EPISODE, IN FULL REMISSION (HCC): ICD-10-CM

## 2018-12-05 DIAGNOSIS — I10 ESSENTIAL HYPERTENSION: ICD-10-CM

## 2018-12-05 DIAGNOSIS — E11.9 TYPE 2 DIABETES MELLITUS WITHOUT COMPLICATION, WITHOUT LONG-TERM CURRENT USE OF INSULIN (HCC): ICD-10-CM

## 2018-12-05 PROBLEM — G89.18 ACUTE POST-OPERATIVE PAIN: Status: RESOLVED | Noted: 2018-11-01 | Resolved: 2018-12-05

## 2018-12-05 PROBLEM — M75.111 INCOMPLETE ROTATOR CUFF TEAR OR RUPTURE OF RIGHT SHOULDER, NOT SPECIFIED AS TRAUMATIC: Status: RESOLVED | Noted: 2018-11-01 | Resolved: 2018-12-05

## 2018-12-05 PROBLEM — M75.111 INCOMPLETE TEAR OF RIGHT ROTATOR CUFF: Status: RESOLVED | Noted: 2018-10-08 | Resolved: 2018-12-05

## 2018-12-05 PROCEDURE — 99214 OFFICE O/P EST MOD 30 MIN: CPT | Performed by: FAMILY MEDICINE

## 2018-12-05 RX ORDER — PAROXETINE HYDROCHLORIDE 20 MG/1
20 TABLET, FILM COATED ORAL DAILY
Qty: 30 TAB | Refills: 11 | Status: SHIPPED | OUTPATIENT
Start: 2018-12-05 | End: 2018-12-05

## 2018-12-05 RX ORDER — PAROXETINE HYDROCHLORIDE 20 MG/1
20 TABLET, FILM COATED ORAL DAILY
Qty: 30 TAB | Refills: 11 | Status: SHIPPED | OUTPATIENT
Start: 2018-12-05 | End: 2022-09-07

## 2018-12-06 ENCOUNTER — HOSPITAL ENCOUNTER (OUTPATIENT)
Dept: LAB | Facility: MEDICAL CENTER | Age: 58
End: 2018-12-06
Attending: FAMILY MEDICINE
Payer: COMMERCIAL

## 2018-12-06 DIAGNOSIS — R74.8 ELEVATED ALKALINE PHOSPHATASE LEVEL: ICD-10-CM

## 2018-12-06 LAB — ERYTHROCYTE [SEDIMENTATION RATE] IN BLOOD BY WESTERGREN METHOD: 13 MM/HOUR (ref 0–20)

## 2018-12-06 PROCEDURE — 84080 ASSAY ALKALINE PHOSPHATASES: CPT

## 2018-12-06 PROCEDURE — 36415 COLL VENOUS BLD VENIPUNCTURE: CPT

## 2018-12-06 PROCEDURE — 85652 RBC SED RATE AUTOMATED: CPT

## 2018-12-06 PROCEDURE — 84075 ASSAY ALKALINE PHOSPHATASE: CPT

## 2018-12-06 NOTE — ASSESSMENT & PLAN NOTE
This is a chronic health problem that is uncontrolled with current medications and lifestyle measures.  Patient has been on Celexa and we were going to increase his dose but he realized that he has had more headaches since starting on Celexa.  Previously he was on Paxil and found that that worked well to control his depression and he did not have the headaches.  Were going to try switching directly back over.  We will utilize that medication see him back in 4 weeks to be certain that he is doing better.

## 2018-12-06 NOTE — ASSESSMENT & PLAN NOTE
This is a chronic health problem that is uncontrolled with current medications and lifestyle measures.  Patient has been utilizing triamcinolone cream but possibly has been overusing it and then adding hydrogen peroxide which I believe has burned his lesions on his left arm.  He is going to go back to just using the triamcinolone cream and no hydrogen peroxide.  I also hope that once we get his depression and anxiety under better control this will also improve as well.

## 2018-12-06 NOTE — PROGRESS NOTES
CC:Diagnoses of Type 2 diabetes mellitus without complication, without long-term current use of insulin ([facility]), Essential hypertension, Elevated alkaline phosphatase level, Chronic migraine, Intrinsic eczema, and Major depressive disorder with single episode, in full remission ([facility]) were pertinent to this visit.    HISTORY OF PRESENT ILLNESS: Patient is a 58 y.o. male established patient who presents today to follow-up on chronic health problems as outlined below.  Patient did his blood work prior to the visit would like to discuss those results.    Health Maintenance: Completed    Type 2 diabetes mellitus without complication, without long-term current use of insulin (CMS-HCC)  This is a chronic health problem that is well controlled with current medications and lifestyle measures.  His fasting glucose is good at 112 and his A1c is 6.5.    HTN (hypertension)  This is a chronic health problem that is well controlled with current medications and lifestyle measures.  His blood pressure is excellent today 120/88.The patient denies chest pain, shortness of breath or dyspnea on exertion.      Elevated alkaline phosphatase level  This is a new health problem for this patient found on blood work.  We had him do a comprehensive metabolic panel that came back showing his SGPT is slightly elevated at 51 and his alk phos is elevated at 105.  I am going to have him go ahead and do isoenzymes so we can see if the alkaline phosphatase is related to joint pain versus liver problems.  Patient does have a lot of joint pain.  He is to have his left hip replaced and is currently living with right hip pain he is also had his right shoulder worked on and and they did arthroscopic surgery and cleaned it up for him but that may be why his alk phos was elevated on 11/30/18.  We will get the isoenzymes and then plan to see him back to determine next steps.    Chronic migraine  This is a chronic health problem that is uncontrolled with  current medications and lifestyle measures.  Patient is finding that he still having times of headaches and does not feel that he has adequate control of them.  He ended up stopping the Topamax 50 mg at bedtime because it was making him tired during the day.  He is off work until the first of the year I would like for him to get back on this medication and see if we can calm down his migraines.  I suspect we will be able to do that once we have them calm down, we can then lower the dose of Topamax to 25 mg at bedtime so we will not have daytime sedation.    Intrinsic eczema  This is a chronic health problem that is uncontrolled with current medications and lifestyle measures.  Patient has been utilizing triamcinolone cream but possibly has been overusing it and then adding hydrogen peroxide which I believe has burned his lesions on his left arm.  He is going to go back to just using the triamcinolone cream and no hydrogen peroxide.  I also hope that once we get his depression and anxiety under better control this will also improve as well.    Major depressive disorder with single episode, in full remission (HCC)  This is a chronic health problem that is uncontrolled with current medications and lifestyle measures.  Patient has been on Celexa and we were going to increase his dose but he realized that he has had more headaches since starting on Celexa.  Previously he was on Paxil and found that that worked well to control his depression and he did not have the headaches.  Were going to try switching directly back over.  We will utilize that medication see him back in 4 weeks to be certain that he is doing better.      Patient Active Problem List    Diagnosis Date Noted   • Elevated alkaline phosphatase level 12/05/2018   • Intrinsic eczema 12/05/2018   • Obesity (BMI 30-39.9) 10/08/2018   • History of tobacco abuse 03/21/2018   • Rodriguez's esophagus without dysplasia 01/31/2018   • Cervical spondylosis 01/18/2018   •  Chronic migraine 05/11/2017   • BMI 31.0-31.9,adult 03/07/2017   • Rosacea 01/26/2016   • Type 2 diabetes mellitus without complication, without long-term current use of insulin ([facility]) 01/26/2016   • Frequent headaches 01/26/2016   • Major depressive disorder with single episode, in full remission ([facility]) 11/22/2010   • Hypogonadism male 11/22/2010   • ED (erectile dysfunction) 11/22/2010   • FRANK on CPAP 11/22/2010   • HTN (hypertension) 11/22/2010   • Acid reflux 11/22/2010      Allergies:Patient has no known allergies.    Current Outpatient Prescriptions   Medication Sig Dispense Refill   • PARoxetine (PAXIL) 20 MG Tab Take 1 Tab by mouth every day. 30 Tab 11   • sumatriptan (IMITREX) 100 MG tablet Take 1/2-1 tab at onset of migraine, can repeat one time in 24 hours if headache persists 2 hours after first dose. 36 Tab 3   • Ascorbic Acid (VITAMIN C) 1000 MG Tab Take 1 Tab by mouth every day.     • magnesium oxide (MAG-OX) 400 MG Tab Take 400 mg by mouth every day.     • loratadine (CLARITIN) 10 MG Tab Take 10 mg by mouth every day.     • Probiotic Product (PROBIOTIC-10 PO) Take 2 Caps by mouth every day.     • Cholecalciferol (VITAMIN D3) 5000 units Cap Take 1 Cap by mouth every day.     • Cyanocobalamin (VITAMIN B-12) 1000 MCG Tab Take 1,000 mcg by mouth every day.     • Glucosamine HCl 1500 MG Tab Take 1 Tab by mouth every day.     • topiramate (TOPAMAX) 50 MG tablet Take 1 Tab by mouth every bedtime. 90 Tab 3   • triamcinolone acetonide (KENALOG) 0.1 % Cream A small amount applied twice daily until clear 45 g 3   • losartan (COZAAR) 100 MG Tab TAKE 1 TABLET EVERY DAY 90 Tab 3   • omeprazole (PRILOSEC) 20 MG delayed-release capsule TAKE 1 CAPSULE DAILY 90 Cap 3   • sildenafil citrate (VIAGRA) 50 MG tablet Take 1 Tab by mouth as needed for Erectile Dysfunction. 10 Tab 3   • metFORMIN (GLUCOPHAGE) 500 MG Tab Take 1 Tab by mouth 2 times a day, with meals. 180 Tab 3   • Aspirin-Acetaminophen-Caffeine (EXCEDRIN  MIGRAINE PO) Take 1 Tab by mouth as needed.       No current facility-administered medications for this visit.        Social History   Substance Use Topics   • Smoking status: Former Smoker     Packs/day: 1.00     Years: 5.00     Types: Cigarettes     Quit date: 1/1/1982   • Smokeless tobacco: Never Used      Comment: quit 35 years ago   • Alcohol use 0.0 oz/week      Comment: approx 2 every 3 months.     Social History     Social History Narrative    Patient is  with an adult son. He works full time as a        Family History   Problem Relation Age of Onset   • Diabetes Father    • Heart Attack Father 60   • Cancer Neg Hx    • Suicide Attempts Neg Hx         ROS:     - Constitutional:  Negative for fever, chills, unexpected weight change, and fatigue/generalized weakness.    - HEENT:  Negative for headaches, vision changes, hearing changes, ear pain, ear discharge, rhinorrhea, sinus congestion, sore throat, and neck pain.      - Respiratory: Negative for cough, sputum production, chest congestion, dyspnea, wheezing, and crackles.      - Cardiovascular: Negative for chest pain, palpitations, orthopnea, and bilateral lower extremity edema.     - Gastrointestinal: Negative for heartburn, nausea, vomiting, abdominal pain, hematochezia, melena, diarrhea, constipation, and greasy/foul-smelling stools.     - Genitourinary: Negative for dysuria, polyuria, hematuria, pyuria, urinary urgency, and urinary incontinence.     - Musculoskeletal: Negative for myalgias, back pain, and joint pain.     - Skin: Positive for skin rash mainly affecting the left arm, appears to be neurogenic.      - Neurological: Positive for headaches the patient calls migraines, has not been taking medications as prescribed.  Denies dizziness, tingling, tremors, focal sensory deficit, focal weakness.     - Endo/Heme/Allergies: Does not bruise/bleed easily.     - Psychiatric/Behavioral: Negative for depression,  "suicidal/homicidal ideation and memory loss.              Exam:    Blood pressure 120/88, pulse 82, temperature 36.3 °C (97.3 °F), temperature source Temporal, resp. rate 14, height 1.778 m (5' 10\"), weight 99.8 kg (220 lb), SpO2 94 %. Body mass index is 31.57 kg/m².    General:  Well nourished, well developed male in NAD  Head is grossly normal.  Neck: Supple without JVD or bruit. Thyroid is not enlarged.  Pulmonary: Clear to ausculation and percussion.  Normal effort. No rales, ronchi, or wheezing.  Cardiovascular: Regular rate and rhythm without murmur. Carotid and radial pulses are intact and equal bilaterally.  Extremities: no clubbing, cyanosis, or edema.  LABS: 11/30/18: Results reviewed and discussed with the patient, questions answered.    Please note that this dictation was created using voice recognition software. I have made every reasonable attempt to correct obvious errors, but I expect that there are errors of grammar and possibly content that I did not discover before finalizing the note.    Assessment/Plan:  1. Type 2 diabetes mellitus without complication, without long-term current use of insulin (HCC)  Well-controlled with current medications.  Patient will continue with this long-term.  We will plan to recheck his diabetes and lipids in approximately 3-4 months down the road.    2. Essential hypertension  Controlled, continue with current meds and lifestyle.      3. Elevated alkaline phosphatase level  Uncontrolled, patient will get alkaline phosphatase isoenzymes a week and trying to determine whether or not his elevation is from liver or bone.  Patient does have joint pain which I believe is osteoarthritis.  He is already had his left hip replaced and is now having right hip pain also had a arthroscopic surgery on his right shoulder to clean up bone spurs.  - ALKALINE PHOSPHATASE ISOENZYMES; Future    4. Chronic migraine  Uncontrolled, patient is taking an excessive amount of sumatriptan.  I " believe he may be having rebound headaches.  I would like him to get back on his topiramate.  Right now he is off from work so the fact that the 50 mg dose makes him somewhat fatigued, would be okay.  If it takes away his headaches we could then lower his dose.  I will check a sed rate the patient has no symptoms of temporal arteritis.  - [facility] SED RATE; Future    5. Intrinsic eczema  Uncontrolled, this patient has intrinsic eczema that he is then having problems with picking at his lesions.  He has been traumatizing the skin with multiple over-the-counter medications.  I have cautioned him against doing this.  I have asked him to use the medications that have been provided.  He will use these appropriately.    6. Major depressive disorder with single episode, in full remission (HCC)  Controlled with current dose but patient tells me he believes he started having his headaches when he was placed on Celexa.  That could be a side effect.  We will go back to Paxil which she is used in the past with good results.  We will see the patient back in 4 weeks to make certain that all of this is working well for him.  This will be before he returns to the holidays.

## 2018-12-06 NOTE — ASSESSMENT & PLAN NOTE
This is a new health problem for this patient found on blood work.  We had him do a comprehensive metabolic panel that came back showing his SGPT is slightly elevated at 51 and his alk phos is elevated at 105.  I am going to have him go ahead and do isoenzymes so we can see if the alkaline phosphatase is related to joint pain versus liver problems.  Patient does have a lot of joint pain.  He is to have his left hip replaced and is currently living with right hip pain he is also had his right shoulder worked on and and they did arthroscopic surgery and cleaned it up for him but that may be why his alk phos was elevated on 11/30/18.  We will get the isoenzymes and then plan to see him back to determine next steps.

## 2018-12-06 NOTE — ASSESSMENT & PLAN NOTE
This is a chronic health problem that is uncontrolled with current medications and lifestyle measures.  Patient is finding that he still having times of headaches and does not feel that he has adequate control of them.  He ended up stopping the Topamax 50 mg at bedtime because it was making him tired during the day.  He is off work until the first of the year I would like for him to get back on this medication and see if we can calm down his migraines.  I suspect we will be able to do that once we have them calm down, we can then lower the dose of Topamax to 25 mg at bedtime so we will not have daytime sedation.

## 2018-12-06 NOTE — ASSESSMENT & PLAN NOTE
This is a chronic health problem that is well controlled with current medications and lifestyle measures.  His fasting glucose is good at 112 and his A1c is 6.5.

## 2018-12-06 NOTE — ASSESSMENT & PLAN NOTE
This is a chronic health problem that is well controlled with current medications and lifestyle measures.  His blood pressure is excellent today 120/88.The patient denies chest pain, shortness of breath or dyspnea on exertion.

## 2018-12-11 LAB
ALP BONE SERPL-CCNC: 39 U/L (ref 0–55)
ALP ISOS SERPL HS-CCNC: 0 U/L
ALP LIVER SERPL-CCNC: 77 U/L (ref 0–94)
ALP SERPL-CCNC: 116 U/L (ref 40–120)

## 2019-01-04 ENCOUNTER — OFFICE VISIT (OUTPATIENT)
Dept: MEDICAL GROUP | Facility: MEDICAL CENTER | Age: 59
End: 2019-01-04
Payer: COMMERCIAL

## 2019-01-04 VITALS
BODY MASS INDEX: 31.15 KG/M2 | SYSTOLIC BLOOD PRESSURE: 120 MMHG | HEART RATE: 72 BPM | WEIGHT: 217.59 LBS | RESPIRATION RATE: 16 BRPM | OXYGEN SATURATION: 98 % | TEMPERATURE: 98.2 F | HEIGHT: 70 IN | DIASTOLIC BLOOD PRESSURE: 90 MMHG

## 2019-01-04 DIAGNOSIS — R51.9 FREQUENT HEADACHES: ICD-10-CM

## 2019-01-04 DIAGNOSIS — L20.84 INTRINSIC ECZEMA: ICD-10-CM

## 2019-01-04 DIAGNOSIS — R74.8 ELEVATED ALKALINE PHOSPHATASE LEVEL: ICD-10-CM

## 2019-01-04 PROCEDURE — 99214 OFFICE O/P EST MOD 30 MIN: CPT | Performed by: FAMILY MEDICINE

## 2019-01-04 RX ORDER — SUMATRIPTAN 100 MG/1
TABLET, FILM COATED ORAL
Qty: 36 TAB | Refills: 3 | Status: SHIPPED | OUTPATIENT
Start: 2019-01-04 | End: 2019-06-04 | Stop reason: SDUPTHER

## 2019-01-04 RX ORDER — TOPIRAMATE 100 MG/1
100 TABLET, FILM COATED ORAL
Qty: 90 TAB | Refills: 3 | Status: SHIPPED | OUTPATIENT
Start: 2019-01-04 | End: 2019-02-28

## 2019-01-04 NOTE — ASSESSMENT & PLAN NOTE
This was a concern because the patient had blood work that showed that his alkaline phosphatase was elevated.  On his repeat study his alkaline phosphatase is down in the normal range at 116 and his liver fraction and bone fraction are both normal.  His sed rate is down to 13 which would point against this being any type of an autoimmune problem.

## 2019-01-04 NOTE — ASSESSMENT & PLAN NOTE
This is a chronic health problem for this patient that he is been diligently using triamcinolone cream without relief.  He continues to have multiple lesions on his left arm as well as his lower extremities.  He even had a lesion show up in the scalp over those last month.  He is experiencing a great deal of pruritus.  I would like for him to go back to dermatology.  He was seen by the skin [facility] here in [place].  We will renew that referral.

## 2019-01-05 NOTE — PROGRESS NOTES
CC:Diagnoses of Chronic migraine, Intrinsic eczema, and Elevated alkaline phosphatase level were pertinent to this visit.    HISTORY OF PRESENT ILLNESS: Patient is a 58 y.o. male established patient who presents today to ***    Health Maintenance: {COMPLETED:093484}    Intrinsic eczema  This is a chronic health problem for this patient that he is been diligently using triamcinolone cream without relief.  He continues to have multiple lesions on his left arm as well as his lower extremities.  He even had a lesion show up in the scalp over those last month.  He is experiencing a great deal of pruritus.  I would like for him to go back to dermatology.  He was seen by the skin [facility] here in [place].  We will renew that referral.    Elevated alkaline phosphatase level  This was a concern because the patient had blood work that showed that his alkaline phosphatase was elevated.  On his repeat study his alkaline phosphatase is down in the normal range at 116 and his liver fraction and bone fraction are both normal.  His sed rate is down to 13 which would point against this being any type of an autoimmune problem.      Patient Active Problem List    Diagnosis Date Noted   • Elevated alkaline phosphatase level 12/05/2018   • Intrinsic eczema 12/05/2018   • Obesity (BMI 30-39.9) 10/08/2018   • History of tobacco abuse 03/21/2018   • Rodriguez's esophagus without dysplasia 01/31/2018   • Cervical spondylosis 01/18/2018   • Chronic migraine 05/11/2017   • BMI 31.0-31.9,adult 03/07/2017   • Rosacea 01/26/2016   • Type 2 diabetes mellitus without complication, without long-term current use of insulin ([facility]) 01/26/2016   • Frequent headaches 01/26/2016   • Major depressive disorder with single episode, in full remission ([facility]) 11/22/2010   • Hypogonadism male 11/22/2010   • ED (erectile dysfunction) 11/22/2010   • FRANK on CPAP 11/22/2010   • HTN (hypertension) 11/22/2010   • Acid reflux 11/22/2010      Allergies:Patient has no  known allergies.    Current Outpatient Prescriptions   Medication Sig Dispense Refill   • sumatriptan (IMITREX) 100 MG tablet Take 1/2-1 tab at onset of migraine, can repeat one time in 24 hours if headache persists 2 hours after first dose. 36 Tab 3   • topiramate (TOPAMAX) 100 MG Tab Take 1 Tab by mouth every bedtime. 90 Tab 3   • PARoxetine (PAXIL) 20 MG Tab Take 1 Tab by mouth every day. 30 Tab 11   • Ascorbic Acid (VITAMIN C) 1000 MG Tab Take 1 Tab by mouth every day.     • magnesium oxide (MAG-OX) 400 MG Tab Take 400 mg by mouth every day.     • loratadine (CLARITIN) 10 MG Tab Take 10 mg by mouth every day.     • Probiotic Product (PROBIOTIC-10 PO) Take 2 Caps by mouth every day.     • Cholecalciferol (VITAMIN D3) 5000 units Cap Take 1 Cap by mouth every day.     • Cyanocobalamin (VITAMIN B-12) 1000 MCG Tab Take 1,000 mcg by mouth every day.     • Glucosamine HCl 1500 MG Tab Take 1 Tab by mouth every day.     • triamcinolone acetonide (KENALOG) 0.1 % Cream A small amount applied twice daily until clear 45 g 3   • losartan (COZAAR) 100 MG Tab TAKE 1 TABLET EVERY DAY 90 Tab 3   • omeprazole (PRILOSEC) 20 MG delayed-release capsule TAKE 1 CAPSULE DAILY 90 Cap 3   • sildenafil citrate (VIAGRA) 50 MG tablet Take 1 Tab by mouth as needed for Erectile Dysfunction. 10 Tab 3   • metFORMIN (GLUCOPHAGE) 500 MG Tab Take 1 Tab by mouth 2 times a day, with meals. 180 Tab 3   • Aspirin-Acetaminophen-Caffeine (EXCEDRIN MIGRAINE PO) Take 1 Tab by mouth as needed.       No current facility-administered medications for this visit.        Social History   Substance Use Topics   • Smoking status: Former Smoker     Packs/day: 1.00     Years: 5.00     Types: Cigarettes     Quit date: 1/1/1982   • Smokeless tobacco: Never Used      Comment: quit 35 years ago   • Alcohol use 0.0 oz/week      Comment: approx 2 every 3 months.     Social History     Social History Narrative    Patient is  with an adult son. He works full time as  "a        Family History   Problem Relation Age of Onset   • Diabetes Father    • Heart Attack Father 60   • Cancer Neg Hx    • Suicide Attempts Neg Hx         ROS:     - Constitutional: *** Negative for fever, chills, unexpected weight change, and fatigue/generalized weakness.    - HEENT: *** Negative for headaches, vision changes, hearing changes, ear pain, ear discharge, rhinorrhea, sinus congestion, sore throat, and neck pain.      - Respiratory:*** Negative for cough, sputum production, chest congestion, dyspnea, wheezing, and crackles.      - Cardiovascular:*** Negative for chest pain, palpitations, orthopnea, and bilateral lower extremity edema.     - Gastrointestinal:*** Negative for heartburn, nausea, vomiting, abdominal pain, hematochezia, melena, diarrhea, constipation, and greasy/foul-smelling stools.     - Genitourinary:*** Negative for dysuria, polyuria, hematuria, pyuria, urinary urgency, and urinary incontinence.     - Musculoskeletal:*** Negative for myalgias, back pain, and joint pain.     - Skin:*** Negative for rash, itching, cyanotic skin color change.     - Neurological:*** Negative for dizziness, tingling, tremors, focal sensory deficit, focal weakness and headaches.     - Endo/Heme/Allergies:*** Does not bruise/bleed easily.     - Psychiatric/Behavioral:*** Negative for depression, suicidal/homicidal ideation and memory loss.        {ROSALLOTHERSNE}      Exam:  ***  Blood pressure 120/90, pulse 72, temperature 36.8 °C (98.2 °F), temperature source Temporal, resp. rate 16, height 1.778 m (5' 10\"), weight 98.7 kg (217 lb 9.5 oz), SpO2 98 %. Body mass index is 31.22 kg/m².    General:  Well nourished, well developed male in NAD  Head is grossly normal.  Neck: Supple without JVD or bruit. Thyroid is not enlarged.  Pulmonary: Clear to ausculation and percussion.  Normal effort. No rales, ronchi, or wheezing.  Cardiovascular: Regular rate and rhythm without murmur. Carotid " and radial pulses are intact and equal bilaterally.  Extremities: no clubbing, cyanosis, or edema.  ***  Please note that this dictation was created using voice recognition software. I have made every reasonable attempt to correct obvious errors, but I expect that there are errors of grammar and possibly content that I did not discover before finalizing the note.    Assessment/Plan:  1. Chronic migraine  ***  - sumatriptan (IMITREX) 100 MG tablet; Take 1/2-1 tab at onset of migraine, can repeat one time in 24 hours if headache persists 2 hours after first dose.  Dispense: 36 Tab; Refill: 3  - REFERRAL TO NEUROLOGY    2. Intrinsic eczema  ***  - REFERRAL TO DERMATOLOGY    3. Elevated alkaline phosphatase level  ***

## 2019-01-05 NOTE — PROGRESS NOTES
CC:Diagnoses of Chronic migraine, Intrinsic eczema, Elevated alkaline phosphatase level, and Frequent headaches were pertinent to this visit.    HISTORY OF PRESENT ILLNESS: Patient is a 58 y.o. male established patient who presents today to follow up on chronic health problems and labs.    Health Maintenance: Completed    Intrinsic eczema  This is a chronic health problem for this patient that he is been diligently using triamcinolone cream without relief.  He continues to have multiple lesions on his left arm as well as his lower extremities.  He even had a lesion show up in the scalp over those last month.  He is experiencing a great deal of pruritus.  I would like for him to go back to dermatology.  He was seen by the [facility] here in [place].  We will renew that referral.    Elevated alkaline phosphatase level  This was a concern because the patient had blood work that showed that his alkaline phosphatase was elevated.  On his repeat study his alkaline phosphatase is down in the normal range at 116 and his liver fraction and bone fraction are both normal.  His sed rate is down to 13 which would point against this being any type of an autoimmune problem.    Frequent headaches  This is a chronic problem that has not shown any improvement with his starting on Topiramate 50 mg qhs.  He did lose 3 lbs which is good.  I want to escalate his dose to 100 mg QHS and will also refer him back to ROBERT Aranda at the Headache clinic .  I told the patient I am not sure what else to recommend.  We did discuss the fact that he doesn't adequately hydrate during the day.  He is going to aim for 100 oz of H2O daily and see if that is helpful to his headaches.      Patient Active Problem List    Diagnosis Date Noted   • Elevated alkaline phosphatase level 12/05/2018   • Intrinsic eczema 12/05/2018   • Obesity (BMI 30-39.9) 10/08/2018   • History of tobacco abuse 03/21/2018   • Rodriguez's esophagus without dysplasia  01/31/2018   • Cervical spondylosis 01/18/2018   • Chronic migraine 05/11/2017   • BMI 31.0-31.9,adult 03/07/2017   • Rosacea 01/26/2016   • Type 2 diabetes mellitus without complication, without long-term current use of insulin ([facility]) 01/26/2016   • Frequent headaches 01/26/2016   • Major depressive disorder with single episode, in full remission ([facility]) 11/22/2010   • Hypogonadism male 11/22/2010   • ED (erectile dysfunction) 11/22/2010   • FRANK on CPAP 11/22/2010   • HTN (hypertension) 11/22/2010   • Acid reflux 11/22/2010      Allergies:Patient has no known allergies.    Current Outpatient Prescriptions   Medication Sig Dispense Refill   • sumatriptan (IMITREX) 100 MG tablet Take 1/2-1 tab at onset of migraine, can repeat one time in 24 hours if headache persists 2 hours after first dose. 36 Tab 3   • topiramate (TOPAMAX) 100 MG Tab Take 1 Tab by mouth every bedtime. 90 Tab 3   • PARoxetine (PAXIL) 20 MG Tab Take 1 Tab by mouth every day. 30 Tab 11   • Ascorbic Acid (VITAMIN C) 1000 MG Tab Take 1 Tab by mouth every day.     • magnesium oxide (MAG-OX) 400 MG Tab Take 400 mg by mouth every day.     • loratadine (CLARITIN) 10 MG Tab Take 10 mg by mouth every day.     • Probiotic Product (PROBIOTIC-10 PO) Take 2 Caps by mouth every day.     • Cholecalciferol (VITAMIN D3) 5000 units Cap Take 1 Cap by mouth every day.     • Cyanocobalamin (VITAMIN B-12) 1000 MCG Tab Take 1,000 mcg by mouth every day.     • Glucosamine HCl 1500 MG Tab Take 1 Tab by mouth every day.     • triamcinolone acetonide (KENALOG) 0.1 % Cream A small amount applied twice daily until clear 45 g 3   • losartan (COZAAR) 100 MG Tab TAKE 1 TABLET EVERY DAY 90 Tab 3   • omeprazole (PRILOSEC) 20 MG delayed-release capsule TAKE 1 CAPSULE DAILY 90 Cap 3   • sildenafil citrate (VIAGRA) 50 MG tablet Take 1 Tab by mouth as needed for Erectile Dysfunction. 10 Tab 3   • metFORMIN (GLUCOPHAGE) 500 MG Tab Take 1 Tab by mouth 2 times a day, with meals. 180 Tab 3   •  Aspirin-Acetaminophen-Caffeine (EXCEDRIN MIGRAINE PO) Take 1 Tab by mouth as needed.       No current facility-administered medications for this visit.        Social History   Substance Use Topics   • Smoking status: Former Smoker     Packs/day: 1.00     Years: 5.00     Types: Cigarettes     Quit date: 1/1/1982   • Smokeless tobacco: Never Used      Comment: quit 35 years ago   • Alcohol use 0.0 oz/week      Comment: approx 2 every 3 months.     Social History     Social History Narrative    Patient is  with an adult son. He works full time as a        Family History   Problem Relation Age of Onset   • Diabetes Father    • Heart Attack Father 60   • Cancer Neg Hx    • Suicide Attempts Neg Hx         ROS:     - Constitutional:  Negative for fever, chills, unexpected weight change, and fatigue/generalized weakness.    - HEENT:  Negative for headaches, vision changes, hearing changes, ear pain, ear discharge, rhinorrhea, sinus congestion, sore throat, and neck pain.      - Respiratory: Negative for cough, sputum production, chest congestion, dyspnea, wheezing, and crackles.      - Cardiovascular: Negative for chest pain, palpitations, orthopnea, and bilateral lower extremity edema.     - Gastrointestinal: Negative for heartburn, nausea, vomiting, abdominal pain, hematochezia, melena, diarrhea, constipation, and greasy/foul-smelling stools.     - Genitourinary: Negative for dysuria, polyuria, hematuria, pyuria, urinary urgency, and urinary incontinence.     - Musculoskeletal: Negative for myalgias, back pain, and joint pain.     - Skin: Negative for rash, itching, cyanotic skin color change.     - Neurological:Positive for continued problems with Headache.    - Endo/Heme/Allergies: Does not bruise/bleed easily.     - Psychiatric/Behavioral: Negative for depression, suicidal/homicidal ideation and memory loss.              Exam:    Blood pressure 120/90, pulse 72, temperature 36.8 °C (98.2 °F),  "temperature source Temporal, resp. rate 16, height 1.778 m (5' 10\"), weight 98.7 kg (217 lb 9.5 oz), SpO2 98 %. Body mass index is 31.22 kg/m².    General:  Well nourished, well developed male in NAD  Head is grossly normal.  Neck: Supple without JVD or bruit. Thyroid is not enlarged.  Pulmonary: Clear to ausculation and percussion.  Normal effort. No rales, ronchi, or wheezing.  Cardiovascular: Regular rate and rhythm without murmur. Carotid and radial pulses are intact and equal bilaterally.  Extremities: no clubbing, cyanosis, or edema.  LABS: 12/6/18: Results reviewed and discussed with the patient, questions answered.    Please note that this dictation was created using voice recognition software. I have made every reasonable attempt to correct obvious errors, but I expect that there are errors of grammar and possibly content that I did not discover before finalizing the note.    Assessment/Plan:  1. Chronic migraine  Uncontrolled, Patient needing refill of meds.  Will also increase his Topiramate to 100 mg nightly.  - sumatriptan (IMITREX) 100 MG tablet; Take 1/2-1 tab at onset of migraine, can repeat one time in 24 hours if headache persists 2 hours after first dose.  Dispense: 36 Tab; Refill: 3  - REFERRAL TO NEUROLOGY    2. Intrinsic eczema  Uncontrolled, patient needs to see dermatology for treatment.  Not certain why he is not seeing improvement from the Triamcinolone cream.  - REFERRAL TO DERMATOLOGY    3. Elevated alkaline phosphatase level  Controlled, no further problems according to labs.      4. Frequent headaches  Will refer back to Headache clinic.         "

## 2019-01-05 NOTE — ASSESSMENT & PLAN NOTE
This is a chronic problem that has not shown any improvement with his starting on Topiramate 50 mg qhs.  He did lose 3 lbs which is good.  I want to escalate his dose to 100 mg QHS and will also refer him back to ROBERT Aranda at the Headache clinic .  I told the patient I am not sure what else to recommend.  We did discuss the fact that he doesn't adequately hydrate during the day.  He is going to aim for 100 oz of H2O daily and see if that is helpful to his headaches.

## 2019-01-08 ENCOUNTER — TELEPHONE (OUTPATIENT)
Dept: NEUROLOGY | Facility: MEDICAL CENTER | Age: 59
End: 2019-01-08

## 2019-01-08 NOTE — TELEPHONE ENCOUNTER
Called pt again and lvm to let him know that I won't call in sumatriptan just because he had already gotten this from PCP and she called in a 90 day supply with 3 refills.

## 2019-01-08 NOTE — TELEPHONE ENCOUNTER
Called pt and lvm I let him know that I can only refill his sumatriptan 100 mg once with no extra refills because the last time he was seen was 02/2018 and he has no FV scheduled. I advised pt to call me back if he has any further questions.

## 2019-01-10 DIAGNOSIS — N52.1 ERECTILE DYSFUNCTION DUE TO DISEASES CLASSIFIED ELSEWHERE: ICD-10-CM

## 2019-01-10 RX ORDER — SILDENAFIL 50 MG/1
TABLET, FILM COATED ORAL
Qty: 6 TAB | Refills: 11 | Status: SHIPPED | OUTPATIENT
Start: 2019-01-10 | End: 2019-02-23 | Stop reason: SDUPTHER

## 2019-01-28 ENCOUNTER — TELEPHONE (OUTPATIENT)
Dept: MEDICAL GROUP | Facility: MEDICAL CENTER | Age: 59
End: 2019-01-28

## 2019-01-28 NOTE — TELEPHONE ENCOUNTER
VOICEMAIL  1. Caller Name: Miguel Angel Fernández                        Call Back Number: [phone] (home)      2. Message: Patient called and left a voicemail stating that we needed to fix his sildenafil citrate (VIAGRA) 50 MG tablet medication at [facility]. I have called [facility] and have fixed the medication and I have called the patient to let him know.     3. Patient approves office to leave a detailed voicemail/MyChart message: N\A

## 2019-01-31 ENCOUNTER — OFFICE VISIT (OUTPATIENT)
Dept: NEUROLOGY | Facility: MEDICAL CENTER | Age: 59
End: 2019-01-31
Payer: COMMERCIAL

## 2019-01-31 VITALS
OXYGEN SATURATION: 96 % | DIASTOLIC BLOOD PRESSURE: 62 MMHG | HEIGHT: 70 IN | HEART RATE: 76 BPM | TEMPERATURE: 98.1 F | BODY MASS INDEX: 31.35 KG/M2 | SYSTOLIC BLOOD PRESSURE: 128 MMHG | WEIGHT: 219 LBS

## 2019-01-31 DIAGNOSIS — R51.9 FREQUENT HEADACHES: ICD-10-CM

## 2019-01-31 DIAGNOSIS — G44.40 MEDICATION OVERUSE HEADACHE: ICD-10-CM

## 2019-01-31 PROCEDURE — 99215 OFFICE O/P EST HI 40 MIN: CPT | Performed by: PHYSICIAN ASSISTANT

## 2019-01-31 RX ORDER — NAPROXEN 500 MG/1
TABLET ORAL
Qty: 20 TAB | Refills: 0 | Status: SHIPPED | OUTPATIENT
Start: 2019-01-31 | End: 2019-06-10

## 2019-01-31 RX ORDER — TOPIRAMATE 100 MG/1
1 CAPSULE, EXTENDED RELEASE ORAL DAILY
Qty: 30 EACH | Refills: 11 | Status: SHIPPED | OUTPATIENT
Start: 2019-01-31 | End: 2019-06-10

## 2019-01-31 RX ORDER — DEXAMETHASONE 4 MG/1
TABLET ORAL
Qty: 12 TAB | Refills: 0 | Status: SHIPPED | OUTPATIENT
Start: 2019-01-31 | End: 2019-06-10

## 2019-01-31 ASSESSMENT — PATIENT HEALTH QUESTIONNAIRE - PHQ9
5. POOR APPETITE OR OVEREATING: NOT AT ALL
8. MOVING OR SPEAKING SO SLOWLY THAT OTHER PEOPLE COULD HAVE NOTICED. OR THE OPPOSITE, BEING SO FIGETY OR RESTLESS THAT YOU HAVE BEEN MOVING AROUND A LOT MORE THAN USUAL: NOT AT ALL
2. FEELING DOWN, DEPRESSED, IRRITABLE, OR HOPELESS: NOT AT ALL
1. LITTLE INTEREST OR PLEASURE IN DOING THINGS: NOT AT ALL
7. TROUBLE CONCENTRATING ON THINGS, SUCH AS READING THE NEWSPAPER OR WATCHING TELEVISION: NOT AT ALL
9. THOUGHTS THAT YOU WOULD BE BETTER OFF DEAD, OR OF HURTING YOURSELF: NOT AT ALL
6. FEELING BAD ABOUT YOURSELF - OR THAT YOU ARE A FAILURE OR HAVE LET YOURSELF OR YOUR FAMILY DOWN: NOT AL ALL
4. FEELING TIRED OR HAVING LITTLE ENERGY: NOT AT ALL
3. TROUBLE FALLING OR STAYING ASLEEP OR SLEEPING TOO MUCH: NOT AT ALL
SUM OF ALL RESPONSES TO PHQ9 QUESTIONS 1 AND 2: 0
SUM OF ALL RESPONSES TO PHQ QUESTIONS 1-9: 0

## 2019-01-31 NOTE — PROGRESS NOTES
"Cc:  New Daily Persistent Headache/Chronic migraine/medication overuse headache follow up    Pt initially seen by me in 2017.  At that time he had migraine headaches that had worsened after a fall from from a ladder.  Headache frequency was >15 days monthly with 6 days month considered severe.    Acute treatment included sumatriptan, phenergan, excedrin but limiting dose to 9 days monthly    Prevention - he was started on botox and was to titrate up on the elavil.      Interval History:    Using excedrin and sumatriptan daily and yet headaches persist.  Discussed medication overuse again and pt admits that he started doing his own thing because he was stressed and annoyed by situation with dentist.  He was Referred to dentist for TMJ evaluation - says \"that didn't work out\"  He says he couldn't wear the mouth guards that she prescribed.  He said \"I cut the cord on that.\"  I asked if he followed back up with her and he is vague - sounds like he got upset about the bill and just stopped going and failed to follow up    Prevention - quit botox on his own without consulting our office.  He also stopped his elavil - claims \"it wasn't working\" and was switched to topamax but he has hx of kidney stones.  I asked what dose he got to with elavil and he can't remember.  I also reminded him that he had only had one 195 unit injection of botox - not enough to know if it worked but as I recall pt had a hard time with botox - found it to be really painful - so I suspect that is why he decided not to continue    Exercise - isn't doing    ONB auth was authorized but he didn't follow through - he is under impression that was tried once at [facility] and it didn't take away his headaches.  Reminded that we will use these shots for his cervicogenic tenderness and to help get off medication overuse.  It is not going to cure him of all his headaches.    topamax - he takes 100 mg daily not BID.  Advised that topamax has a 12 hour " half life so taking just once day is not recommended.    I confronted patient on failure to follow up and resorting back to previous bad habits.  He appears easily confused in the office and so I went over the plan with him several times.  I also want him to return in 30 days so again he doesn't fail to follow up and we are starting over again.                A/p:  Medication overuse headache - need to track migraine frequency, ONB/authorization if possible OR if not, then we will use a steroid taper    Patient non-compliance - RTC in one month    Switch to extended release topamax  (qudexy) - which we will try until you return in a month and at that time we will see how you are doing and whether we have to switch to something    Patient instructions provided to him with all RX as he has decided he wants to switch pharmacies:    1) need to track migraine frequency and put an X if you take sumatriptan or excedrin to treat headache     2) RTC in one month - in meantime we will Switch to extended release topamax  (qudexy) - which we will try until you return in a month and at that time we will see how you are doing and whether we have to switch to something.  In two weeks call Izzy [phone] if headache frequency remains >3 days week and we will increase dose of qudexy to 150 mg.     Abrupt termination of acute rebound medications:      Naproxen 500 mg one in AM and one in PM for 10 days - scheduled to take daily  NOT because you have a headache     If headache begins add on a steroid:     Dexamethasone  4 mg twice daily  x 4 days; then one daily for 4 days - log on your calendar so you keep track of where you are.     If above doesn't work add on: taking sumatriptan +/- excedrin for migraine but is strictly limited to 2 x week     ALSO - ONB auth will be obtained and they will you when received.  You can schedule appt for injections (6) if medication isn't helping to stop the daily need for excedrin or sumatriptan as  you do have cervicogenic tenderness as well as the medication overuse.    Total time with this visit: 40    Minutes face-to-face with patient. More than 50% of this visit was spent educating patient on their illness and/or coordinating care, as detailed above

## 2019-01-31 NOTE — PATIENT INSTRUCTIONS
A/p:  Medication overuse headache -     1) need to track migraine frequency and put an X if you take sumatriptan or excedrin to treat headache     2) RTC in one month - in meantime we will Switch to extended release topamax  (qudexy) - which we will try until you return in a month and at that time we will see how you are doing and whether we have to switch to something.  In two weeks call Izzy [phone] if headache frequency remains >3 days week and we will increase dose of qudexy to 150 mg.     Abrupt termination of acute rebound medications available only for patients    7-10 day therapeutic bridge any of the below 4 options +/- another:    Naproxen 500 mg one in AM and one in PM for 10 days - scheduled NOT because you have a headache    If headache begins add on a steroid:    Dexamethasone  4 mg twice daily  x 4 days; then one daily for 4 days    If above doesn't work add on: taking sumatriptan for migraine but is strictly limited to 2 x week    ALSO - ONB auth will be obtained and they will you when received.  You can schedule appt for injections (6) if medication isn't helping to stop the daily need for excedrin or sumatriptan

## 2019-01-31 NOTE — Clinical Note
Please obtain onb and trigger and then call pt to advise.  He may or may not choose to schedule appt when you call him. Thanks.

## 2019-02-28 ENCOUNTER — OFFICE VISIT (OUTPATIENT)
Dept: NEUROLOGY | Facility: MEDICAL CENTER | Age: 59
End: 2019-02-28
Payer: COMMERCIAL

## 2019-02-28 VITALS
BODY MASS INDEX: 30.06 KG/M2 | DIASTOLIC BLOOD PRESSURE: 78 MMHG | WEIGHT: 210 LBS | TEMPERATURE: 97.5 F | OXYGEN SATURATION: 96 % | SYSTOLIC BLOOD PRESSURE: 120 MMHG | HEART RATE: 91 BPM | HEIGHT: 70 IN

## 2019-02-28 DIAGNOSIS — G44.40 MEDICATION OVERUSE HEADACHE: ICD-10-CM

## 2019-02-28 PROCEDURE — 99213 OFFICE O/P EST LOW 20 MIN: CPT | Performed by: PHYSICIAN ASSISTANT

## 2019-02-28 RX ORDER — TOPIRAMATE 100 MG/1
1 CAPSULE, EXTENDED RELEASE ORAL DAILY
Qty: 30 EACH | Refills: 11 | Status: SHIPPED | OUTPATIENT
Start: 2019-02-28 | End: 2019-06-10

## 2019-02-28 NOTE — PROGRESS NOTES
"Cc:  New daily persistent headache/chronic migraine/medication overuse headache follow up:    Last visit:    Using excedrin and sumatriptan daily and yet headaches persist.  Discussed medication overuse again and pt admits that he started doing his own thing because he was stressed and annoyed by situation with dentist.  He was Referred to dentist for TMJ evaluation - says \"that didn't work out\"  He says he couldn't wear the mouth guards that she prescribed.  He said \"I cut the cord on that.\"  I asked if he followed back up with her and he is vague - sounds like he got upset about the bill and just stopped going and failed to follow up     Prevention - quit botox on his own without consulting our office.  He also stopped his elavil - claims \"it wasn't working\" and was switched to topamax but he has hx of kidney stones.  I asked what dose he got to with elavil and he can't remember.  I also reminded him that he had only had one 195 unit injection of botox - not enough to know if it worked but as I recall pt had a hard time with botox - found it to be really painful - so I suspect that is why he decided not to continue     Exercise - isn't doing     ONB auth was authorized but he didn't follow through - he is under impression that was tried once at [facility] and it didn't take away his headaches.  Reminded that we will use these shots for his cervicogenic tenderness and to help get off medication overuse.  It is not going to cure him of all his headaches.     topamax - he takes 100 mg daily not BID.  Advised that topamax has a 12 hour half life so taking just once day is not recommended.     I confronted patient on failure to follow up and resorting back to previous bad habits.  He appears easily confused in the office and so I went over the plan with him several times.  I also want him to return in 30 days so again he doesn't fail to follow up and we are starting over again.    Interval History:    Diary " review: did steroid taper to get off medication overuse and had no migraine or headache until 10 days ago.  Once again he began taking one excedrin daily.  Discussed again option of ONB and he is willing to try this.    Acute treatment - restricted use to 10 days monthly?  Difficult for pt not to begin again taking excedrin every day when headaches return.    Prevention:  Switched to qudexy - just once daily now to make it easier to be compliant.  100 mg daily which is working well.      A/p:  Chronic migraine/medication overuse headache - already tried and failed botox due to intolerance of injections.  Return tomorrow for ONB injections.  T/c referral to [facility] if ONB doesn't work or once again migraines/headaches return daily after ONB wears off    Total time with this visit:  15   Minutes face-to-face with patient. More than 50% of this visit was spent educating patient on their illness and/or coordinating care, as detailed above

## 2019-03-01 ENCOUNTER — OFFICE VISIT (OUTPATIENT)
Dept: NEUROLOGY | Facility: MEDICAL CENTER | Age: 59
End: 2019-03-01
Payer: COMMERCIAL

## 2019-03-01 VITALS
DIASTOLIC BLOOD PRESSURE: 80 MMHG | BODY MASS INDEX: 30.92 KG/M2 | SYSTOLIC BLOOD PRESSURE: 110 MMHG | HEIGHT: 70 IN | RESPIRATION RATE: 16 BRPM | WEIGHT: 216 LBS | HEART RATE: 82 BPM | TEMPERATURE: 97.7 F | OXYGEN SATURATION: 93 %

## 2019-03-01 DIAGNOSIS — G44.86 CERVICOGENIC HEADACHE: ICD-10-CM

## 2019-03-01 PROCEDURE — 64400 NJX AA&/STRD TRIGEMINAL NRV: CPT | Mod: 50,GZ | Performed by: PHYSICIAN ASSISTANT

## 2019-03-01 PROCEDURE — 64405 NJX AA&/STRD GR OCPL NRV: CPT | Mod: 50,GZ,59 | Performed by: PHYSICIAN ASSISTANT

## 2019-03-01 PROCEDURE — 64450 NJX AA&/STRD OTHER PN/BRANCH: CPT | Mod: 50,GZ,59 | Performed by: PHYSICIAN ASSISTANT

## 2019-03-01 RX ORDER — BUPIVACAINE HYDROCHLORIDE 5 MG/ML
20 INJECTION, SOLUTION EPIDURAL; INTRACAUDAL ONCE
Status: COMPLETED | OUTPATIENT
Start: 2019-03-01 | End: 2019-03-01

## 2019-03-01 RX ORDER — TRIAMCINOLONE ACETONIDE 40 MG/ML
80 INJECTION, SUSPENSION INTRA-ARTICULAR; INTRAMUSCULAR ONCE
Status: COMPLETED | OUTPATIENT
Start: 2019-03-01 | End: 2019-03-01

## 2019-03-01 RX ADMIN — BUPIVACAINE HYDROCHLORIDE 20 ML: 5 INJECTION, SOLUTION EPIDURAL; INTRACAUDAL at 17:02

## 2019-03-01 RX ADMIN — TRIAMCINOLONE ACETONIDE 80 MG: 40 INJECTION, SUSPENSION INTRA-ARTICULAR; INTRAMUSCULAR at 17:02

## 2019-03-02 NOTE — PROGRESS NOTES
Est patient of mine    Patient has cervicogenic tenderness and headache located in back of head, along temples, in forehead in area of eyes.  These are his initial ONBs - never had them before.  Options were discussed with patient and we will proceed with the following set of injections:        BONB procedure:    I performed a bilateral occipital nerve block in clinic today, injecting bupivacaine [5] cc and triamcinolone [40] mg in both sides.      I also injected bilateral trigeminal nerves in clinic today, injecting bupivacaine 1.5 cc  and 3.5 cc bupivicaine in bilateral lesser occipital nerves.    Prior to performing the procedure, the risks and side-effects were discussed with patient including risk for infection, pain, loss of hair, worsening of symptoms.    The patient tolerated the procedure well; there were no complications.    Asked pt to call Izzy in about 2 weeks and let us know whether they worked or not.

## 2019-04-24 ENCOUNTER — OFFICE VISIT (OUTPATIENT)
Dept: NEUROLOGY | Facility: MEDICAL CENTER | Age: 59
End: 2019-04-24
Payer: COMMERCIAL

## 2019-04-24 VITALS
SYSTOLIC BLOOD PRESSURE: 120 MMHG | DIASTOLIC BLOOD PRESSURE: 74 MMHG | BODY MASS INDEX: 30.92 KG/M2 | HEIGHT: 70 IN | TEMPERATURE: 98.3 F | OXYGEN SATURATION: 98 % | HEART RATE: 75 BPM | WEIGHT: 216 LBS | RESPIRATION RATE: 16 BRPM

## 2019-04-24 DIAGNOSIS — G44.86 CERVICOGENIC HEADACHE: ICD-10-CM

## 2019-04-24 PROCEDURE — S0020 INJECTION, BUPIVICAINE HYDRO: HCPCS | Performed by: PHYSICIAN ASSISTANT

## 2019-04-24 PROCEDURE — 64450 NJX AA&/STRD OTHER PN/BRANCH: CPT | Mod: 50,GZ | Performed by: PHYSICIAN ASSISTANT

## 2019-04-24 PROCEDURE — 64405 NJX AA&/STRD GR OCPL NRV: CPT | Mod: 50,GZ | Performed by: PHYSICIAN ASSISTANT

## 2019-04-24 PROCEDURE — 64400 NJX AA&/STRD TRIGEMINAL NRV: CPT | Mod: 50 | Performed by: PHYSICIAN ASSISTANT

## 2019-04-24 RX ORDER — BUPIVACAINE HYDROCHLORIDE 5 MG/ML
20 INJECTION, SOLUTION EPIDURAL; INTRACAUDAL ONCE
Status: COMPLETED | OUTPATIENT
Start: 2019-04-24 | End: 2019-04-24

## 2019-04-24 RX ADMIN — BUPIVACAINE HYDROCHLORIDE 20 ML: 5 INJECTION, SOLUTION EPIDURAL; INTRACAUDAL at 16:08

## 2019-04-24 NOTE — PROGRESS NOTES
Est patient of mine    Patient has cervicogenic tenderness and headache located in back of head, along temples, in forehead in area of eyes.    Pt has received ONB and these trigger injections in past and received significant relief of headache/migraine therefore is requesting repeat injections.    Options were discussed with patient and we will proceed with the following set of injections:        BONB procedure:    I performed a bilateral occipital nerve block in clinic today, injecting bupivacaine [5] cc in both sides.      I also injected bilateral trigeminal nerves in clinic today, injecting bupivacaine 1.5 cc  and 3.5 cc bupivicaine in bilateral lesser occipital nerves.    Prior to performing the procedure, the risks and side-effects were discussed with patient including risk for infection, pain, loss of hair, worsening of symptoms.    The patient tolerated the procedure well; there were no complications.

## 2019-04-24 NOTE — TELEPHONE ENCOUNTER
Was the patient seen in the last year in this department? Yes    Does patient have an active prescription for medications requested? No     Received Request Via: Patient   Verbally okay'd by Alma Delia.     Sumatriptan 100mg tablets     #9 with 2 refills     Directions:    Take 1/2 to 1 tab at onset of migraine, can repeat one time in 24 hours if headache persists 2 hours after first dose.       [id number] - [place], [place] - [address] AT [place] [phone] (Phone)  [phone] (Fax)     Called in 04/24/19

## 2019-05-17 DIAGNOSIS — K21.9 GASTROESOPHAGEAL REFLUX DISEASE, ESOPHAGITIS PRESENCE NOT SPECIFIED: ICD-10-CM

## 2019-05-17 RX ORDER — OMEPRAZOLE 20 MG/1
CAPSULE, DELAYED RELEASE ORAL
Qty: 90 CAP | Refills: 3 | Status: SHIPPED | OUTPATIENT
Start: 2019-05-17 | End: 2019-09-16 | Stop reason: SDUPTHER

## 2019-06-04 RX ORDER — SUMATRIPTAN 100 MG/1
TABLET, FILM COATED ORAL
Qty: 15 TAB | Refills: 1 | Status: SHIPPED | OUTPATIENT
Start: 2019-06-04 | End: 2022-09-07

## 2019-06-04 NOTE — TELEPHONE ENCOUNTER
Was the patient seen in the last year in this department? Yes Last seen by Alma Delia  04/24/19    Does patient have an active prescription for medications requested? No     Received Request Via: Pharmacy    NU2U 08/02/19

## 2019-06-10 ENCOUNTER — APPOINTMENT (OUTPATIENT)
Dept: SLEEP MEDICINE | Facility: MEDICAL CENTER | Age: 59
End: 2019-06-10
Payer: COMMERCIAL

## 2019-06-10 ENCOUNTER — SLEEP CENTER VISIT (OUTPATIENT)
Dept: SLEEP MEDICINE | Facility: MEDICAL CENTER | Age: 59
End: 2019-06-10
Payer: COMMERCIAL

## 2019-06-10 VITALS — WEIGHT: 213 LBS | BODY MASS INDEX: 30.49 KG/M2 | HEIGHT: 70 IN | RESPIRATION RATE: 16 BRPM

## 2019-06-10 DIAGNOSIS — G47.33 OSA ON CPAP: Chronic | ICD-10-CM

## 2019-06-10 DIAGNOSIS — Z87.891 FORMER SMOKER: ICD-10-CM

## 2019-06-10 DIAGNOSIS — E11.9 TYPE 2 DIABETES MELLITUS WITHOUT COMPLICATION, WITHOUT LONG-TERM CURRENT USE OF INSULIN (HCC): ICD-10-CM

## 2019-06-10 DIAGNOSIS — I10 ESSENTIAL HYPERTENSION: ICD-10-CM

## 2019-06-10 DIAGNOSIS — E66.9 OBESITY (BMI 30-39.9): ICD-10-CM

## 2019-06-10 PROCEDURE — 99214 OFFICE O/P EST MOD 30 MIN: CPT | Performed by: NURSE PRACTITIONER

## 2019-06-10 RX ORDER — HYDROCODONE BITARTRATE AND ACETAMINOPHEN 5; 325 MG/1; MG/1
TABLET ORAL
Refills: 0 | COMMUNITY
Start: 2019-05-28 | End: 2022-09-07

## 2019-06-10 RX ORDER — CHLORHEXIDINE GLUCONATE ORAL RINSE 1.2 MG/ML
SOLUTION DENTAL
Refills: 0 | COMMUNITY
Start: 2019-05-28 | End: 2022-09-07

## 2019-06-10 NOTE — PROGRESS NOTES
Chief Complaint   Patient presents with   • Follow-Up     CPAP       HPI:  Miguel Angel Fernández is a 59 y.o. year old male here today for follow-up on FRANK.  Patient was diagnosed in 2009 previously by Dr. alicia and Dr. Cai.  He does have 2 devices with 1 of them being greater than 6 years old and the humidifier is currently whistling and not working properly.  He feels the pressure is still adequate.    Patient is currently using CPAP 11cm H2O nightly.  Compliance card 2/24/2019 through 3/25/2019 indicates 100% compliance, average nightly use of 8 hours 21 minutes, minimal mask leaking and an overall AHI of 0.5.  I reviewed findings with patient.  He overall feels rested on therapy and denies morning headaches.  He notes he cannot sleep without his device.    He just retired from being a  and does carry a CDL.  He is unsure if he will keep this.  He has a history of chronic migraines and does receive injections for control of these but he continues to get them about every 8-10 hours and during the middle the night.  He has not had a recent overnight oximetry on his machine tested.  He also has a history of diabetes type 2 and on metformin.  He is attempting to manage this also diet and exercise.  He denies cardiac or respiratory symptoms.  BMi 30.          ROS: As per HPI and otherwise negative if not stated.    Past Medical History:   Diagnosis Date   • Bowel habit changes     10/25/18-resolved   • Chickenpox as child   • Closed head injury 2004    fell off ladder.   • Cold 10/25/2018    States a little laryngitis and sore throat in past couple days. Denies fevers.    • Depression    • Dermatitis     to arms and face and pt pick at and scratches sites.   • Diabetes (HCC)     orals meds. 10/25/18-pt does not check glucose.    • Elevated alkaline phosphatase level 12/5/2018   • Frequent headaches 1/26/2016    10/25/18-twice daily.   • GERD (gastroesophageal reflux disease)    • Croatian measles as child   •  Heart burn     treated with omeprazole   • Hiatus hernia syndrome    • Hyperglycemia 1/26/2016   • Hypertension    • Incomplete tear of right rotator cuff 10/8/2018   • Indigestion     treated with omeprazole.   • Kidney stone    • Major depressive disorder with single episode, in full remission ([facility]) 11/22/2010   • Psychiatric problem     states depression/anxiety   • Right shoulder pain 10/25/2018   • Rosacea 1/26/2016   • Sleep apnea     cpap   • Snoring        Past Surgical History:   Procedure Laterality Date   • SHOULDER DECOMPRESSION ARTHROSCOPIC Right 11/1/2018    Procedure: SHOULDER DECOMPRESSION ARTHROSCOPIC- SUBACROMIAL;  Surgeon: Nic Thompson M.D.;  Location: SURGERY [facility];  Service: Orthopedics   • CLAVICLE DISTAL EXCISION Right 11/1/2018    Procedure: CLAVICLE DISTAL EXCISION;  Surgeon: Nic Thompson M.D.;  Location: SURGERY [facility];  Service: Orthopedics   • COLONOSCOPY  06/2018    hx x3   • HIP ARTHROPLASTY TOTAL Left 12/30/2016    Procedure: HIP ARTHROPLASTY TOTAL;  Surgeon: Dc Sanchez M.D.;  Location: SURGERY [facility];  Service:    • VENTRAL HERNIA REPAIR  9/28/2016    Procedure: VENTRAL HERNIA REPAIR W/MESH;  Surgeon: Corona Delvalle M.D.;  Location: SURG[facility];  Service:    • SEPTOTURBINOPLASTY  8/17/2010    Performed by VERONICA FRIEDMAN at SURGERY SAME DAY [facility]   • CYSTOSCOPY  04/2010    with lithotripsy for kidney stone   • MENISCECTOMY Right 2010   • EYE SURGERY Right 2008    lasik rt eye   • ELBOW ORIF Left 10/2004   • ACL REPAIR Left 08/2002   • DENTAL EXTRACTION(S)  1986    wisdom teeth   • TESTICLE EXPLORATION  1972    Undescended testicle       Family History   Problem Relation Age of Onset   • Diabetes Father    • Heart Attack Father 60   • Cancer Neg Hx    • Suicide Attempts Neg Hx        Social History     Social History   • Marital status: Single     Spouse name: N/A   • Number of children: N/A   • Years of education: N/A  "    Occupational History   • Not on file.     Social History Main Topics   • Smoking status: Former Smoker     Packs/day: 1.00     Years: 5.00     Types: Cigarettes     Quit date: 1/1/1982   • Smokeless tobacco: Never Used      Comment: quit 35 years ago   • Alcohol use 0.0 oz/week      Comment: approx 2 every 3 months.   • Drug use: Yes     Types: Inhaled      Comment: 2-3 hits once a week   • Sexual activity: Not Currently     Other Topics Concern   • Not on file     Social History Narrative    Patient is  with an adult son. He works full time as a        Allergies as of 06/10/2019   • (No Known Allergies)        @Vital signs for this encounter:  Vitals:    06/10/19 0751   Height: 1.778 m (5' 10\")   Weight: 96.6 kg (213 lb)   Weight % change since last entry.: 0 %   BMI (Calculated): 30.56   Resp: 16       Current medications as of today   Current Outpatient Prescriptions   Medication Sig Dispense Refill   • HYDROcodone-acetaminophen (NORCO) 5-325 MG Tab per tablet TAKE 1 TABLET BY MOUTH EVERY 4 TO 6 HOURS AS NEEDED FOR PAIN  0   • sumatriptan (IMITREX) 100 MG tablet TAKE  ONE-HALF TO ONE TABLET BY MOUTH AT ONSET OF HEADACHE; MAY REPEAT ONE TABLET IN 2 HOURS IF NEEDED. MAXIMUM OF 2 TABLETS IN 24 HOURS 15 Tab 1   • omeprazole (PRILOSEC) 20 MG delayed-release capsule TAKE 1 CAPSULE DAILY 90 Cap 3   • metFORMIN (GLUCOPHAGE) 500 MG Tab TAKE 1 TABLET 2 TIMES DAILY WITH MEALS 180 Tab 3   • PARoxetine (PAXIL) 20 MG Tab Take 1 Tab by mouth every day. 30 Tab 11   • Ascorbic Acid (VITAMIN C) 1000 MG Tab Take 1 Tab by mouth every day.     • magnesium oxide (MAG-OX) 400 MG Tab Take 400 mg by mouth every day.     • loratadine (CLARITIN) 10 MG Tab Take 10 mg by mouth every day.     • Cholecalciferol (VITAMIN D3) 5000 units Cap Take 1 Cap by mouth every day.     • Cyanocobalamin (VITAMIN B-12) 1000 MCG Tab Take 1,000 mcg by mouth every day.     • Glucosamine HCl 1500 MG Tab Take 1 Tab by mouth every day. "     • losartan (COZAAR) 100 MG Tab TAKE 1 TABLET EVERY DAY 90 Tab 3   • chlorhexidine (PERIDEX) 0.12 % Solution RINSE WITH 15ML FOR 30 SECONDS AND SPIT TWICE DAILY  0   • sildenafil citrate (VIAGRA) 50 MG tablet TAKE ONE TABLET BY MOUTH DAILY AS NEEDED  FOR ERETILE DYSFUNCTION. 10 Tab 3   • Probiotic Product (PROBIOTIC-10 PO) Take 2 Caps by mouth every day.     • Aspirin-Acetaminophen-Caffeine (EXCEDRIN MIGRAINE PO) Take 1 Tab by mouth as needed.       No current facility-administered medications for this visit.          Physical Exam:   Gen:           Alert and oriented, No apparent distress. Mood and affect appropriate, normal interaction with examiner.  Eyes:          PERRL, EOM intact, sclere white, conjunctive moist. Glasses.  Ears:          Not examined.   Hearing:     Grossly intact.  Nose:          Normal, no lesions or deformities.  Dentition:    Good dentition.  Oropharynx:   Tongue normal.  Mallampati Classification: not examined.  Neck:        Supple, trachea midline, no masses.  Respiratory Effort: No intercostal retractions or use of accessory muscles.   Lung Auscultation:      Clear to auscultation bilaterally; no rales, rhonchi or wheezing.  CV:            Regular rate and rhythm. No murmurs, rubs or gallops.  Abd:           Not examined.   Lymphadenopathy: Not examined.  Gait and Station: Normal.  Digits and Nails: No clubbing, cyanosis, petechiae, or nodes.   Cranial Nerves: II-XII grossly intact.  Skin:        No rashes, lesions or ulcers noted.               Ext:           No cyanosis or edema.      Assessment:  1. FRANK on CPAP  DME CPAP    DME Mask and Supplies    Overnight Oximetry   2. Obesity (BMI 30-39.9)  Height And Weight   3. Essential hypertension     4. Chronic migraine  Overnight Oximetry   5. Type 2 diabetes mellitus without complication, without long-term current use of insulin (HCC)     6. Former smoker         Immunizations:    Flu:10/2018  Pneumovax 23:1/2018  Prevnar 13:due age  63    Plan:  1.  Continue CPAP nightly.  FRANK is clinically stable.  DME CPAP 11 cm H2O nightly.  DME mask/supplies.  CNOX on Pap to rule out hypoxia related to his chronic migraines.  Advised patient call for results.  2.  Discussed sleep hygiene.  3.  Encourage weight loss.  4.  Follow-up in 1 year with compliance card, sooner if needed.    Please note that this dictation was created using voice recognition software. I have made every reasonable attempt to correct obvious errors, but it is possible there are errors of grammar and possibly content that I did not discover before finalizing the note.

## 2019-06-11 ENCOUNTER — HOSPITAL ENCOUNTER (OUTPATIENT)
Dept: RADIOLOGY | Facility: MEDICAL CENTER | Age: 59
End: 2019-06-11
Attending: CHIROPRACTOR
Payer: COMMERCIAL

## 2019-06-11 DIAGNOSIS — M99.01 CERVICAL SEGMENT DYSFUNCTION: ICD-10-CM

## 2019-06-11 PROCEDURE — 72050 X-RAY EXAM NECK SPINE 4/5VWS: CPT

## 2019-06-13 ENCOUNTER — APPOINTMENT (OUTPATIENT)
Dept: SLEEP MEDICINE | Facility: MEDICAL CENTER | Age: 59
End: 2019-06-13
Attending: NURSE PRACTITIONER
Payer: COMMERCIAL

## 2019-09-16 ENCOUNTER — TELEPHONE (OUTPATIENT)
Dept: MEDICAL GROUP | Facility: MEDICAL CENTER | Age: 59
End: 2019-09-16

## 2019-09-16 DIAGNOSIS — E11.9 TYPE 2 DIABETES MELLITUS WITHOUT COMPLICATION, WITHOUT LONG-TERM CURRENT USE OF INSULIN (HCC): ICD-10-CM

## 2019-09-16 DIAGNOSIS — K21.9 GASTROESOPHAGEAL REFLUX DISEASE, ESOPHAGITIS PRESENCE NOT SPECIFIED: ICD-10-CM

## 2019-09-16 RX ORDER — OMEPRAZOLE 20 MG/1
CAPSULE, DELAYED RELEASE ORAL
Qty: 90 CAP | Refills: 3 | Status: SHIPPED | OUTPATIENT
Start: 2019-09-16 | End: 2022-11-12

## 2019-09-16 NOTE — TELEPHONE ENCOUNTER
VOICEMAIL  1. Caller Name: Miguel Angel Fernández                        Call Back Number: [phone] (home)      2. Message: Patient called and left message stating we needed to sent over omeprazole (PRILOSEC) 20 MG delayed-release capsule and metFORMIN (GLUCOPHAGE) 500 MG Tab over to [facility]. I have sent Dr. Bravo a refill request for patient.         3. Patient approves office to leave a detailed voicemail/MyChart message: N\A

## 2019-11-08 DIAGNOSIS — N52.1 ERECTILE DYSFUNCTION DUE TO DISEASES CLASSIFIED ELSEWHERE: ICD-10-CM

## 2019-11-10 RX ORDER — SILDENAFIL 50 MG/1
TABLET, FILM COATED ORAL
Qty: 6 TAB | Refills: 2 | Status: SHIPPED | OUTPATIENT
Start: 2019-11-10 | End: 2019-11-15 | Stop reason: SDUPTHER

## 2019-11-15 DIAGNOSIS — N52.1 ERECTILE DYSFUNCTION DUE TO DISEASES CLASSIFIED ELSEWHERE: ICD-10-CM

## 2019-11-15 RX ORDER — SILDENAFIL 50 MG/1
TABLET, FILM COATED ORAL
Qty: 6 TAB | Refills: 11 | Status: SHIPPED | OUTPATIENT
Start: 2019-11-15 | End: 2022-11-12

## 2019-11-15 NOTE — TELEPHONE ENCOUNTER
Was the patient seen in the last year in this department? Yes    Does patient have an active prescription for medications requested? No     Received Request Via: Patient     Using Different PHARMACY

## 2020-01-06 ENCOUNTER — TELEPHONE (OUTPATIENT)
Dept: MEDICAL GROUP | Facility: MEDICAL CENTER | Age: 60
End: 2020-01-06

## 2020-01-06 NOTE — TELEPHONE ENCOUNTER
VOICEMAIL  1. Caller Name: Miguel Angel Fernández                        Call Back Number: [phone] (home)      2. Message: Patient called and left a message about his insurance. I have called him back and left him a message to call us back.     3. Patient approves office to leave a detailed voicemail/MyChart message: yes

## 2020-01-07 NOTE — TELEPHONE ENCOUNTER
Phone Number Called: [phone] (home)      Call outcome: spoke with patient    Message: I have spoken with patient about his new insurance. He will check with his insurance to see what his out of network benefits are.

## 2020-06-13 ENCOUNTER — HOSPITAL ENCOUNTER (EMERGENCY)
Dept: HOSPITAL 8 - ED | Age: 60
Discharge: HOME | End: 2020-06-13
Payer: COMMERCIAL

## 2020-06-13 VITALS — WEIGHT: 181 LBS | BODY MASS INDEX: 25.91 KG/M2 | HEIGHT: 70 IN

## 2020-06-13 VITALS — DIASTOLIC BLOOD PRESSURE: 90 MMHG | SYSTOLIC BLOOD PRESSURE: 139 MMHG

## 2020-06-13 DIAGNOSIS — E11.9: ICD-10-CM

## 2020-06-13 DIAGNOSIS — I10: ICD-10-CM

## 2020-06-13 DIAGNOSIS — N13.2: Primary | ICD-10-CM

## 2020-06-13 DIAGNOSIS — N23: ICD-10-CM

## 2020-06-13 LAB
ALBUMIN SERPL-MCNC: 3.9 G/DL (ref 3.4–5)
ALP SERPL-CCNC: 107 U/L (ref 45–117)
ALT SERPL-CCNC: 27 U/L (ref 12–78)
ANION GAP SERPL CALC-SCNC: 5 MMOL/L (ref 5–15)
BASOPHILS # BLD AUTO: 0.03 X10^3/UL (ref 0–0.1)
BASOPHILS NFR BLD AUTO: 1 % (ref 0–1)
BILIRUB SERPL-MCNC: 0.7 MG/DL (ref 0.2–1)
CALCIUM SERPL-MCNC: 9.2 MG/DL (ref 8.5–10.1)
CHLORIDE SERPL-SCNC: 107 MMOL/L (ref 98–107)
CREAT SERPL-MCNC: 1.29 MG/DL (ref 0.7–1.3)
EOSINOPHIL # BLD AUTO: 0.13 X10^3/UL (ref 0–0.4)
EOSINOPHIL NFR BLD AUTO: 2 % (ref 1–7)
ERYTHROCYTE [DISTWIDTH] IN BLOOD BY AUTOMATED COUNT: 13.8 % (ref 9.4–14.8)
LYMPHOCYTES # BLD AUTO: 2.1 X10^3/UL (ref 1–3.4)
LYMPHOCYTES NFR BLD AUTO: 34 % (ref 22–44)
MCH RBC QN AUTO: 30.3 PG (ref 27.5–34.5)
MCHC RBC AUTO-ENTMCNC: 32.5 G/DL (ref 33.2–36.2)
MCV RBC AUTO: 93.2 FL (ref 81–97)
MD: NO
MICROSCOPIC: (no result)
MONOCYTES # BLD AUTO: 0.63 X10^3/UL (ref 0.2–0.8)
MONOCYTES NFR BLD AUTO: 10 % (ref 2–9)
NEUTROPHILS # BLD AUTO: 3.22 X10^3/UL (ref 1.8–6.8)
NEUTROPHILS NFR BLD AUTO: 53 % (ref 42–75)
PLATELET # BLD AUTO: 208 X10^3/UL (ref 130–400)
PMV BLD AUTO: 9.9 FL (ref 7.4–10.4)
PROT SERPL-MCNC: 7.4 G/DL (ref 6.4–8.2)
RBC # BLD AUTO: 4.99 X10^6/UL (ref 4.38–5.82)

## 2020-06-13 PROCEDURE — 96375 TX/PRO/DX INJ NEW DRUG ADDON: CPT

## 2020-06-13 PROCEDURE — 80053 COMPREHEN METABOLIC PANEL: CPT

## 2020-06-13 PROCEDURE — 74176 CT ABD & PELVIS W/O CONTRAST: CPT

## 2020-06-13 PROCEDURE — 96374 THER/PROPH/DIAG INJ IV PUSH: CPT

## 2020-06-13 PROCEDURE — 99284 EMERGENCY DEPT VISIT MOD MDM: CPT

## 2020-06-13 PROCEDURE — 81001 URINALYSIS AUTO W/SCOPE: CPT

## 2020-06-13 PROCEDURE — 36415 COLL VENOUS BLD VENIPUNCTURE: CPT

## 2020-06-13 PROCEDURE — 85025 COMPLETE CBC W/AUTO DIFF WBC: CPT

## 2020-06-13 NOTE — NUR
REPORT RECEIVED. PT STATES PAIN LEVEL DECREASED, MEDICATIONS EFFECTIVE. PT 
REMAINS ON MONITORS, VSS. AWAITING ALL RESULTS. CALL LIGHT IN REACH, CONT TO 
MONITOR.

## 2020-06-24 ENCOUNTER — HOSPITAL ENCOUNTER (OUTPATIENT)
Dept: LAB | Facility: MEDICAL CENTER | Age: 60
End: 2020-06-24
Attending: UROLOGY
Payer: COMMERCIAL

## 2020-06-24 PROCEDURE — 82365 CALCULUS SPECTROSCOPY: CPT

## 2020-06-29 LAB
APPEARANCE STONE: NORMAL
COMPN STONE: NORMAL
NUMBER STONE: 1
SIZE STONE: NORMAL MM
SPECIMEN WT: 39 MG

## 2021-03-15 DIAGNOSIS — Z23 NEED FOR VACCINATION: ICD-10-CM

## 2021-03-19 ENCOUNTER — IMMUNIZATION (OUTPATIENT)
Dept: FAMILY PLANNING/WOMEN'S HEALTH CLINIC | Facility: IMMUNIZATION CENTER | Age: 61
End: 2021-03-19
Attending: INTERNAL MEDICINE
Payer: COMMERCIAL

## 2021-03-19 DIAGNOSIS — Z23 NEED FOR VACCINATION: ICD-10-CM

## 2021-03-19 DIAGNOSIS — Z23 ENCOUNTER FOR VACCINATION: Primary | ICD-10-CM

## 2021-03-19 PROCEDURE — 91301 MODERNA SARS-COV-2 VACCINE: CPT

## 2021-03-19 PROCEDURE — 0011A MODERNA SARS-COV-2 VACCINE: CPT

## 2021-04-17 ENCOUNTER — IMMUNIZATION (OUTPATIENT)
Dept: FAMILY PLANNING/WOMEN'S HEALTH CLINIC | Facility: IMMUNIZATION CENTER | Age: 61
End: 2021-04-17
Attending: INTERNAL MEDICINE
Payer: COMMERCIAL

## 2021-04-17 DIAGNOSIS — Z23 ENCOUNTER FOR VACCINATION: Primary | ICD-10-CM

## 2021-04-17 PROCEDURE — 0012A MODERNA SARS-COV-2 VACCINE: CPT

## 2021-04-17 PROCEDURE — 91301 MODERNA SARS-COV-2 VACCINE: CPT

## 2022-02-15 ENCOUNTER — APPOINTMENT (OUTPATIENT)
Dept: MEDICAL GROUP | Facility: MEDICAL CENTER | Age: 62
End: 2022-02-15

## 2022-02-15 RX ORDER — DAPSONE 50 MG/G
1 GEL TOPICAL
COMMUNITY
Start: 2021-10-30 | End: 2022-09-07

## 2022-02-15 RX ORDER — SIMVASTATIN 5 MG
5 TABLET ORAL NIGHTLY
COMMUNITY
Start: 2021-10-21

## 2022-02-15 RX ORDER — DOXYCYCLINE HYCLATE 100 MG/1
100 CAPSULE ORAL
COMMUNITY
Start: 2021-10-30 | End: 2022-09-07

## 2022-02-15 RX ORDER — FLUOCINONIDE TOPICAL SOLUTION USP, 0.05% 0.5 MG/ML
SOLUTION TOPICAL
COMMUNITY
Start: 2021-12-08 | End: 2022-09-07

## 2022-02-15 RX ORDER — FREMANEZUMAB-VFRM 225 MG/1.5ML
225 INJECTION SUBCUTANEOUS
COMMUNITY
Start: 2021-11-10 | End: 2022-10-05

## 2022-05-05 ENCOUNTER — HOSPITAL ENCOUNTER (EMERGENCY)
Facility: MEDICAL CENTER | Age: 62
End: 2022-05-05
Attending: EMERGENCY MEDICINE
Payer: MEDICAID

## 2022-05-05 VITALS
WEIGHT: 180.34 LBS | RESPIRATION RATE: 16 BRPM | OXYGEN SATURATION: 100 % | HEIGHT: 71 IN | BODY MASS INDEX: 25.25 KG/M2 | TEMPERATURE: 98.2 F | DIASTOLIC BLOOD PRESSURE: 100 MMHG | SYSTOLIC BLOOD PRESSURE: 139 MMHG | HEART RATE: 68 BPM

## 2022-05-05 DIAGNOSIS — F41.9 ANXIETY: ICD-10-CM

## 2022-05-05 DIAGNOSIS — F41.0 PANIC ATTACK: ICD-10-CM

## 2022-05-05 LAB — POC BREATHALIZER: 0 PERCENT (ref 0–0.01)

## 2022-05-05 PROCEDURE — 99284 EMERGENCY DEPT VISIT MOD MDM: CPT

## 2022-05-05 PROCEDURE — A9270 NON-COVERED ITEM OR SERVICE: HCPCS | Performed by: EMERGENCY MEDICINE

## 2022-05-05 PROCEDURE — 700102 HCHG RX REV CODE 250 W/ 637 OVERRIDE(OP): Performed by: EMERGENCY MEDICINE

## 2022-05-05 PROCEDURE — 302970 POC BREATHALIZER: Performed by: EMERGENCY MEDICINE

## 2022-05-05 RX ORDER — LORAZEPAM 1 MG/1
1 TABLET ORAL ONCE
Status: COMPLETED | OUTPATIENT
Start: 2022-05-05 | End: 2022-05-05

## 2022-05-05 RX ADMIN — LORAZEPAM 1 MG: 1 TABLET ORAL at 18:14

## 2022-05-06 NOTE — ED PROVIDER NOTES
ED Provider Note        Primary care provider: No primary care provider on file.    I verified that the patient was wearing a mask and I was wearing appropriate PPE every time I entered the room. The patient's mask was on the patient at all times during my encounter except for a brief view of the oropharynx.      CHIEF COMPLAINT  Chief Complaint   Patient presents with   • Anxiety     Pt walk-in by self. Pt states he took a road trip to [place] and last Friday at 03:00 in the hotel room he had severe anxiety and it has persisted since. Pt states Hx of anxiety/depression but weaned himself off the Rx in 2015. Pt went to urgent care in [place] on 5/2 and they Rx hydroxyzine, he has taken 5 of them so far but they are not helping.       HPI  Miguel Angel Fernández is a 62 y.o. male who presents to the Emergency Department with chief complaint of anxiety.  Patient stated that he had chronic anxiety for multiple years was on chronic medication till he weaned himself off 7 years prior.  States he has been doing well up until last Friday night 6 nights prior.  Patient stated that he was up late.  He states that he has not been sexually active with another human being for many many years however he does commonly snort amyl nitrates and masturbate.  Stated that he was doing this and shortly thereafter had profound onset of panic attack.  Palpitation shortness of breath feeling as though he was going to die.  States that he was at a hotel and [place] at that time.  He managed to calm himself down enough to drive to his sister's house in [place].  He was evaluated at an urgent treatment center near her house.  Patient states that he had complete work-up there that which was negative was placed on hydroxyzine and instructed to follow-up.  He states that he is taken 5 the hydroxyzine and had no relief of his symptoms.  He states that he does not want to harm himself at this time however his severe panic is causing horrible  "thoughts.  He gives me no examples of these horrible thoughts.  He no hallucinations that he does no desire to harm anyone else he denies any other drug use.  He currently is having palpitations and does feel as though he is experiencing panic he has minor nausea no emesis no cough congestion no chest pain no abdominal pain he states chronic constipation that he takes laxatives for.  He also took several \"calm\" Gummies yesterday these are a magnesium supplement and he has had multiple loose stools today.    REVIEW OF SYSTEMS  10 systems reviewed and otherwise negative, pertinent positives and negatives listed in the history of present illness.    PAST MEDICAL HISTORY   has a past medical history of Anxiety, Bowel habit changes, Chickenpox (as child), Closed head injury (2004), Cold (10/25/2018), Depression, Dermatitis, Diabetes ([facility]), Elevated alkaline phosphatase level (12/5/2018), Frequent headaches (1/26/2016), GERD (gastroesophageal reflux disease), Yi measles (as child), Heart burn, Hiatus hernia syndrome, Hyperglycemia (1/26/2016), Hypertension, Incomplete tear of right rotator cuff (10/8/2018), Indigestion, Kidney stone, Major depressive disorder with single episode, in full remission ([facility]) (11/22/2010), Psychiatric problem, Right shoulder pain (10/25/2018), Rosacea (1/26/2016), Sleep apnea, and Snoring.    SURGICAL HISTORY   has a past surgical history that includes orif, elbow (Left, 10/2004); repair, knee, acl (Left, 08/2002); septoturbinoplasty (8/17/2010); ventral hernia repair (9/28/2016); meniscectomy (Right, 2010); hip arthroplasty total (Left, 12/30/2016); eye surgery (Right, 2008); testicle exploration (1972); cystoscopy (04/2010); dental extraction(s) (1986); colonoscopy (06/2018); shoulder decompression arthroscopic (Right, 11/1/2018); and clavicle distal excision (Right, 11/1/2018).      SOCIAL HISTORY  Social History     Tobacco Use   • Smoking status: Former Smoker     Packs/day: 1.00     " "Years: 5.00     Pack years: 5.00     Types: Cigarettes     Quit date: 1982     Years since quittin.3   • Smokeless tobacco: Never Used   • Tobacco comment: quit 35 years ago   Vaping Use   • Vaping Use: Never used   Substance Use Topics   • Alcohol use: Not Currently     Alcohol/week: 0.0 oz     Comment: rare    • Drug use: Not Currently      Social History     Substance and Sexual Activity   Drug Use Not Currently       FAMILY HISTORY  Non-Contributory    CURRENT MEDICATIONS  Home Medications     Reviewed by Ansley Carolina R.N. (Registered Nurse) on 22 at 1659  Med List Status: <None>   Medication Last Dose Status   Ascorbic Acid (VITAMIN C) 1000 MG Tab  Active   Aspirin-Acetaminophen-Caffeine (EXCEDRIN MIGRAINE PO)  Active   chlorhexidine (PERIDEX) 0.12 % Solution  Active   Cholecalciferol (VITAMIN D3) 5000 units Cap  Active   Cyanocobalamin (VITAMIN B-12) 1000 MCG Tab  Active   Dapsone 5 % Gel  Active   doxycycline (VIBRAMYCIN) 100 MG Cap  Active   fluocinonide (LIDEX) 0.05 % external solution  Active   Fremanezumab-vfrm (AJOVY) 225 MG/1.5ML Solution Auto-injector  Active   Glucosamine HCl 1500 MG Tab  Active   HYDROcodone-acetaminophen (NORCO) 5-325 MG Tab per tablet  Active   loratadine (CLARITIN) 10 MG Tab  Active   losartan (COZAAR) 100 MG Tab  Active   magnesium oxide (MAG-OX) 400 MG Tab  Active   metFORMIN (GLUCOPHAGE) 500 MG Tab  Active   omeprazole (PRILOSEC) 20 MG delayed-release capsule  Active   PARoxetine (PAXIL) 20 MG Tab  Active   Probiotic Product (PROBIOTIC-10 PO)  Active   sildenafil citrate (VIAGRA) 50 MG tablet  Active   simvastatin (ZOCOR) 5 MG Tab  Active   sumatriptan (IMITREX) 100 MG tablet  Active                ALLERGIES  No Known Allergies    PHYSICAL EXAM  VITAL SIGNS: /82   Pulse 86   Temp 37 °C (98.6 °F) (Temporal)   Resp (!) 88   Ht 1.791 m (5' 10.5\")   Wt 81.8 kg (180 lb 5.4 oz)   SpO2 93%   BMI 25.51 kg/m²   Pulse ox interpretation: I interpret this " pulse ox as normal.  Constitutional: Alert and oriented x 3, no acute distress  HEENT: Atraumatic normocephalic, pupils are equal round, extraocular movements are intact. The nares is clear, external ears are normal, mouth shows moist mucous membranes  Neck: no obvious JVD or tracheal deviation  Cardiovascular: Regular rate and rhythm no murmur rub or gallop   Thorax & Lungs: No respiratory distress, no wheezes rales or rhonchi, No chest tenderness.   GI: Soft nontender nondistended positive bowel sounds, no peritoneal signs  Skin: Warm dry no obvious acute rash or lesion  Musculoskeletal: Moving all extremities with normal range strength, no acute  deformity  Neurologic: Cranial nerves III through XII are grossly intact, no sensory deficit, no cerebellar dysfunction   Psychiatric: Anxious    DIAGNOSTIC STUDIES / PROCEDURES  LABS      Results for orders placed or performed during the hospital encounter of 05/05/22   POC Breathalizer   Result Value Ref Range    POC Breathalizer 0 0.00 - 0.01 Percent       All labs reviewed by me.        COURSE & MEDICAL DECISION MAKING  Pertinent Labs & Imaging studies reviewed. (See chart for details)    5:58 PM - Patient seen and examined at bedside. Care everywhere from recent evaluation in urgent treatment center was evaluated.  Patient had an undetectable troponin he had a nonischemic EKG and a normal chest x-ray normal renal function panel.  I do not feel inclined to repeat those studies today as this does seem to be anxiety/panic driven.  We will perform a drug and alcohol screen.  We will have the patient evaluated by behavioral health life skills.  We will give the patient 1 Ativan tablet in the meantime.        Medical Decision Making: Patient been seen and evaluated by behavioral health he was given 1 mg of Ativan.  He reports complete resolution of symptoms after Ativan.  He has no suicidal ideation no homicidal ideation he has been given appropriate psychiatric  "resources.  Patient is able to demonstrate capacity forward thinking he is able to contract for safety.  Patient will contact behavioral health resources tomorrow morning.  Patient has multiple hydroxyzine left from his previous visit that I have instructed him he has able to take before bed this evening and as necessary tomorrow for anxiety.  I do not think that there is appropriate concern to find any further prescriptions at this time.  Patient will return here should he have any worsening anxiety panic thoughts of harming himself or anyone else any other acute symptoms changes or concerns otherwise discharged in stable and improved condition.    /82   Pulse 86   Temp 37 °C (98.6 °F) (Temporal)   Resp (!) 88   Ht 1.791 m (5' 10.5\")   Wt 81.8 kg (180 lb 5.4 oz)   SpO2 93%   BMI 25.51 kg/m²     Psychiatry Resources  [facility]  Schedule an appointment as soon as possible for a visit       [facility], Emergency Dept  [address]  [place] [id number] [phone]    in 12-24 hours if symptoms persist, immediately If symptoms worsen, or if you develop any other symptoms or concerns        FINAL IMPRESSION  1. Anxiety Active   2. Panic attack Active          This dictation has been created using voice recognition software and/or scribes. The accuracy of the dictation is limited by the abilities of the software and the expertise of the scribes. I expect there may be some errors of grammar and possibly content. I made every attempt to manually correct the errors within my dictation. However, errors related to voice recognition software and/or scribes may still exist and should be interpreted within the appropriate context.            "

## 2022-05-06 NOTE — DISCHARGE PLANNING
"ALERT team  note:  62 year old male BIB self earlier today d/t c/o increased anxiety and \"panic\"; voluntary pt; states he woke up today and had racing thoughts, \"terreible thoughts, heart racing\" and passing \"impending doom\"; received Ativan 1 mg PO today at 1814 which pt states was very effective to decrease his anxiety; with recent ED visit at [facility] in [place], [place] 5/2/22 while on a trip for similar complaint of anxieyt; pt received a RX for Vistaril 25 mg PO PRN but states it was not effective for decreasing his anxiety, it made him drowsy; pt alert, oriented x 4; calm; cooperative; pleasant; with organized thoughts and behaviors; no delusions, paranoia, hallucinations noted; insight, judgment adequate; currently denies SI, HI, or self-harm ideation; future-oriented; denies current outpt MH providers but states his PCP sent a MH referral for him today but pt does not know who it was sent to; denies current psych meds other than the Vistaril RX he received 5/2/22; denies h/o inpt MH/CD tx; denies h/o self-harm or SA; writer RN reviewed community MH resources with  pt, with written information given, including [facility], [facility], and [facility] transportation included in pt's insurance plan; with pt verbal consent, writer RN to send pt referral to [facility]; pt verbalized understanding and states he will f/u with [facility] tomorrow; writer RN updated [facility] ERP Dr. Del Castillo; pt to DC to self tonight to home    "

## 2022-05-12 NOTE — OR NURSING
"Pre-admit appointment completed. \"Preparing for your procedure\" sheet given to pt along with verbal and written instructions. Pt instructed to continue regularly prescribed medications through the day before surgery. Pt instructed to take the following medications the day of surgery with a sip of water, per anesthesia protocol;     Pt to bring CPAP DOS.    Pt to inform Dr Thompson of C/O some laryngitis and sore throat in past couple days. Denies fevers.   " This is a 28 year old       who presents for a post partum visit. She complains of nothing. She currently is breastfeeding exclusively. She has been sleeping well. Her mood/affect is described as doing well without problems.   . I have discussed postpartum contraception with the patient including progestin-only oral contraceptive, Mirena IUD and Nexplanon.    Physical Exam  Blood pressure 134/82, height 5' 5\" (1.651 m), weight 77.5 kg (170 lb 13.7 oz), last menstrual period 2021, currently breastfeeding.  General: Well developed, well nourished, in no acute distress  Head: Normocephalic and atraumatic   Breast: No masses. No discharge  Abdomen: Abdomen soft and non-tender without masses  Neurologic: No focal deficits   Skin: intact without lesions or rashes  Inguinal nodes: no significant adenopathy   Psych: alert and cooperative; normal mood and affect; normal attention span and concentration      Pelvic Exam:  External genitalia: normal appearance, no lesions or discharge  Vagina: normal appearing without lesions or discharge  Cervix: no cervical motion tenderness  Uterus: mobile, non-tender, no masses  Uterine size:  normal  Adnexa: no masses, non-tender: normal    Impression:  Normal postpartum exam  Desires to proceed with Mirena IUD.  Plan:  Recommend making an appointment for insertion.

## 2022-05-13 ENCOUNTER — HOSPITAL ENCOUNTER (EMERGENCY)
Facility: MEDICAL CENTER | Age: 62
End: 2022-05-13
Attending: EMERGENCY MEDICINE
Payer: MEDICAID

## 2022-05-13 VITALS
BODY MASS INDEX: 25.85 KG/M2 | DIASTOLIC BLOOD PRESSURE: 77 MMHG | HEART RATE: 67 BPM | OXYGEN SATURATION: 96 % | TEMPERATURE: 97.3 F | WEIGHT: 180.56 LBS | HEIGHT: 70 IN | RESPIRATION RATE: 16 BRPM | SYSTOLIC BLOOD PRESSURE: 123 MMHG

## 2022-05-13 DIAGNOSIS — F41.1 ANXIETY REACTION: ICD-10-CM

## 2022-05-13 PROCEDURE — 99283 EMERGENCY DEPT VISIT LOW MDM: CPT

## 2022-05-13 PROCEDURE — 700102 HCHG RX REV CODE 250 W/ 637 OVERRIDE(OP): Performed by: EMERGENCY MEDICINE

## 2022-05-13 PROCEDURE — A9270 NON-COVERED ITEM OR SERVICE: HCPCS | Performed by: EMERGENCY MEDICINE

## 2022-05-13 RX ORDER — LORAZEPAM 1 MG/1
1 TABLET ORAL EVERY 4 HOURS PRN
Qty: 20 TABLET | Refills: 0 | Status: SHIPPED | OUTPATIENT
Start: 2022-05-13 | End: 2022-05-20

## 2022-05-13 RX ORDER — LORAZEPAM 1 MG/1
1 TABLET ORAL EVERY 4 HOURS PRN
Qty: 20 TABLET | Refills: 0 | Status: SHIPPED | OUTPATIENT
Start: 2022-05-13 | End: 2022-05-13 | Stop reason: SDUPTHER

## 2022-05-13 RX ORDER — LORAZEPAM 1 MG/1
1 TABLET ORAL ONCE
Status: COMPLETED | OUTPATIENT
Start: 2022-05-13 | End: 2022-05-13

## 2022-05-13 RX ADMIN — LORAZEPAM 1 MG: 1 TABLET ORAL at 10:43

## 2022-05-13 NOTE — ED PROVIDER NOTES
ED Provider Note    ED Provider Note    Scribed for Johnathon Vega MD by Johnathon Vega M.D.. 5/13/2022, 10:40 AM.    Primary care provider: Pcp Pt States None  Means of arrival: Private  History obtained from: Patient  History limited by: None    CHIEF COMPLAINT  Chief Complaint   Patient presents with   • Anxiety   • Panic Attack     Pt has had HX of panic attacks in 2004 after an injury. Had another episode about a week ago and was started on paxil but states it's not working yet. Sates he was given ativan and that made him feel much better but he was not prescribed it for home.        HPI  Miguel Angel Fernández is a 62 y.o. male who presents to the Emergency Department for evaluation of acute anxiety.  Patient notes similar symptoms starting after an injury in 2004.  Symptoms seem to be episodic, he has used hydroxyzine with no improvement.  Patient thankfully had unremarkable cardiac [place].  He notes sensation of anxiety, panic, worse when he is in a confined space.  Patient notes he was at a piano recital yesterday and again felt his heart pounding and noted that he had to leave the room.  He does have a depressed mood along with it but notes no suicidality.  Patiently recently started on paroxetine by outpatient provider, he is only taken about 3 days of it so far, notes no improvement but relates he was told to take some time to take effect.  Unfortunately cannot be seen until July in the outpatient setting, as such she came to be assessed.    REVIEW OF SYSTEMS  Pertinent positives include acute anxiety with panic and depressed mood. Pertinent negatives include no syncope, no chest pain, no left arm pain, no acute dyspnea.      PAST MEDICAL HISTORY   has a past medical history of Anxiety, Bowel habit changes, Chickenpox (as child), Closed head injury (2004), Cold (10/25/2018), Depression, Dermatitis, Diabetes (HCC), Elevated alkaline phosphatase level (12/5/2018),  "Frequent headaches (2016), GERD (gastroesophageal reflux disease), Hungarian measles (as child), Heart burn, Hiatus hernia syndrome, Hyperglycemia (2016), Hypertension, Incomplete tear of right rotator cuff (10/8/2018), Indigestion, Kidney stone, Major depressive disorder with single episode, in full remission (HCC) (2010), Psychiatric problem, Right shoulder pain (10/25/2018), Rosacea (2016), Sleep apnea, and Snoring.    SURGICAL HISTORY   has a past surgical history that includes orif, elbow (Left, 10/2004); repair, knee, acl (Left, 2002); septoturbinoplasty (2010); ventral hernia repair (2016); meniscectomy (Right, ); hip arthroplasty total (Left, 2016); eye surgery (Right, ); testicle exploration (); cystoscopy (2010); dental extraction(s) (); colonoscopy (2018); shoulder decompression arthroscopic (Right, 2018); and clavicle distal excision (Right, 2018).    SOCIAL HISTORY  Social History     Tobacco Use   • Smoking status: Former Smoker     Packs/day: 1.00     Years: 5.00     Pack years: 5.00     Types: Cigarettes     Quit date: 1982     Years since quittin.3   • Smokeless tobacco: Never Used   • Tobacco comment: quit 35 years ago   Vaping Use   • Vaping Use: Never used   Substance Use Topics   • Alcohol use: Not Currently     Alcohol/week: 0.0 oz     Comment: rare    • Drug use: Not Currently      Social History     Substance and Sexual Activity   Drug Use Not Currently       FAMILY HISTORY  Family History   Problem Relation Age of Onset   • Diabetes Father    • Heart Attack Father 60   • Cancer Neg Hx    • Suicide Attempts Neg Hx        CURRENT MEDICATIONS  Home Medications    **Home medications have not yet been reviewed for this encounter**         ALLERGIES  No Known Allergies    PHYSICAL EXAM  VITAL SIGNS: /87   Pulse 69   Temp 36.5 °C (97.7 °F) (Temporal)   Resp 18   Ht 1.778 m (5' 10\")   Wt 81.9 kg (180 lb 8.9 oz)  " " SpO2 96%   BMI 25.91 kg/m²     General: Alert, no acute distress, appears anxious  Skin: Warm, dry, normal for ethnicity  Head: Normocephalic, atraumatic  Neck: Trachea midline, no tenderness  Eye: PERRL, normal conjunctiva  ENMT: Oral mucosa moist, no pharyngeal erythema or exudate  Cardiovascular: Regular rate and rhythm, No murmur, Normal peripheral perfusion  Respiratory: Lungs CTA, respirations are non-labored, breath sounds are equal  Musculoskeletal: No swelling, no deformity  Neurological: Alert and oriented to person, place, time, and situation.  Cranial nerves II through XII are grossly intact.  Upper and lower extremity strength and sensation are 5 x 5 and symmetrical bilaterally.  He has no pronator drift.  Lymphatics: No lymphadenopathy  Psychiatric: Cooperative, very anxious and mildly depressed mood, mood congruent affect        COURSE & MEDICAL DECISION MAKING  Pertinent Labs & Imaging studies reviewed. (See chart for details)    10:40 AM - Patient seen and examined at bedside. Patient will be treated with lorazepam.The differential diagnoses include but are not limited to: Acute anxiety, depression, generalized anxiety disorder, panic attack    Patient Vitals for the past 24 hrs:   BP Temp Temp src Pulse Resp SpO2 Height Weight   05/13/22 0956 117/87 36.5 °C (97.7 °F) Temporal 69 18 96 % 1.778 m (5' 10\") 81.9 kg (180 lb 8.9 oz)         Decision Making:  This is a 62 y.o. year old male who presents with worsening anxiety with panic attack that occurred yesterday.  He is anxious and depressed but not suicidal and demonstrates unremarkable neurologic exam with NIH stroke scale of 0.  He describes a recent unremarkable cardiac work-up last month, regular rate and rhythm and otherwise benign exam.  Given his persistent severe anxiety despite using hydroxyzine and paroxetine in the outpatient setting I do think a brief course of the benzodiazepine is not contraindicated.  Reviewed  aware, no " suspicious activity and as such no contraindication to brief course of benzodiazepines.  I did tell the patient these are potentially addictive and he will need to follow-up with outpatient providers for further assessment and management of his anxiety.    I reviewed prescription monitoring program for patient's narcotic use before prescribing a scheduled drug.The patient will not drink alcohol nor drive with prescribed medications      In prescribing controlled substances to this patient, I certify that I have obtained and reviewed the medical history this patient I have also made a good amara effort to obtain applicable records from other providers who have treated the patient and records did not demonstrate any increased risk of substance abuse that would prevent me from prescribing controlled substances.     I have conducted a physical exam and documented it. I have reviewed Mr. Fernández’s prescription history as maintained by the [place] Prescription Monitoring Program.     I have assessed the patient’s risk for abuse, dependency, and addiction using the validated Opioid Risk Tool available at https://www.mdcalc.com/sxoqnl-ptzz-rvpc-ort-narcotic-abuse.     Given the above, I believe the benefits of controlled substance therapy outweigh the risks. The reasons for prescribing controlled substances include in my professional opinion, controlled substances are a reasonable choice for this patient. Accordingly, I have discussed the risk and benefits, treatment plan, and alternative therapies with the patient. The patient has been consented for the medication and understands the risks.      I reviewed prescription monitoring program for patient's narcotic use before prescribing a scheduled drug.The patient will not drink alcohol nor drive with prescribed medications. The patient will return for new or worsening symptoms and is stable at the time of discharge.      DISPOSITION:  Patient will be discharged home in stable  condition.    FOLLOW UP:  [facility] [address]  [place] [address]  [phone]  Schedule an appointment as soon as possible for a visit         OUTPATIENT MEDICATIONS:  Current Discharge Medication List      START taking these medications    Details   LORazepam (ATIVAN) 1 MG Tab Take 1 Tablet by mouth every four hours as needed for Anxiety for up to 7 days.  Qty: 20 Tablet, Refills: 0    Associated Diagnoses: Anxiety reaction               FINAL IMPRESSION  1. Anxiety reaction          Johnathon ESTRADA M.D. (Scribe), am scribing for, and in the presence of, Johnathon Vega MD.    Electronically signed by: Johnathon Vega M.D. (Scribe), 5/13/2022    Johnathon ESTRADA MD personally performed the services described in this documentation, as scribed by Johnathon Vega M.D. in my presence, and it is both accurate and complete    The note accurately reflects work and decisions made by me.  Johnathon Vega M.D.  5/13/2022  10:44 AM

## 2022-05-13 NOTE — ED TRIAGE NOTES
"Chief Complaint   Patient presents with   • Anxiety   • Panic Attack     Pt has had HX of panic attacks in 2004 after an injury. Had another episode about a week ago and was started on paxil but states it's not working yet. Sates he was given ativan and that made him feel much better but he was not prescribed it for home.        /87   Pulse 69   Temp 36.5 °C (97.7 °F) (Temporal)   Resp 18   Ht 1.778 m (5' 10\")   Wt 81.9 kg (180 lb 8.9 oz)   SpO2 96%   BMI 25.91 kg/m²     "

## 2022-05-13 NOTE — ED NOTES
Miguel Angel Fernández D/C'd.  Discharge instructions provided to pt.  Pt states understanding.  Pt states all questions have been answered.  Copy of discharge provided to pt.  Signed copy in chart.  Prescription for lorazapam provided to pt. PT ambulatory out of department without difficulty.

## 2022-09-07 ENCOUNTER — PRE-ADMISSION TESTING (OUTPATIENT)
Dept: ADMISSIONS | Facility: MEDICAL CENTER | Age: 62
End: 2022-09-07
Attending: UROLOGY
Payer: MEDICAID

## 2022-09-07 DIAGNOSIS — Z01.812 PRE-OPERATIVE LABORATORY EXAMINATION: ICD-10-CM

## 2022-09-07 DIAGNOSIS — Z01.810 PRE-OPERATIVE CARDIOVASCULAR EXAMINATION: ICD-10-CM

## 2022-09-07 LAB
ANION GAP SERPL CALC-SCNC: 9 MMOL/L (ref 7–16)
APPEARANCE UR: CLEAR
BACTERIA #/AREA URNS HPF: NEGATIVE /HPF
BASOPHILS # BLD AUTO: 0.8 % (ref 0–1.8)
BASOPHILS # BLD: 0.05 K/UL (ref 0–0.12)
BILIRUB UR QL STRIP.AUTO: NEGATIVE
BUN SERPL-MCNC: 20 MG/DL (ref 8–22)
CALCIUM SERPL-MCNC: 9.8 MG/DL (ref 8.5–10.5)
CHLORIDE SERPL-SCNC: 105 MMOL/L (ref 96–112)
CO2 SERPL-SCNC: 28 MMOL/L (ref 20–33)
COLOR UR: YELLOW
CREAT SERPL-MCNC: 1.06 MG/DL (ref 0.5–1.4)
EKG IMPRESSION: NORMAL
EOSINOPHIL # BLD AUTO: 0.12 K/UL (ref 0–0.51)
EOSINOPHIL NFR BLD: 1.9 % (ref 0–6.9)
EPI CELLS #/AREA URNS HPF: NEGATIVE /HPF
ERYTHROCYTE [DISTWIDTH] IN BLOOD BY AUTOMATED COUNT: 42.9 FL (ref 35.9–50)
EST. AVERAGE GLUCOSE BLD GHB EST-MCNC: 120 MG/DL
GFR SERPLBLD CREATININE-BSD FMLA CKD-EPI: 78 ML/MIN/1.73 M 2
GLUCOSE SERPL-MCNC: 99 MG/DL (ref 65–99)
GLUCOSE UR STRIP.AUTO-MCNC: NEGATIVE MG/DL
HBA1C MFR BLD: 5.8 % (ref 4–5.6)
HCT VFR BLD AUTO: 44.9 % (ref 42–52)
HGB BLD-MCNC: 15 G/DL (ref 14–18)
HYALINE CASTS #/AREA URNS LPF: ABNORMAL /LPF
IMM GRANULOCYTES # BLD AUTO: 0.03 K/UL (ref 0–0.11)
IMM GRANULOCYTES NFR BLD AUTO: 0.5 % (ref 0–0.9)
INR PPP: 1.12 (ref 0.87–1.13)
KETONES UR STRIP.AUTO-MCNC: NEGATIVE MG/DL
LEUKOCYTE ESTERASE UR QL STRIP.AUTO: ABNORMAL
LYMPHOCYTES # BLD AUTO: 1.57 K/UL (ref 1–4.8)
LYMPHOCYTES NFR BLD: 24.5 % (ref 22–41)
MCH RBC QN AUTO: 30.9 PG (ref 27–33)
MCHC RBC AUTO-ENTMCNC: 33.4 G/DL (ref 33.7–35.3)
MCV RBC AUTO: 92.4 FL (ref 81.4–97.8)
MICRO URNS: ABNORMAL
MONOCYTES # BLD AUTO: 0.55 K/UL (ref 0–0.85)
MONOCYTES NFR BLD AUTO: 8.6 % (ref 0–13.4)
NEUTROPHILS # BLD AUTO: 4.08 K/UL (ref 1.82–7.42)
NEUTROPHILS NFR BLD: 63.7 % (ref 44–72)
NITRITE UR QL STRIP.AUTO: NEGATIVE
NRBC # BLD AUTO: 0 K/UL
NRBC BLD-RTO: 0 /100 WBC
PH UR STRIP.AUTO: 7 [PH] (ref 5–8)
PLATELET # BLD AUTO: 183 K/UL (ref 164–446)
PMV BLD AUTO: 11.5 FL (ref 9–12.9)
POTASSIUM SERPL-SCNC: 4.4 MMOL/L (ref 3.6–5.5)
PROT UR QL STRIP: NEGATIVE MG/DL
PROTHROMBIN TIME: 14.2 SEC (ref 12–14.6)
RBC # BLD AUTO: 4.86 M/UL (ref 4.7–6.1)
RBC # URNS HPF: ABNORMAL /HPF
RBC UR QL AUTO: ABNORMAL
SODIUM SERPL-SCNC: 142 MMOL/L (ref 135–145)
SP GR UR STRIP.AUTO: 1.02
UROBILINOGEN UR STRIP.AUTO-MCNC: 1 MG/DL
WBC # BLD AUTO: 6.4 K/UL (ref 4.8–10.8)
WBC #/AREA URNS HPF: ABNORMAL /HPF

## 2022-09-07 PROCEDURE — 81001 URINALYSIS AUTO W/SCOPE: CPT

## 2022-09-07 PROCEDURE — 83036 HEMOGLOBIN GLYCOSYLATED A1C: CPT

## 2022-09-07 PROCEDURE — 85610 PROTHROMBIN TIME: CPT

## 2022-09-07 PROCEDURE — 80048 BASIC METABOLIC PNL TOTAL CA: CPT

## 2022-09-07 PROCEDURE — 36415 COLL VENOUS BLD VENIPUNCTURE: CPT

## 2022-09-07 PROCEDURE — 93010 ELECTROCARDIOGRAM REPORT: CPT | Performed by: INTERNAL MEDICINE

## 2022-09-07 PROCEDURE — 87086 URINE CULTURE/COLONY COUNT: CPT

## 2022-09-07 PROCEDURE — 85025 COMPLETE CBC W/AUTO DIFF WBC: CPT

## 2022-09-07 PROCEDURE — 93005 ELECTROCARDIOGRAM TRACING: CPT

## 2022-09-07 RX ORDER — VILAZODONE HYDROCHLORIDE 10 MG/1
10 TABLET ORAL DAILY
COMMUNITY
Start: 2022-08-29

## 2022-09-07 RX ORDER — LOSARTAN POTASSIUM 100 MG/1
100 TABLET ORAL
COMMUNITY
Start: 2022-07-27

## 2022-09-07 RX ORDER — HYDROXYZINE HYDROCHLORIDE 25 MG/1
50 TABLET, FILM COATED ORAL
COMMUNITY
Start: 2022-08-04

## 2022-09-09 LAB
BACTERIA UR CULT: NORMAL
SIGNIFICANT IND 70042: NORMAL
SITE SITE: NORMAL
SOURCE SOURCE: NORMAL

## 2022-09-23 ENCOUNTER — HOSPITAL ENCOUNTER (OUTPATIENT)
Facility: MEDICAL CENTER | Age: 62
End: 2022-09-23
Attending: UROLOGY | Admitting: UROLOGY
Payer: MEDICAID

## 2022-09-23 ENCOUNTER — ANESTHESIA (OUTPATIENT)
Dept: SURGERY | Facility: MEDICAL CENTER | Age: 62
End: 2022-09-23
Payer: MEDICAID

## 2022-09-23 ENCOUNTER — ANESTHESIA EVENT (OUTPATIENT)
Dept: SURGERY | Facility: MEDICAL CENTER | Age: 62
End: 2022-09-23
Payer: MEDICAID

## 2022-09-23 VITALS
DIASTOLIC BLOOD PRESSURE: 84 MMHG | WEIGHT: 174.82 LBS | BODY MASS INDEX: 25.03 KG/M2 | TEMPERATURE: 97.6 F | HEART RATE: 84 BPM | HEIGHT: 70 IN | RESPIRATION RATE: 18 BRPM | OXYGEN SATURATION: 95 % | SYSTOLIC BLOOD PRESSURE: 125 MMHG

## 2022-09-23 LAB — GLUCOSE BLD STRIP.AUTO-MCNC: 96 MG/DL (ref 65–99)

## 2022-09-23 PROCEDURE — 700101 HCHG RX REV CODE 250: Performed by: ANESTHESIOLOGY

## 2022-09-23 PROCEDURE — 160039 HCHG SURGERY MINUTES - EA ADDL 1 MIN LEVEL 3: Performed by: UROLOGY

## 2022-09-23 PROCEDURE — 88341 IMHCHEM/IMCYTCHM EA ADD ANTB: CPT

## 2022-09-23 PROCEDURE — 160048 HCHG OR STATISTICAL LEVEL 1-5: Performed by: UROLOGY

## 2022-09-23 PROCEDURE — C1729 CATH, DRAINAGE: HCPCS | Performed by: UROLOGY

## 2022-09-23 PROCEDURE — 160028 HCHG SURGERY MINUTES - 1ST 30 MINS LEVEL 3: Performed by: UROLOGY

## 2022-09-23 PROCEDURE — 88342 IMHCHEM/IMCYTCHM 1ST ANTB: CPT

## 2022-09-23 PROCEDURE — 700101 HCHG RX REV CODE 250: Performed by: UROLOGY

## 2022-09-23 PROCEDURE — 82962 GLUCOSE BLOOD TEST: CPT

## 2022-09-23 PROCEDURE — 160046 HCHG PACU - 1ST 60 MINS PHASE II: Performed by: UROLOGY

## 2022-09-23 PROCEDURE — 160025 RECOVERY II MINUTES (STATS): Performed by: UROLOGY

## 2022-09-23 PROCEDURE — 700105 HCHG RX REV CODE 258: Performed by: UROLOGY

## 2022-09-23 PROCEDURE — 00926 ANES MALE GENT RAD ORCH ING: CPT | Performed by: ANESTHESIOLOGY

## 2022-09-23 PROCEDURE — 88305 TISSUE EXAM BY PATHOLOGIST: CPT

## 2022-09-23 PROCEDURE — 160009 HCHG ANES TIME/MIN: Performed by: UROLOGY

## 2022-09-23 PROCEDURE — 160035 HCHG PACU - 1ST 60 MINS PHASE I: Performed by: UROLOGY

## 2022-09-23 PROCEDURE — 700111 HCHG RX REV CODE 636 W/ 250 OVERRIDE (IP): Performed by: ANESTHESIOLOGY

## 2022-09-23 PROCEDURE — 160002 HCHG RECOVERY MINUTES (STAT): Performed by: UROLOGY

## 2022-09-23 RX ORDER — HYDROMORPHONE HYDROCHLORIDE 1 MG/ML
0.2 INJECTION, SOLUTION INTRAMUSCULAR; INTRAVENOUS; SUBCUTANEOUS
Status: DISCONTINUED | OUTPATIENT
Start: 2022-09-23 | End: 2022-09-23 | Stop reason: HOSPADM

## 2022-09-23 RX ORDER — LIDOCAINE HYDROCHLORIDE 20 MG/ML
INJECTION, SOLUTION EPIDURAL; INFILTRATION; INTRACAUDAL; PERINEURAL PRN
Status: DISCONTINUED | OUTPATIENT
Start: 2022-09-23 | End: 2022-09-23 | Stop reason: SURG

## 2022-09-23 RX ORDER — HYDROMORPHONE HYDROCHLORIDE 1 MG/ML
0.4 INJECTION, SOLUTION INTRAMUSCULAR; INTRAVENOUS; SUBCUTANEOUS
Status: DISCONTINUED | OUTPATIENT
Start: 2022-09-23 | End: 2022-09-23 | Stop reason: HOSPADM

## 2022-09-23 RX ORDER — ONDANSETRON 2 MG/ML
INJECTION INTRAMUSCULAR; INTRAVENOUS PRN
Status: DISCONTINUED | OUTPATIENT
Start: 2022-09-23 | End: 2022-09-23 | Stop reason: SURG

## 2022-09-23 RX ORDER — ONDANSETRON 2 MG/ML
4 INJECTION INTRAMUSCULAR; INTRAVENOUS
Status: DISCONTINUED | OUTPATIENT
Start: 2022-09-23 | End: 2022-09-23 | Stop reason: HOSPADM

## 2022-09-23 RX ORDER — METOPROLOL TARTRATE 1 MG/ML
1 INJECTION, SOLUTION INTRAVENOUS
Status: DISCONTINUED | OUTPATIENT
Start: 2022-09-23 | End: 2022-09-23 | Stop reason: HOSPADM

## 2022-09-23 RX ORDER — HALOPERIDOL 5 MG/ML
1 INJECTION INTRAMUSCULAR
Status: DISCONTINUED | OUTPATIENT
Start: 2022-09-23 | End: 2022-09-23 | Stop reason: HOSPADM

## 2022-09-23 RX ORDER — IPRATROPIUM BROMIDE AND ALBUTEROL SULFATE 2.5; .5 MG/3ML; MG/3ML
3 SOLUTION RESPIRATORY (INHALATION)
Status: DISCONTINUED | OUTPATIENT
Start: 2022-09-23 | End: 2022-09-23 | Stop reason: HOSPADM

## 2022-09-23 RX ORDER — OXYCODONE HCL 5 MG/5 ML
5 SOLUTION, ORAL ORAL
Status: DISCONTINUED | OUTPATIENT
Start: 2022-09-23 | End: 2022-09-23 | Stop reason: HOSPADM

## 2022-09-23 RX ORDER — MIDAZOLAM HYDROCHLORIDE 1 MG/ML
INJECTION INTRAMUSCULAR; INTRAVENOUS PRN
Status: DISCONTINUED | OUTPATIENT
Start: 2022-09-23 | End: 2022-09-23 | Stop reason: SURG

## 2022-09-23 RX ORDER — DEXAMETHASONE SODIUM PHOSPHATE 4 MG/ML
INJECTION, SOLUTION INTRA-ARTICULAR; INTRALESIONAL; INTRAMUSCULAR; INTRAVENOUS; SOFT TISSUE PRN
Status: DISCONTINUED | OUTPATIENT
Start: 2022-09-23 | End: 2022-09-23 | Stop reason: SURG

## 2022-09-23 RX ORDER — HYDROMORPHONE HYDROCHLORIDE 1 MG/ML
0.1 INJECTION, SOLUTION INTRAMUSCULAR; INTRAVENOUS; SUBCUTANEOUS
Status: DISCONTINUED | OUTPATIENT
Start: 2022-09-23 | End: 2022-09-23 | Stop reason: HOSPADM

## 2022-09-23 RX ORDER — BUPIVACAINE HYDROCHLORIDE AND EPINEPHRINE 5; 5 MG/ML; UG/ML
INJECTION, SOLUTION PERINEURAL
Status: DISCONTINUED | OUTPATIENT
Start: 2022-09-23 | End: 2022-09-23 | Stop reason: HOSPADM

## 2022-09-23 RX ORDER — DIPHENHYDRAMINE HYDROCHLORIDE 50 MG/ML
12.5 INJECTION INTRAMUSCULAR; INTRAVENOUS
Status: DISCONTINUED | OUTPATIENT
Start: 2022-09-23 | End: 2022-09-23 | Stop reason: HOSPADM

## 2022-09-23 RX ORDER — SODIUM CHLORIDE, SODIUM LACTATE, POTASSIUM CHLORIDE, CALCIUM CHLORIDE 600; 310; 30; 20 MG/100ML; MG/100ML; MG/100ML; MG/100ML
INJECTION, SOLUTION INTRAVENOUS CONTINUOUS
Status: DISCONTINUED | OUTPATIENT
Start: 2022-09-23 | End: 2022-09-23 | Stop reason: HOSPADM

## 2022-09-23 RX ORDER — GLYCOPYRROLATE 0.2 MG/ML
INJECTION INTRAMUSCULAR; INTRAVENOUS PRN
Status: DISCONTINUED | OUTPATIENT
Start: 2022-09-23 | End: 2022-09-23 | Stop reason: SURG

## 2022-09-23 RX ORDER — HYDRALAZINE HYDROCHLORIDE 20 MG/ML
5 INJECTION INTRAMUSCULAR; INTRAVENOUS
Status: DISCONTINUED | OUTPATIENT
Start: 2022-09-23 | End: 2022-09-23 | Stop reason: HOSPADM

## 2022-09-23 RX ORDER — OXYCODONE HCL 5 MG/5 ML
10 SOLUTION, ORAL ORAL
Status: DISCONTINUED | OUTPATIENT
Start: 2022-09-23 | End: 2022-09-23 | Stop reason: HOSPADM

## 2022-09-23 RX ORDER — CEFAZOLIN SODIUM 1 G/3ML
INJECTION, POWDER, FOR SOLUTION INTRAMUSCULAR; INTRAVENOUS PRN
Status: DISCONTINUED | OUTPATIENT
Start: 2022-09-23 | End: 2022-09-23 | Stop reason: SURG

## 2022-09-23 RX ADMIN — SODIUM CHLORIDE, POTASSIUM CHLORIDE, SODIUM LACTATE AND CALCIUM CHLORIDE: 600; 310; 30; 20 INJECTION, SOLUTION INTRAVENOUS at 13:09

## 2022-09-23 RX ADMIN — EPHEDRINE SULFATE 20 MG: 50 INJECTION, SOLUTION INTRAVENOUS at 14:31

## 2022-09-23 RX ADMIN — GLYCOPYRROLATE 0.2 MG: 0.2 INJECTION INTRAMUSCULAR; INTRAVENOUS at 14:45

## 2022-09-23 RX ADMIN — FENTANYL CITRATE 100 MCG: 50 INJECTION, SOLUTION INTRAMUSCULAR; INTRAVENOUS at 14:25

## 2022-09-23 RX ADMIN — LIDOCAINE HYDROCHLORIDE 0.5 ML: 10 INJECTION, SOLUTION EPIDURAL; INFILTRATION; INTRACAUDAL; PERINEURAL at 13:08

## 2022-09-23 RX ADMIN — ONDANSETRON 4 MG: 2 INJECTION INTRAMUSCULAR; INTRAVENOUS at 14:52

## 2022-09-23 RX ADMIN — LIDOCAINE HYDROCHLORIDE 100 MG: 20 INJECTION, SOLUTION EPIDURAL; INFILTRATION; INTRACAUDAL at 14:26

## 2022-09-23 RX ADMIN — EPHEDRINE SULFATE 10 MG: 50 INJECTION, SOLUTION INTRAVENOUS at 14:35

## 2022-09-23 RX ADMIN — PROPOFOL 200 MG: 10 INJECTION, EMULSION INTRAVENOUS at 14:26

## 2022-09-23 RX ADMIN — CEFAZOLIN 2 G: 330 INJECTION, POWDER, FOR SOLUTION INTRAMUSCULAR; INTRAVENOUS at 14:29

## 2022-09-23 RX ADMIN — DEXAMETHASONE SODIUM PHOSPHATE 4 MG: 4 INJECTION, SOLUTION INTRA-ARTICULAR; INTRALESIONAL; INTRAMUSCULAR; INTRAVENOUS; SOFT TISSUE at 14:52

## 2022-09-23 RX ADMIN — MIDAZOLAM HYDROCHLORIDE 2 MG: 1 INJECTION, SOLUTION INTRAMUSCULAR; INTRAVENOUS at 14:23

## 2022-09-23 ASSESSMENT — PAIN DESCRIPTION - PAIN TYPE
TYPE: SURGICAL PAIN

## 2022-09-23 ASSESSMENT — PAIN SCALES - GENERAL: PAIN_LEVEL: 0

## 2022-09-23 NOTE — ANESTHESIA POSTPROCEDURE EVALUATION
Patient: Miguel Angel Fernández    Procedure Summary     Date: 09/23/22 Room / Location: [place] [address] / SURGERY [facility]    Anesthesia Start: 1421 Anesthesia Stop: 1508    Procedure: LEFT SCROTAL ORCHIECTOMY FOR ATROPHIC TESTICLE AND PAIN (Left: Scrotum) Diagnosis: (testicular pain and atrophy)    Surgeons: Miguel Angel Lynn M.D. Responsible Provider: Trever Vaughn M.D.    Anesthesia Type: general ASA Status: 3          Final Anesthesia Type: general  Last vitals  BP   Blood Pressure: (!) 122/33    Temp   36.4 °C (97.5 °F)    Pulse   83   Resp   (!) 27    SpO2   96 %      Anesthesia Post Evaluation    Patient location during evaluation: PACU  Patient participation: complete - patient participated  Level of consciousness: awake and alert  Pain score: 0    Airway patency: patent  Anesthetic complications: no  Cardiovascular status: hemodynamically stable  Respiratory status: acceptable  Hydration status: euvolemic    PONV: none          There were no known notable events for this encounter.     Nurse Pain Score: 0 (NPRS)

## 2022-09-23 NOTE — ANESTHESIA TIME REPORT
Anesthesia Start and Stop Event Times     Date Time Event    9/23/2022 1421 Anesthesia Start     1508 Anesthesia Stop        Responsible Staff  09/23/22    Name Role Begin End    Trever Vaughn M.D. Anesth 1421 1508        Overtime Reason:  no overtime (within assigned shift)    Comments:

## 2022-09-23 NOTE — ANESTHESIA PREPROCEDURE EVALUATION
Case: [id number] Date/Time: 09/23/22 3799    Procedure: LEFT SCROTAL ORCHIECTOMY FOR ATROPHIC TESTICLE AND PAIN, INGUINAL APPROACH (Left)    Pre-op diagnosis: UNDESCENDED TESTICLE    Location: [facility] OR 12 / SURGERY [facility]    Surgeons: Miguel Angel Lynn M.D.          Relevant Problems   ANESTHESIA   (positive) FRANK on CPAP      NEURO   (positive) Cervicogenic headache   (positive) Chronic migraine   (positive) Frequent headaches   (positive) Medication overuse headache      CARDIAC   (positive) Chronic migraine   (positive) HTN (hypertension)      GI   (positive) Acid reflux      ENDO   (positive) Type 2 diabetes mellitus without complication, without long-term current use of insulin (HCC)       Physical Exam    Airway   Mallampati: II  TM distance: >3 FB  Neck ROM: full       Cardiovascular - normal exam  Rhythm: regular  Rate: normal  (-) murmur     Dental - normal exam           Pulmonary - normal exam  Breath sounds clear to auscultation     Abdominal    Neurological - normal exam                 Anesthesia Plan    ASA 3   ASA physical status 3 criteria: diabetes - poorly controlled    Plan - general       Airway plan will be LMA          Induction: intravenous    Postoperative Plan: Postoperative administration of opioids is intended.    Pertinent diagnostic labs and testing reviewed    Informed Consent:    Anesthetic plan and risks discussed with patient.    Use of blood products discussed with: patient whom consented to blood products.

## 2022-09-23 NOTE — OR NURSING
Arrived to PACU in stable condition. Site CDI and patient denies pain or nausea. Taking po well. Scripts were sent to [facility] in [facility]. He is stable and doing great.

## 2022-09-23 NOTE — OR NURSING
Preop complete. Bed in low position and call light in reach. BS 96. Iv placed and infusing. Instructed to all for any needs. All questions answered.

## 2022-09-23 NOTE — ANESTHESIA PROCEDURE NOTES
Airway    Date/Time: 9/23/2022 2:28 PM  Performed by: Trever Vaughn M.D.  Authorized by: Trever Vaughn M.D.     Location:  OR  Urgency:  Elective  Difficult Airway: No    Indications for Airway Management:  Anesthesia      Spontaneous Ventilation: absent    Sedation Level:  Deep  Preoxygenated: Yes    Patient Position:  Sniffing  Mask Difficulty Assessment:  1 - vent by mask  Final Airway Type:  Supraglottic airway  Final Supraglottic Airway:  Standard LMA    SGA Size:  5  Number of Attempts at Approach:  1

## 2022-09-23 NOTE — DISCHARGE INSTRUCTIONS
HOME CARE INSTRUCTIONS    ACTIVITY: Rest and take it easy for the first 24 hours.  A responsible adult is recommended to remain with you during that time.  It is normal to feel sleepy.  We encourage you to not do anything that requires balance, judgment or coordination.    FOR 24 HOURS DO NOT:  Drive, operate machinery or run household appliances.  Drink beer or alcoholic beverages.  Make important decisions or sign legal documents.    SPECIAL INSTRUCTIONS:   ADAT to regular diet.  Ice to scrotum 20 minutes on/20 minutes off for 2 days while awake.  Keep wounds clean and dry for 2 days then OK to shower.      DIET: To avoid nausea, slowly advance diet as tolerated, avoiding spicy or greasy foods for the first day.  Add more substantial food to your diet according to your physician's instructions.  Babies can be fed formula or breast milk as soon as they are hungry.  INCREASE FLUIDS AND FIBER TO AVOID CONSTIPATION.    SURGICAL DRESSING/BATHING: See Above    MEDICATIONS: Resume taking daily medication.  Take prescribed pain medication with food.  If no medication is prescribed, you may take non-aspirin pain medication if needed.  PAIN MEDICATION CAN BE VERY CONSTIPATING.  Take a stool softener or laxative such as senokot, pericolace, or milk of magnesia if needed.    Prescriptions are at [facility] per Dr Lynn    A follow-up appointment should be arranged with your doctor in 1-2 weeks; call to schedule.    You should CALL YOUR PHYSICIAN if you develop:  Fever greater than 101 degrees F.  Pain not relieved by medication, or persistent nausea or vomiting.  Excessive bleeding (blood soaking through dressing) or unexpected drainage from the wound.  Extreme redness or swelling around the incision site, drainage of pus or foul smelling drainage.  Inability to urinate or empty your bladder within 8 hours.  Problems with breathing or chest pain.    You should call 911 if you develop problems with breathing or chest  pain.  If you are unable to contact your doctor or surgical center, you should go to the nearest emergency room or urgent care center.  Physician's telephone #:     MILD FLU-LIKE SYMPTOMS ARE NORMAL.  YOU MAY EXPERIENCE GENERALIZED MUSCLE ACHES, THROAT IRRITATION, HEADACHE AND/OR SOME NAUSEA.    If any questions arise, call your doctor.  If your doctor is not available, please feel free to call the Surgical Center at [phone].  The Center is open Monday through Friday from 7AM to 7PM.      A registered nurse may call you a few days after your surgery to see how you are doing after your procedure.    You may also receive a survey in the mail within the next two weeks and we ask that you take a few moments to complete the survey and return it to us.  Our goal is to provide you with very good care and we value your comments.     Depression / Suicide Risk    As you are discharged from this [facility] facility, it is important to learn how to keep safe from harming yourself.    Recognize the warning signs:  Abrupt changes in personality, positive or negative- including increase in energy   Giving away possessions  Change in eating patterns- significant weight changes-  positive or negative  Change in sleeping patterns- unable to sleep or sleeping all the time   Unwillingness or inability to communicate  Depression  Unusual sadness, discouragement and loneliness  Talk of wanting to die  Neglect of personal appearance   Rebelliousness- reckless behavior  Withdrawal from people/activities they love  Confusion- inability to concentrate     If you or a loved one observes any of these behaviors or has concerns about self-harm, here's what you can do:  Talk about it- your feelings and reasons for harming yourself  Remove any means that you might use to hurt yourself (examples: pills, rope, extension cords, firearm)  Get professional help from the community (Mental Health, Substance Abuse, psychological counseling)  Do  not be alone:Call your Safe Contact- someone whom you trust who will be there for you.  Call your local CRISIS HOTLINE 768-5820 or 023-466-3495  Call your local Children's Mobile Crisis Response Team [place] (404) 707-6994 or www.Neozone  Call the toll free National Suicide Prevention Hotlines   National Suicide Prevention Lifeline 031-590-NYIE (7786)  [facility] Line Network 800-YRCKMYH (534-9018)    I acknowledge receipt and understanding of these Home Care instructions.

## 2022-09-23 NOTE — OR NURSING
Pt's VSS; denies N/V; denies pain. Dressing CDI to scrotal area. D/c orders received. IV dc'd. Pt changed into clothing with assistance. Pt up and ambulated to BR, steady gait, voided adequately. Discharge instructions given as well as pain management handout; pt and family verbalized understanding and questions answered. Patient states ready to d/c home. Prescriptions e-sent to preferred pharmacy. Pt dc'd in w/c with RN in stable condition.

## 2022-09-23 NOTE — OR SURGEON
Immediate Post OP Note    PreOp Diagnosis: Atrophic left testicle                               Chronic left testicular pain      PostOp Diagnosis: As above      Procedure(s):  LEFT SCROTAL ORCHIECTOMY- Wound Class: Clean    Surgeon(s):  Miguel Angel Lynn M.D.    Anesthesiologist/Type of Anesthesia:  Anesthesiologist: Trever Vaughn M.D./General LMA    Surgical Staff:  Circulator: Leny Munoz R.N.  Scrub Person: Evert Hendrix    Specimens removed if any:  ID Type Source Tests Collected by Time Destination   A : left testis Tissue Testis PATHOLOGY SPECIMEN Miguel Angel Lynn M.D. 9/23/2022  2:54 PM        Estimated Blood Loss: N/A    Findings: Small left testicle     Complications: None  Drains: None        9/23/2022 3:19 PM Miguel Angel Lynn M.D.

## 2022-09-24 NOTE — OP REPORT
DATE OF SERVICE:  09/23/2022     PREOPERATIVE DIAGNOSES:  1.  Left testicular atrophy.  2.  Chronic testicular pain with failed medical management.     OPERATIONS AND PROCEDURES PERFORMED:  Left simple orchiectomy with denervation   of cord.     SURGEON:  Miguel Angel Lynn MD     ANESTHESIA:  General laryngeal mask.     ANESTHESIOLOGIST:  Trever Vaughn MD     POSTOPERATIVE DIAGNOSES:  1.  Left testicular atrophy.  2.  Chronic testicular pain with failed medical management.     COMPLICATIONS:  None.     DRAINS:  None.     SPECIMENS:  Left testicle to pathology for permanent section.     INTRAOPERATIVE FINDINGS:  The patient has an atrophic left testicle.  No other   abnormalities identified on the epididymis or vas.     INDICATIONS:  The patient is a pleasant gentleman with a long history of   testicular atrophy and pain.  He has failed conservative management, has been   counseled as to the treatment options and on clinical exam has a very tender   left testicle.  I discussed with the patient the treatment alternatives and he   wishes to proceed with left simple orchiectomy.  I discussed the fact that   when I take down the cord, I would take down the neural tissue and the entire   cord for enterotomy as part of the procedure. We discussed the risk of the   procedure including but not limited to risk of wound infection, postoperative   pain, swelling, risk of hematoma, hydrocele in 1-2% of cases as well as   persistent pain occurring after this procedure.  I have also discussed the   perioperative risk of deep vein thrombosis, pulmonary embolism, aspiration   pneumonia, heart attack, stroke and death.  Informed consent was given to me   by the patient to proceed and he is marked on his right thigh with my initials   and the letter DH for safe site surgery.     DESCRIPTION OF PROCEDURE:  After informed consent was obtained, the patient   was brought to the operating room and placed supine.  A general laryngeal mask    anesthetic was administered in a balanced fashion.  The patient received   weight-based Ancef and the operative area was shaved, Betadine prepped and   draped in the usual sterile fashion.  A surgical timeout was called and all   members of the operative team agree as the patient's name, procedure to be   performed without objections, attention was directed to the procedure.     I began the procedure by making a transverse mid to upper scrotal incision   with a 15 blade scalpel.  I dissected through the external iliac, internal   spermatic fascia with cautery. We then mobilized the testicle and delivered   the testicle within the tunica vaginalis.  I proceeded to take down the   internal and external spermatic fascia isolating the cord.  Once the cord was   isolated, I took a right angle and took down all the neural tissue as high as   I could.  I also thinned out the cord.  The vas deferens was divided separate   from the cord and it was controlled with a 4-0 Vicryl suture on a RB1 needle.    The cord was subsequently clamped in the upper scrotum near the external ring   and was transected and the pathologic specimen was submitted to pathology.    At this point in time, a suture ligature of 2-0 PDS was placed and then a   second suture of PDS was positioned.  At the end of the case, hemostasis was   excellent.  The neural tissue and cord had been stripped and at this point in   time, attention was directed towards closure. I did inject the cord with   approximately 8 mL of 0.25% Marcaine without epinephrine and also the   subcutaneous tissue with 2 mL of Marcaine.  I then proceeded to close the   internal and external spermatic fascia with a 4-0 Vicryl and the skin was   closed with 3-0 chromic in a running locking fashion.  At the end of the case,   sponge, instrument and needle counts were correct x2.  A fluff dressing was   applied after Dermabond was applied to the scrotal wound and then athletic   supporter.   He was awakened in the operating room and transferred to recovery   room where he arrived in stable condition.        ______________________________  MD GONSALO Aviles/[facility]    DD:  09/23/2022 17:44  DT:  09/23/2022 18:04    Job#:  [id number]

## 2022-09-28 ENCOUNTER — APPOINTMENT (OUTPATIENT)
Dept: RADIOLOGY | Facility: MEDICAL CENTER | Age: 62
End: 2022-09-28
Attending: EMERGENCY MEDICINE
Payer: MEDICAID

## 2022-09-28 ENCOUNTER — HOSPITAL ENCOUNTER (EMERGENCY)
Facility: MEDICAL CENTER | Age: 62
End: 2022-09-28
Attending: EMERGENCY MEDICINE
Payer: MEDICAID

## 2022-09-28 VITALS
DIASTOLIC BLOOD PRESSURE: 93 MMHG | SYSTOLIC BLOOD PRESSURE: 136 MMHG | BODY MASS INDEX: 24.91 KG/M2 | HEIGHT: 70 IN | RESPIRATION RATE: 18 BRPM | TEMPERATURE: 95.9 F | OXYGEN SATURATION: 97 % | HEART RATE: 60 BPM | WEIGHT: 174 LBS

## 2022-09-28 DIAGNOSIS — R10.9 FLANK PAIN: ICD-10-CM

## 2022-09-28 DIAGNOSIS — N20.1 URETEROLITHIASIS: ICD-10-CM

## 2022-09-28 LAB
ALBUMIN SERPL BCP-MCNC: 4.6 G/DL (ref 3.2–4.9)
ALBUMIN/GLOB SERPL: 1.6 G/DL
ALP SERPL-CCNC: 104 U/L (ref 30–99)
ALT SERPL-CCNC: 15 U/L (ref 2–50)
ANION GAP SERPL CALC-SCNC: 11 MMOL/L (ref 7–16)
APPEARANCE UR: CLEAR
AST SERPL-CCNC: 19 U/L (ref 12–45)
BACTERIA #/AREA URNS HPF: NEGATIVE /HPF
BASOPHILS # BLD AUTO: 0.8 % (ref 0–1.8)
BASOPHILS # BLD: 0.06 K/UL (ref 0–0.12)
BILIRUB SERPL-MCNC: 0.4 MG/DL (ref 0.1–1.5)
BILIRUB UR QL STRIP.AUTO: NEGATIVE
BUN SERPL-MCNC: 26 MG/DL (ref 8–22)
CALCIUM SERPL-MCNC: 9.8 MG/DL (ref 8.4–10.2)
CHLORIDE SERPL-SCNC: 102 MMOL/L (ref 96–112)
CO2 SERPL-SCNC: 26 MMOL/L (ref 20–33)
COLOR UR: YELLOW
CREAT SERPL-MCNC: 1.11 MG/DL (ref 0.5–1.4)
EOSINOPHIL # BLD AUTO: 0.26 K/UL (ref 0–0.51)
EOSINOPHIL NFR BLD: 3.5 % (ref 0–6.9)
EPI CELLS #/AREA URNS HPF: NEGATIVE /HPF
ERYTHROCYTE [DISTWIDTH] IN BLOOD BY AUTOMATED COUNT: 41.1 FL (ref 35.9–50)
GFR SERPLBLD CREATININE-BSD FMLA CKD-EPI: 74 ML/MIN/1.73 M 2
GLOBULIN SER CALC-MCNC: 2.9 G/DL (ref 1.9–3.5)
GLUCOSE SERPL-MCNC: 112 MG/DL (ref 65–99)
GLUCOSE UR STRIP.AUTO-MCNC: NEGATIVE MG/DL
HCT VFR BLD AUTO: 47.2 % (ref 42–52)
HGB BLD-MCNC: 15.6 G/DL (ref 14–18)
IMM GRANULOCYTES # BLD AUTO: 0.03 K/UL (ref 0–0.11)
IMM GRANULOCYTES NFR BLD AUTO: 0.4 % (ref 0–0.9)
KETONES UR STRIP.AUTO-MCNC: NEGATIVE MG/DL
LEUKOCYTE ESTERASE UR QL STRIP.AUTO: NEGATIVE
LIPASE SERPL-CCNC: 41 U/L (ref 7–58)
LYMPHOCYTES # BLD AUTO: 3.46 K/UL (ref 1–4.8)
LYMPHOCYTES NFR BLD: 46.2 % (ref 22–41)
MCH RBC QN AUTO: 30.7 PG (ref 27–33)
MCHC RBC AUTO-ENTMCNC: 33.1 G/DL (ref 33.7–35.3)
MCV RBC AUTO: 92.9 FL (ref 81.4–97.8)
MICRO URNS: ABNORMAL
MONOCYTES # BLD AUTO: 0.77 K/UL (ref 0–0.85)
MONOCYTES NFR BLD AUTO: 10.3 % (ref 0–13.4)
NEUTROPHILS # BLD AUTO: 2.91 K/UL (ref 1.82–7.42)
NEUTROPHILS NFR BLD: 38.8 % (ref 44–72)
NITRITE UR QL STRIP.AUTO: NEGATIVE
NRBC # BLD AUTO: 0 K/UL
NRBC BLD-RTO: 0 /100 WBC
PH UR STRIP.AUTO: 6 [PH] (ref 5–8)
PLATELET # BLD AUTO: 192 K/UL (ref 164–446)
PMV BLD AUTO: 11.3 FL (ref 9–12.9)
POTASSIUM SERPL-SCNC: 3.7 MMOL/L (ref 3.6–5.5)
PROT SERPL-MCNC: 7.5 G/DL (ref 6–8.2)
PROT UR QL STRIP: NEGATIVE MG/DL
RBC # BLD AUTO: 5.08 M/UL (ref 4.7–6.1)
RBC # URNS HPF: ABNORMAL /HPF
RBC UR QL AUTO: ABNORMAL
SODIUM SERPL-SCNC: 139 MMOL/L (ref 135–145)
SP GR UR STRIP.AUTO: 1.02
WBC # BLD AUTO: 7.5 K/UL (ref 4.8–10.8)
WBC #/AREA URNS HPF: ABNORMAL /HPF

## 2022-09-28 PROCEDURE — 99284 EMERGENCY DEPT VISIT MOD MDM: CPT

## 2022-09-28 PROCEDURE — 96374 THER/PROPH/DIAG INJ IV PUSH: CPT | Mod: XU

## 2022-09-28 PROCEDURE — 81001 URINALYSIS AUTO W/SCOPE: CPT

## 2022-09-28 PROCEDURE — 700111 HCHG RX REV CODE 636 W/ 250 OVERRIDE (IP): Performed by: EMERGENCY MEDICINE

## 2022-09-28 PROCEDURE — 85025 COMPLETE CBC W/AUTO DIFF WBC: CPT

## 2022-09-28 PROCEDURE — 83690 ASSAY OF LIPASE: CPT

## 2022-09-28 PROCEDURE — 74177 CT ABD & PELVIS W/CONTRAST: CPT

## 2022-09-28 PROCEDURE — 96375 TX/PRO/DX INJ NEW DRUG ADDON: CPT

## 2022-09-28 PROCEDURE — 700117 HCHG RX CONTRAST REV CODE 255: Performed by: EMERGENCY MEDICINE

## 2022-09-28 PROCEDURE — 80053 COMPREHEN METABOLIC PANEL: CPT

## 2022-09-28 PROCEDURE — 36415 COLL VENOUS BLD VENIPUNCTURE: CPT

## 2022-09-28 RX ORDER — TAMSULOSIN HYDROCHLORIDE 0.4 MG/1
0.4 CAPSULE ORAL DAILY
Qty: 10 CAPSULE | Refills: 0 | Status: ON HOLD | OUTPATIENT
Start: 2022-09-28 | End: 2022-10-05

## 2022-09-28 RX ORDER — HYDROCODONE BITARTRATE AND ACETAMINOPHEN 5; 325 MG/1; MG/1
1 TABLET ORAL EVERY 6 HOURS PRN
Qty: 9 TABLET | Refills: 0 | Status: SHIPPED | OUTPATIENT
Start: 2022-09-28 | End: 2022-09-28 | Stop reason: SDUPTHER

## 2022-09-28 RX ORDER — TAMSULOSIN HYDROCHLORIDE 0.4 MG/1
0.4 CAPSULE ORAL DAILY
Qty: 10 CAPSULE | Refills: 0 | Status: SHIPPED | OUTPATIENT
Start: 2022-09-28 | End: 2022-09-28 | Stop reason: SDUPTHER

## 2022-09-28 RX ORDER — MORPHINE SULFATE 4 MG/ML
4 INJECTION INTRAVENOUS ONCE
Status: COMPLETED | OUTPATIENT
Start: 2022-09-28 | End: 2022-09-28

## 2022-09-28 RX ORDER — KETOROLAC TROMETHAMINE 30 MG/ML
15 INJECTION, SOLUTION INTRAMUSCULAR; INTRAVENOUS ONCE
Status: COMPLETED | OUTPATIENT
Start: 2022-09-28 | End: 2022-09-28

## 2022-09-28 RX ORDER — HYDROCODONE BITARTRATE AND ACETAMINOPHEN 5; 325 MG/1; MG/1
1 TABLET ORAL EVERY 6 HOURS PRN
Qty: 9 TABLET | Refills: 0 | Status: SHIPPED | OUTPATIENT
Start: 2022-09-28 | End: 2022-10-01

## 2022-09-28 RX ADMIN — KETOROLAC TROMETHAMINE 15 MG: 30 INJECTION, SOLUTION INTRAMUSCULAR; INTRAVENOUS at 06:51

## 2022-09-28 RX ADMIN — IOHEXOL 100 ML: 350 INJECTION, SOLUTION INTRAVENOUS at 08:10

## 2022-09-28 RX ADMIN — MORPHINE SULFATE 4 MG: 4 INJECTION INTRAVENOUS at 06:51

## 2022-09-28 ASSESSMENT — PAIN DESCRIPTION - PAIN TYPE: TYPE: ACUTE PAIN

## 2022-09-28 NOTE — ED PROVIDER NOTES
ED Physician Note    Chief Concern:   Left flank pain, abdominal pain    HPI:  Miguel Angel Fernández is a very pleasant 62-year-old gentleman who presents to the emergency department for evaluation of abdominal pain and left flank pain.  His symptoms began around 3:00 this morning, about 3 hours prior to initial presentation.  He initially woke from sleep with some generalized abdominal discomfort and cramping.  At first he thought it might be constipation so he took a dose of MiraLAX, however he was able to have a bowel movement without resolution of symptoms.  He then developed pain localized to the left flank, he describes this is a sharp pain that is persistent.  He does have a history of kidney stones, states his symptoms were very similar when he was previously diagnosed with renal stones.  He has no associated chest pain, no shortness of breath, no fevers.  He reports no urinary symptoms.  He did have a recent orchiectomy that was elective due to chronic pain and atrophic left testicle, states he has no complications, no pain, and no dysuria since that procedure. He reports no epigastric pain, no right upper quadrant pain.  No exertional or postprandial component.  He is unable to identify any reliably exacerbating or alleviating factors.    Review of Systems:  See HPI for pertinent positives and negatives. All other systems negative.    Past Medical History:   has a past medical history of Anxiety, Bowel habit changes, Chickenpox (as child), Closed head injury (2004), Cold (10/25/2018), Depression, Dermatitis, Diabetes ([facility]), Elevated alkaline phosphatase level (12/5/2018), Frequent headaches (1/26/2016), GERD (gastroesophageal reflux disease), Polish measles (as child), Heart burn, Hiatus hernia syndrome, Hyperglycemia (1/26/2016), Hypertension, Incomplete tear of right rotator cuff (10/8/2018), Indigestion, Kidney stone, Major depressive disorder with single episode, in full remission ([facility]) (11/22/2010),  Psychiatric problem, Right shoulder pain (10/25/2018), Rosacea (2016), Sleep apnea, and Snoring.    Social History:  Social History     Tobacco Use    Smoking status: Former     Packs/day: 1.00     Years: 5.00     Pack years: 5.00     Types: Cigarettes     Quit date: 1982     Years since quittin.7    Smokeless tobacco: Never    Tobacco comments:     quit 35 years ago   Vaping Use    Vaping Use: Never used   Substance and Sexual Activity    Alcohol use: Not Currently     Alcohol/week: 0.0 oz     Comment: rare     Drug use: Not Currently    Sexual activity: Not Currently       Surgical History:   has a past surgical history that includes orif, elbow (Left, 10/2004); repair, knee, acl (Left, 2002); septoturbinoplasty (2010); ventral hernia repair (2016); meniscectomy (Right, ); hip arthroplasty total (Left, 2016); eye surgery (Right, ); testicle exploration (); cystoscopy (2010); dental extraction(s) (); colonoscopy (2018); shoulder decompression arthroscopic (Right, 2018); clavicle distal excision (Right, 2018); and orchiectomy (Left, 2022).    Current Medications:  Home Medications       Reviewed by Tia Gonsalves R.N. (Registered Nurse) on 22 at 0944  Med List Status: Not Addressed     Medication Last Dose Status   APPLE CIDER VINEGAR PO  Active   asa/apap/caffeine (EXCEDRIN) 250-250-65 MG Tab  Active   Fremanezumab-vfrm (AJOVY) 225 MG/1.5ML Solution Auto-injector  Active   Glucosamine HCl 1500 MG Tab  Active   hydrOXYzine HCl (ATARAX) 25 MG Tab  Active   loratadine (CLARITIN) 10 MG Tab  Active   losartan (COZAAR) 50 MG Tab  Active   magnesium oxide (MAG-OX) 400 MG Tab  Active   metFORMIN (GLUCOPHAGE) 500 MG Tab  Active   omeprazole (PRILOSEC) 20 MG delayed-release capsule  Active   sildenafil citrate (VIAGRA) 50 MG tablet  Active   simvastatin (ZOCOR) 5 MG Tab  Active   Vilazodone HCl 10 MG Tab  Active                    Allergies:  No  "Known Allergies    Physical Exam:  Vital Signs: BP (!) 136/93   Pulse 60   Temp (!) 35.5 °C (95.9 °F) (Oral)   Resp 18   Ht 1.778 m (5' 10\")   Wt 78.9 kg (174 lb)   SpO2 97%   BMI 24.97 kg/m²   Constitutional: Alert, no acute distress  HENT: Normocephalic, mask in place  Eyes: Pupils equal and reactive, normal conjunctiva  Neck: Supple, normal range of motion, no stridor  Cardiovascular: Extremities are warm and well perfused, no murmur appreciated, normal cardiac auscultation  Pulmonary: No respiratory distress, normal work of breathing, no accessory muscule usage, breath sounds clear and equal bilaterally  Abdomen: Soft, non-distended, non-tender to palpation, no peritoneal signs, no right lower quadrant tenderness to palpation, very minimal diffuse discomfort on palpation of the periumbilical area, palpation of the left CVA does not reproduce pain.  Skin: Warm, dry, no rashes or lesions  Musculoskeletal: Normal range of motion in all extremities, no swelling or deformity noted  Neurologic: Alert, oriented, normal speech, normal motor function  Psychiatric: Normal and appropriate mood and affect    Medical records reviewed for continuity of care.  Operative report reviewed from 9/23/2022.  Mr. Fernández underwent left scrotal orchiectomy due to atrophic left testicle and chronic left testicular pain.    Labs:  Labs Reviewed   CBC WITH DIFFERENTIAL - Abnormal; Notable for the following components:       Result Value    MCHC 33.1 (*)     Neutrophils-Polys 38.80 (*)     Lymphocytes 46.20 (*)     All other components within normal limits   COMP METABOLIC PANEL - Abnormal; Notable for the following components:    Glucose 112 (*)     Bun 26 (*)     Alkaline Phosphatase 104 (*)     All other components within normal limits   URINALYSIS,CULTURE IF INDICATED - Abnormal; Notable for the following components:    Occult Blood Large (*)     All other components within normal limits    Narrative:     Indication for " culture:->Patient WITHOUT an indwelling Huang  catheter in place with new onset of Dysuria, Frequency,  Urgency, and/or Suprapubic pain   URINE MICROSCOPIC (W/UA) - Abnormal; Notable for the following components:    WBC 0-2 (*)     RBC 10-20 (*)     All other components within normal limits    Narrative:     Indication for culture:->Patient WITHOUT an indwelling Huang  catheter in place with new onset of Dysuria, Frequency,  Urgency, and/or Suprapubic pain   LIPASE   ESTIMATED GFR       Radiology:  CT-ABDOMEN-PELVIS WITH   Final Result      8 mm distal ureteral calculus causes hydroureteronephrosis, ureteral and periureteral enhancement, fat stranding and a delayed nephrogram. The degree of ureteral abnormality is most likely due to chronic inflammation from the past calculus but urothelial    neoplasm is in the differential and follow-up to resolution is recommended      Bilateral small nonobstructive nephroliths      Hepatic dome ill-defined 12 mm mass is indeterminate and could be a hemangioma. Cyst or other neoplasm are possible and correlation with hepatic function tests as well as potential follow-up ultrasound or MRI is recommended      Above average stool volume      Gas tracking along the left spermatic cord. Recommend correlation for recent surgical intervention. Otherwise, infection could be considered. Note there is no abnormal fluid collection to suggest abscess           ED Medications Administered:  Medications   morphine 4 MG/ML injection 4 mg (4 mg Intravenous Given 9/28/22 0651)   ketorolac (TORADOL) injection 15 mg (15 mg Intravenous Given 9/28/22 0651)   iohexol (OMNIPAQUE) 350 mg/mL (IV) (100 mL Intravenous Given 9/28/22 0810)       Differential diagnosis:  Renal stone, ureteral stone, pyelonephritis, AAA, diverticulitis    MDM:  Mr. Fernández presents to the emergency department today for evaluation of abdominal pain that initially felt diffuse, now seems localized to the left flank.  He does  report a history of ureteral stones in the past.  He has had no associated fevers, no dysuria, no hematuria.  He reports no prior history of appendectomy or cholecystectomy.  His vital signs are reassuring, he has no fever, no tachycardia, no hypotension.  His abdominal exam is benign without peritoneal signs.    On laboratory evaluation he has a normal creatinine, liver enzymes are within normal limits.  No electrolyte abnormalities.  Urinalysis is nitrite negative and negative for bacteria on microscopic examination.  He has a normal white blood count, normal hemoglobin.  This is again less concerning for severe infection.    CT abdomen pelvis demonstrates an 8 mm distal ureteral calculus with hydroureteronephrosis, ureteral and periureteral enhancement, fat stranding, and a delayed nephrogram.  Degree of ureteral abnormality is most likely due to chronic inflammation within the past calculus, but urothelial neoplasm is in the differential and follow-up to resolution is recommended.  There was also noted to be gas tracking along the left spermatic cord, which is consistent with his recent surgical procedure.  There is an ill-defined mass on the liver as well with follow-up recommended.    Incidental findings were discussed, he is counseled to follow-up with his primary care physician, as well as his urologist to review his visit, labs, and imaging from today.  At this time I do not believe he requires emergent urologic intervention, he is discharged on Flomax with a prescription for hydrocodone, counseled to call urology [place] today, as well as his primary care clinic, to schedule a follow-up appointment.  No evidence of infected stone, do not believe he requires emergent stent placement. Return precautions were discussed with the patient, and provided in written form with the patient's discharge instructions.     Personal protective equipment including N95 surgical respirator, goggles, and gloves were used  during this encounter.       Disposition:  Discharge home in stable condition    Final Impression:  1. Flank pain    2. Ureterolithiasis        Electronically signed by: Chasity Rosenberg MD, 9/28/2022 9:57 AM

## 2022-09-28 NOTE — ED NOTES
Dc instructions reviewed with pt. Aware of need to f/u with urology.  meds at pharmacy-[facility] to local per request, as well as return for worsening s/s and no driving due to meds recvd

## 2022-09-28 NOTE — ED TRIAGE NOTES
"Patient presents to the ER with the following complaints:    Chief Complaint   Patient presents with    Flank Pain     Patient states, \"I have had two kidney stones and this feels like that did.\" Pain 8/10 left flank.        BP (!) 168/111   Pulse 66   Temp (!) 35.5 °C (95.9 °F) (Oral)   Resp 16   Ht 1.778 m (5' 10\")   Wt 78.9 kg (174 lb)   SpO2 99%   BMI 24.97 kg/m²       "

## 2022-09-28 NOTE — DISCHARGE INSTRUCTIONS
These call the urology clinic today to schedule follow-up appointment for complete recheck.  Return to the emergency department if you develop any new or worsening symptoms, especially if you develop pain with urination, fevers, pain that does not improve with the medications provided, or if you have any further concerns.    As we discussed, there are some abnormalities on your CT scan, and the kidney affected by the stone has a slightly abnormal appearance.  Please make sure to let Dr. Lynn know that the CT was slightly abnormal, have him review the CT that we did today so that he may plan any follow-up that is needed.

## 2022-09-28 NOTE — ED NOTES
Pt rounding, vs as charted, call light in reach. Denies pain at this time. Aware of pending ct. Ct called regarding eta for same

## 2022-10-05 ENCOUNTER — ANESTHESIA (OUTPATIENT)
Dept: SURGERY | Facility: MEDICAL CENTER | Age: 62
End: 2022-10-05
Payer: MEDICAID

## 2022-10-05 ENCOUNTER — APPOINTMENT (OUTPATIENT)
Dept: RADIOLOGY | Facility: MEDICAL CENTER | Age: 62
End: 2022-10-05
Attending: EMERGENCY MEDICINE
Payer: MEDICAID

## 2022-10-05 ENCOUNTER — APPOINTMENT (OUTPATIENT)
Dept: RADIOLOGY | Facility: MEDICAL CENTER | Age: 62
End: 2022-10-05
Attending: UROLOGY
Payer: MEDICAID

## 2022-10-05 ENCOUNTER — HOSPITAL ENCOUNTER (OUTPATIENT)
Facility: MEDICAL CENTER | Age: 62
End: 2022-10-05
Attending: UROLOGY | Admitting: UROLOGY
Payer: MEDICAID

## 2022-10-05 ENCOUNTER — ANESTHESIA EVENT (OUTPATIENT)
Dept: SURGERY | Facility: MEDICAL CENTER | Age: 62
End: 2022-10-05
Payer: MEDICAID

## 2022-10-05 ENCOUNTER — HOSPITAL ENCOUNTER (OUTPATIENT)
Facility: MEDICAL CENTER | Age: 62
End: 2022-10-06
Attending: EMERGENCY MEDICINE | Admitting: HOSPITALIST
Payer: MEDICAID

## 2022-10-05 DIAGNOSIS — N20.1 URETERAL STONE: ICD-10-CM

## 2022-10-05 DIAGNOSIS — R30.0 DYSURIA: ICD-10-CM

## 2022-10-05 DIAGNOSIS — N20.1 URETEROLITHIASIS: ICD-10-CM

## 2022-10-05 PROBLEM — E66.9 OBESITY (BMI 30-39.9): Status: RESOLVED | Noted: 2018-10-08 | Resolved: 2022-10-05

## 2022-10-05 LAB
ALBUMIN SERPL BCP-MCNC: 4.3 G/DL (ref 3.2–4.9)
ALBUMIN/GLOB SERPL: 1.7 G/DL
ALP SERPL-CCNC: 93 U/L (ref 30–99)
ALT SERPL-CCNC: 18 U/L (ref 2–50)
ANION GAP SERPL CALC-SCNC: 10 MMOL/L (ref 7–16)
APPEARANCE UR: CLEAR
AST SERPL-CCNC: 22 U/L (ref 12–45)
BASOPHILS # BLD AUTO: 0.7 % (ref 0–1.8)
BASOPHILS # BLD: 0.05 K/UL (ref 0–0.12)
BILIRUB SERPL-MCNC: 0.3 MG/DL (ref 0.1–1.5)
BILIRUB UR QL STRIP.AUTO: NEGATIVE
BUN SERPL-MCNC: 22 MG/DL (ref 8–22)
CALCIUM SERPL-MCNC: 9.6 MG/DL (ref 8.4–10.2)
CHLORIDE SERPL-SCNC: 104 MMOL/L (ref 96–112)
CO2 SERPL-SCNC: 26 MMOL/L (ref 20–33)
COLOR UR: YELLOW
CREAT SERPL-MCNC: 1.02 MG/DL (ref 0.5–1.4)
EOSINOPHIL # BLD AUTO: 0.19 K/UL (ref 0–0.51)
EOSINOPHIL NFR BLD: 2.5 % (ref 0–6.9)
EPI CELLS #/AREA URNS HPF: ABNORMAL /HPF
ERYTHROCYTE [DISTWIDTH] IN BLOOD BY AUTOMATED COUNT: 41.9 FL (ref 35.9–50)
GFR SERPLBLD CREATININE-BSD FMLA CKD-EPI: 82 ML/MIN/1.73 M 2
GLOBULIN SER CALC-MCNC: 2.6 G/DL (ref 1.9–3.5)
GLUCOSE SERPL-MCNC: 116 MG/DL (ref 65–99)
GLUCOSE UR STRIP.AUTO-MCNC: NEGATIVE MG/DL
HCT VFR BLD AUTO: 41.9 % (ref 42–52)
HGB BLD-MCNC: 14 G/DL (ref 14–18)
IMM GRANULOCYTES # BLD AUTO: 0.04 K/UL (ref 0–0.11)
IMM GRANULOCYTES NFR BLD AUTO: 0.5 % (ref 0–0.9)
KETONES UR STRIP.AUTO-MCNC: NEGATIVE MG/DL
LEUKOCYTE ESTERASE UR QL STRIP.AUTO: NEGATIVE
LYMPHOCYTES # BLD AUTO: 1.66 K/UL (ref 1–4.8)
LYMPHOCYTES NFR BLD: 21.9 % (ref 22–41)
MCH RBC QN AUTO: 30.6 PG (ref 27–33)
MCHC RBC AUTO-ENTMCNC: 33.4 G/DL (ref 33.7–35.3)
MCV RBC AUTO: 91.5 FL (ref 81.4–97.8)
MICRO URNS: ABNORMAL
MONOCYTES # BLD AUTO: 0.61 K/UL (ref 0–0.85)
MONOCYTES NFR BLD AUTO: 8 % (ref 0–13.4)
NEUTROPHILS # BLD AUTO: 5.03 K/UL (ref 1.82–7.42)
NEUTROPHILS NFR BLD: 66.4 % (ref 44–72)
NITRITE UR QL STRIP.AUTO: NEGATIVE
NRBC # BLD AUTO: 0 K/UL
NRBC BLD-RTO: 0 /100 WBC
PATHOLOGY CONSULT NOTE: NORMAL
PH UR STRIP.AUTO: 6 [PH] (ref 5–8)
PLATELET # BLD AUTO: 184 K/UL (ref 164–446)
PMV BLD AUTO: 11.2 FL (ref 9–12.9)
POTASSIUM SERPL-SCNC: 4.3 MMOL/L (ref 3.6–5.5)
PROT SERPL-MCNC: 6.9 G/DL (ref 6–8.2)
PROT UR QL STRIP: NEGATIVE MG/DL
RBC # BLD AUTO: 4.58 M/UL (ref 4.7–6.1)
RBC # URNS HPF: ABNORMAL /HPF
RBC UR QL AUTO: ABNORMAL
SODIUM SERPL-SCNC: 140 MMOL/L (ref 135–145)
SP GR UR STRIP.AUTO: <=1.005
WBC # BLD AUTO: 7.6 K/UL (ref 4.8–10.8)
WBC #/AREA URNS HPF: ABNORMAL /HPF

## 2022-10-05 PROCEDURE — 82365 CALCULUS SPECTROSCOPY: CPT

## 2022-10-05 PROCEDURE — 700111 HCHG RX REV CODE 636 W/ 250 OVERRIDE (IP): Performed by: ANESTHESIOLOGY

## 2022-10-05 PROCEDURE — 700105 HCHG RX REV CODE 258: Performed by: HOSPITALIST

## 2022-10-05 PROCEDURE — G0378 HOSPITAL OBSERVATION PER HR: HCPCS

## 2022-10-05 PROCEDURE — 700111 HCHG RX REV CODE 636 W/ 250 OVERRIDE (IP): Performed by: HOSPITALIST

## 2022-10-05 PROCEDURE — C1894 INTRO/SHEATH, NON-LASER: HCPCS | Performed by: UROLOGY

## 2022-10-05 PROCEDURE — 85025 COMPLETE CBC W/AUTO DIFF WBC: CPT

## 2022-10-05 PROCEDURE — 110371 HCHG SHELL REV 272: Performed by: UROLOGY

## 2022-10-05 PROCEDURE — 36415 COLL VENOUS BLD VENIPUNCTURE: CPT

## 2022-10-05 PROCEDURE — 87086 URINE CULTURE/COLONY COUNT: CPT

## 2022-10-05 PROCEDURE — 80053 COMPREHEN METABOLIC PANEL: CPT

## 2022-10-05 PROCEDURE — 88300 SURGICAL PATH GROSS: CPT

## 2022-10-05 PROCEDURE — 99285 EMERGENCY DEPT VISIT HI MDM: CPT

## 2022-10-05 PROCEDURE — C1758 CATHETER, URETERAL: HCPCS | Performed by: UROLOGY

## 2022-10-05 PROCEDURE — 160009 HCHG ANES TIME/MIN: Performed by: UROLOGY

## 2022-10-05 PROCEDURE — 76775 US EXAM ABDO BACK WALL LIM: CPT

## 2022-10-05 PROCEDURE — 700117 HCHG RX CONTRAST REV CODE 255: Performed by: UROLOGY

## 2022-10-05 PROCEDURE — 160028 HCHG SURGERY MINUTES - 1ST 30 MINS LEVEL 3: Performed by: UROLOGY

## 2022-10-05 PROCEDURE — 700101 HCHG RX REV CODE 250: Performed by: ANESTHESIOLOGY

## 2022-10-05 PROCEDURE — 160048 HCHG OR STATISTICAL LEVEL 1-5: Performed by: UROLOGY

## 2022-10-05 PROCEDURE — 99220 PR INITIAL OBSERVATION CARE,LEVL III: CPT | Performed by: HOSPITALIST

## 2022-10-05 PROCEDURE — 160035 HCHG PACU - 1ST 60 MINS PHASE I: Performed by: UROLOGY

## 2022-10-05 PROCEDURE — 94760 N-INVAS EAR/PLS OXIMETRY 1: CPT

## 2022-10-05 PROCEDURE — 81001 URINALYSIS AUTO W/SCOPE: CPT

## 2022-10-05 PROCEDURE — 74018 RADEX ABDOMEN 1 VIEW: CPT

## 2022-10-05 PROCEDURE — 160002 HCHG RECOVERY MINUTES (STAT): Performed by: UROLOGY

## 2022-10-05 PROCEDURE — 00918 ANES TRURL PX URTRL CAL RMVL: CPT | Performed by: ANESTHESIOLOGY

## 2022-10-05 PROCEDURE — C1769 GUIDE WIRE: HCPCS | Performed by: UROLOGY

## 2022-10-05 RX ORDER — DIPHENHYDRAMINE HYDROCHLORIDE 50 MG/ML
12.5 INJECTION INTRAMUSCULAR; INTRAVENOUS
Status: DISCONTINUED | OUTPATIENT
Start: 2022-10-05 | End: 2022-10-05 | Stop reason: HOSPADM

## 2022-10-05 RX ORDER — VILAZODONE HYDROCHLORIDE 10 MG/1
10 TABLET ORAL DAILY
Status: DISCONTINUED | OUTPATIENT
Start: 2022-10-05 | End: 2022-10-05

## 2022-10-05 RX ORDER — HALOPERIDOL 5 MG/ML
1 INJECTION INTRAMUSCULAR
Status: DISCONTINUED | OUTPATIENT
Start: 2022-10-05 | End: 2022-10-05 | Stop reason: HOSPADM

## 2022-10-05 RX ORDER — AMOXICILLIN 250 MG
2 CAPSULE ORAL 2 TIMES DAILY
Status: DISCONTINUED | OUTPATIENT
Start: 2022-10-06 | End: 2022-10-06 | Stop reason: HOSPADM

## 2022-10-05 RX ORDER — MEPERIDINE HYDROCHLORIDE 25 MG/ML
6.25 INJECTION INTRAMUSCULAR; INTRAVENOUS; SUBCUTANEOUS
Status: DISCONTINUED | OUTPATIENT
Start: 2022-10-05 | End: 2022-10-05 | Stop reason: HOSPADM

## 2022-10-05 RX ORDER — ONDANSETRON 2 MG/ML
4 INJECTION INTRAMUSCULAR; INTRAVENOUS
Status: DISCONTINUED | OUTPATIENT
Start: 2022-10-05 | End: 2022-10-05 | Stop reason: HOSPADM

## 2022-10-05 RX ORDER — HYDROMORPHONE HYDROCHLORIDE 1 MG/ML
0.2 INJECTION, SOLUTION INTRAMUSCULAR; INTRAVENOUS; SUBCUTANEOUS
Status: DISCONTINUED | OUTPATIENT
Start: 2022-10-05 | End: 2022-10-05 | Stop reason: HOSPADM

## 2022-10-05 RX ORDER — LOSARTAN POTASSIUM 25 MG/1
100 TABLET ORAL DAILY
Status: DISCONTINUED | OUTPATIENT
Start: 2022-10-06 | End: 2022-10-06 | Stop reason: HOSPADM

## 2022-10-05 RX ORDER — OXYCODONE HCL 5 MG/5 ML
10 SOLUTION, ORAL ORAL
Status: DISCONTINUED | OUTPATIENT
Start: 2022-10-05 | End: 2022-10-05 | Stop reason: HOSPADM

## 2022-10-05 RX ORDER — KETOROLAC TROMETHAMINE 30 MG/ML
15 INJECTION, SOLUTION INTRAMUSCULAR; INTRAVENOUS EVERY 6 HOURS PRN
Status: DISCONTINUED | OUTPATIENT
Start: 2022-10-05 | End: 2022-10-06 | Stop reason: HOSPADM

## 2022-10-05 RX ORDER — ONDANSETRON 2 MG/ML
4 INJECTION INTRAMUSCULAR; INTRAVENOUS EVERY 4 HOURS PRN
Status: DISCONTINUED | OUTPATIENT
Start: 2022-10-05 | End: 2022-10-06 | Stop reason: HOSPADM

## 2022-10-05 RX ORDER — ACETAMINOPHEN 325 MG/1
650 TABLET ORAL EVERY 6 HOURS PRN
Status: DISCONTINUED | OUTPATIENT
Start: 2022-10-05 | End: 2022-10-06 | Stop reason: HOSPADM

## 2022-10-05 RX ORDER — PROMETHAZINE HYDROCHLORIDE 25 MG/1
12.5-25 SUPPOSITORY RECTAL EVERY 4 HOURS PRN
Status: DISCONTINUED | OUTPATIENT
Start: 2022-10-05 | End: 2022-10-06 | Stop reason: HOSPADM

## 2022-10-05 RX ORDER — HYDROMORPHONE HYDROCHLORIDE 1 MG/ML
0.1 INJECTION, SOLUTION INTRAMUSCULAR; INTRAVENOUS; SUBCUTANEOUS
Status: DISCONTINUED | OUTPATIENT
Start: 2022-10-05 | End: 2022-10-05 | Stop reason: HOSPADM

## 2022-10-05 RX ORDER — OXYCODONE HCL 5 MG/5 ML
5 SOLUTION, ORAL ORAL
Status: DISCONTINUED | OUTPATIENT
Start: 2022-10-05 | End: 2022-10-05 | Stop reason: HOSPADM

## 2022-10-05 RX ORDER — ONDANSETRON 4 MG/1
4 TABLET, ORALLY DISINTEGRATING ORAL EVERY 4 HOURS PRN
Status: DISCONTINUED | OUTPATIENT
Start: 2022-10-05 | End: 2022-10-06 | Stop reason: HOSPADM

## 2022-10-05 RX ORDER — HYDROMORPHONE HYDROCHLORIDE 1 MG/ML
0.4 INJECTION, SOLUTION INTRAMUSCULAR; INTRAVENOUS; SUBCUTANEOUS
Status: DISCONTINUED | OUTPATIENT
Start: 2022-10-05 | End: 2022-10-05 | Stop reason: HOSPADM

## 2022-10-05 RX ORDER — BISACODYL 10 MG
10 SUPPOSITORY, RECTAL RECTAL
Status: DISCONTINUED | OUTPATIENT
Start: 2022-10-05 | End: 2022-10-06 | Stop reason: HOSPADM

## 2022-10-05 RX ORDER — SODIUM CHLORIDE, SODIUM LACTATE, POTASSIUM CHLORIDE, CALCIUM CHLORIDE 600; 310; 30; 20 MG/100ML; MG/100ML; MG/100ML; MG/100ML
INJECTION, SOLUTION INTRAVENOUS CONTINUOUS
Status: ACTIVE | OUTPATIENT
Start: 2022-10-05 | End: 2022-10-05

## 2022-10-05 RX ORDER — PROMETHAZINE HYDROCHLORIDE 25 MG/1
12.5-25 TABLET ORAL EVERY 4 HOURS PRN
Status: DISCONTINUED | OUTPATIENT
Start: 2022-10-05 | End: 2022-10-06 | Stop reason: HOSPADM

## 2022-10-05 RX ORDER — MORPHINE SULFATE 4 MG/ML
1-4 INJECTION INTRAVENOUS
Status: DISCONTINUED | OUTPATIENT
Start: 2022-10-05 | End: 2022-10-06 | Stop reason: HOSPADM

## 2022-10-05 RX ORDER — LIDOCAINE HYDROCHLORIDE 20 MG/ML
INJECTION, SOLUTION EPIDURAL; INFILTRATION; INTRACAUDAL; PERINEURAL PRN
Status: DISCONTINUED | OUTPATIENT
Start: 2022-10-05 | End: 2022-10-05 | Stop reason: SURG

## 2022-10-05 RX ORDER — PROCHLORPERAZINE EDISYLATE 5 MG/ML
5-10 INJECTION INTRAMUSCULAR; INTRAVENOUS EVERY 4 HOURS PRN
Status: DISCONTINUED | OUTPATIENT
Start: 2022-10-05 | End: 2022-10-06 | Stop reason: HOSPADM

## 2022-10-05 RX ORDER — ERENUMAB-AOOE 140 MG/ML
140 INJECTION, SOLUTION SUBCUTANEOUS
COMMUNITY

## 2022-10-05 RX ORDER — SODIUM CHLORIDE, SODIUM LACTATE, POTASSIUM CHLORIDE, CALCIUM CHLORIDE 600; 310; 30; 20 MG/100ML; MG/100ML; MG/100ML; MG/100ML
INJECTION, SOLUTION INTRAVENOUS CONTINUOUS
Status: DISCONTINUED | OUTPATIENT
Start: 2022-10-05 | End: 2022-10-06 | Stop reason: HOSPADM

## 2022-10-05 RX ORDER — CEFAZOLIN SODIUM 1 G/3ML
INJECTION, POWDER, FOR SOLUTION INTRAMUSCULAR; INTRAVENOUS PRN
Status: DISCONTINUED | OUTPATIENT
Start: 2022-10-05 | End: 2022-10-05 | Stop reason: SURG

## 2022-10-05 RX ORDER — HYDROXYZINE HYDROCHLORIDE 25 MG/1
50 TABLET, FILM COATED ORAL
Status: DISCONTINUED | OUTPATIENT
Start: 2022-10-05 | End: 2022-10-06 | Stop reason: HOSPADM

## 2022-10-05 RX ORDER — POLYETHYLENE GLYCOL 3350 17 G/17G
1 POWDER, FOR SOLUTION ORAL
Status: DISCONTINUED | OUTPATIENT
Start: 2022-10-05 | End: 2022-10-06 | Stop reason: HOSPADM

## 2022-10-05 RX ORDER — SODIUM CHLORIDE, SODIUM LACTATE, POTASSIUM CHLORIDE, CALCIUM CHLORIDE 600; 310; 30; 20 MG/100ML; MG/100ML; MG/100ML; MG/100ML
INJECTION, SOLUTION INTRAVENOUS CONTINUOUS
Status: DISCONTINUED | OUTPATIENT
Start: 2022-10-05 | End: 2022-10-05 | Stop reason: HOSPADM

## 2022-10-05 RX ADMIN — LIDOCAINE HYDROCHLORIDE 100 MG: 20 INJECTION, SOLUTION EPIDURAL; INFILTRATION; INTRACAUDAL at 18:06

## 2022-10-05 RX ADMIN — PROPOFOL 200 MG: 10 INJECTION, EMULSION INTRAVENOUS at 18:06

## 2022-10-05 RX ADMIN — EPHEDRINE SULFATE 10 MG: 50 INJECTION INTRAMUSCULAR; INTRAVENOUS; SUBCUTANEOUS at 18:20

## 2022-10-05 RX ADMIN — MIDAZOLAM 2 MG: 1 INJECTION INTRAMUSCULAR; INTRAVENOUS at 17:47

## 2022-10-05 RX ADMIN — FENTANYL CITRATE 100 MCG: 50 INJECTION, SOLUTION INTRAMUSCULAR; INTRAVENOUS at 18:06

## 2022-10-05 RX ADMIN — EPHEDRINE SULFATE 10 MG: 50 INJECTION INTRAMUSCULAR; INTRAVENOUS; SUBCUTANEOUS at 18:19

## 2022-10-05 RX ADMIN — CEFAZOLIN 2 G: 330 INJECTION, POWDER, FOR SOLUTION INTRAMUSCULAR; INTRAVENOUS at 18:05

## 2022-10-05 RX ADMIN — KETOROLAC TROMETHAMINE 30 MG: 30 INJECTION, SOLUTION INTRAMUSCULAR; INTRAVENOUS at 18:49

## 2022-10-05 RX ADMIN — SODIUM CHLORIDE, POTASSIUM CHLORIDE, SODIUM LACTATE AND CALCIUM CHLORIDE: 600; 310; 30; 20 INJECTION, SOLUTION INTRAVENOUS at 20:02

## 2022-10-05 ASSESSMENT — COGNITIVE AND FUNCTIONAL STATUS - GENERAL
MOBILITY SCORE: 24
SUGGESTED CMS G CODE MODIFIER MOBILITY: CH
DAILY ACTIVITIY SCORE: 24
SUGGESTED CMS G CODE MODIFIER DAILY ACTIVITY: CH

## 2022-10-05 ASSESSMENT — LIFESTYLE VARIABLES
TOTAL SCORE: 0
ON A TYPICAL DAY WHEN YOU DRINK ALCOHOL HOW MANY DRINKS DO YOU HAVE: 0
EVER FELT BAD OR GUILTY ABOUT YOUR DRINKING: NO
ALCOHOL_USE: NO
TOTAL SCORE: 0
EVER HAD A DRINK FIRST THING IN THE MORNING TO STEADY YOUR NERVES TO GET RID OF A HANGOVER: NO
HOW MANY TIMES IN THE PAST YEAR HAVE YOU HAD 5 OR MORE DRINKS IN A DAY: 0
HAVE YOU EVER FELT YOU SHOULD CUT DOWN ON YOUR DRINKING: NO
AVERAGE NUMBER OF DAYS PER WEEK YOU HAVE A DRINK CONTAINING ALCOHOL: 0
CONSUMPTION TOTAL: NEGATIVE
HAVE PEOPLE ANNOYED YOU BY CRITICIZING YOUR DRINKING: NO
TOTAL SCORE: 0

## 2022-10-05 ASSESSMENT — ENCOUNTER SYMPTOMS
SHORTNESS OF BREATH: 0
CHILLS: 0
NAUSEA: 0
FEVER: 0
VOMITING: 0
FLANK PAIN: 1

## 2022-10-05 ASSESSMENT — PAIN DESCRIPTION - PAIN TYPE
TYPE: SURGICAL PAIN

## 2022-10-05 ASSESSMENT — FIBROSIS 4 INDEX: FIB4 SCORE: 1.635259635065353796

## 2022-10-05 ASSESSMENT — PATIENT HEALTH QUESTIONNAIRE - PHQ9
1. LITTLE INTEREST OR PLEASURE IN DOING THINGS: NOT AT ALL
SUM OF ALL RESPONSES TO PHQ9 QUESTIONS 1 AND 2: 0
2. FEELING DOWN, DEPRESSED, IRRITABLE, OR HOPELESS: NOT AT ALL

## 2022-10-05 NOTE — ED PROVIDER NOTES
ED Provider Note    CHIEF COMPLAINT  Chief Complaint   Patient presents with    Dysuria     This am-states dx with kidney stone last week        HPI  Miguel Angel Fernández is a 62 y.o. male who presents to the ED with complaints of dysuria and possible UTI.  The patient was seen in the emergency department on 28 September and diagnosed with a left distal ureteral 8 mm stone causing hydronephrosis.  Patient states that after that visit his pain actually completely resolved but yesterday started having dysuria and frequency and he has had a urinary tract infection in the past and feels the same.  Denies any overt abdominal pain fevers chills describes dysuria.  Apparently with the stone he was contacting his urologist Dr. Lynn who also did an orchiectomy on him just prior to the kidney stone diagnosis.  Currently he is just concerned about the possibility of urinary tract infection denies any flank pain fevers chills nausea vomiting or any other symptoms.    REVIEW OF SYSTEMS  See HPI for further details. All other systems are negative.     PAST MEDICAL HISTORY  Past Medical History:   Diagnosis Date    Anxiety     Bowel habit changes     10/25/18-resolved    Chickenpox as child    Closed head injury 2004    fell off ladder.    Cold 10/25/2018    States a little laryngitis and sore throat in past couple days. Denies fevers.     Depression     Dermatitis     to arms and face and pt pick at and scratches sites.    Diabetes (HCC)     orals meds. 10/25/18-pt does not check glucose.     Elevated alkaline phosphatase level 12/5/2018    Frequent headaches 1/26/2016    10/25/18-twice daily.    GERD (gastroesophageal reflux disease)     Persian measles as child    Heart burn     treated with omeprazole    Hiatus hernia syndrome     Hyperglycemia 1/26/2016    Hypertension     Incomplete tear of right rotator cuff 10/8/2018    Indigestion     treated with omeprazole.    Kidney stone     Major depressive disorder with single episode,  in full remission (HCC) 2010    Psychiatric problem     states depression/anxiety    Right shoulder pain 10/25/2018    Rosacea 2016    Sleep apnea     cpap    Snoring        FAMILY HISTORY  Family History   Problem Relation Age of Onset    Diabetes Father     Heart Attack Father 60    Cancer Neg Hx     Suicide Attempts Neg Hx        SOCIAL HISTORY  Social History     Socioeconomic History    Marital status: Single   Tobacco Use    Smoking status: Former     Packs/day: 1.00     Years: 5.00     Pack years: 5.00     Types: Cigarettes     Quit date: 1982     Years since quittin.7    Smokeless tobacco: Never    Tobacco comments:     quit 35 years ago   Vaping Use    Vaping Use: Never used   Substance and Sexual Activity    Alcohol use: Not Currently     Alcohol/week: 0.0 oz     Comment: rare     Drug use: Not Currently    Sexual activity: Not Currently   Social History Narrative    Patient is  with an adult son. He works full time as a       Pcp Pt States None  The patient denies any significant tobacco, alcohol or drug use    SURGICAL HISTORY  Past Surgical History:   Procedure Laterality Date    ORCHIECTOMY Left 2022    Procedure: LEFT SCROTAL ORCHIECTOMY FOR ATROPHIC TESTICLE AND PAIN;  Surgeon: Miguel Angel Lynn M.D.;  Location: [facility];  Service: Urology    SHOULDER DECOMPRESSION ARTHROSCOPIC Right 2018    Procedure: SHOULDER DECOMPRESSION ARTHROSCOPIC- SUBACROMIAL;  Surgeon: Nic Thompson M.D.;  Location: [facility];  Service: Orthopedics    CLAVICLE DISTAL EXCISION Right 2018    Procedure: CLAVICLE DISTAL EXCISION;  Surgeon: Nic Thompson M.D.;  Location: [facility];  Service: Orthopedics    COLONOSCOPY  2018    hx x3    HIP ARTHROPLASTY TOTAL Left 2016    Procedure: HIP ARTHROPLASTY TOTAL;  Surgeon: Dc Sanchez M.D.;  Location: [facility];  Service:     VENTRAL HERNIA REPAIR  2016     "Procedure: VENTRAL HERNIA REPAIR W/MESH;  Surgeon: Corona Delvalle M.D.;  Location: SURGERY [facility];  Service:     SEPTOTURBINOPLASTY  8/17/2010    Performed by VERONICA FRIEDMAN at SURGERY SAME DAY [facility]    CYSTOSCOPY  04/2010    with lithotripsy for kidney stone    MENISCECTOMY Right 2010    EYE SURGERY Right 2008    lasik rt eye    ORIF, ELBOW Left 10/2004    REPAIR, KNEE, ACL Left 08/2002    DENTAL EXTRACTION(S)  1986    wisdom teeth    TESTICLE EXPLORATION  1972    Undescended testicle       CURRENT MEDICATIONS  Home Medications       Reviewed by Vannessa Dowell (Pharmacy Tech) on 10/05/22 at 1011  Med List Status: Complete     Medication Last Dose Status   APPLE CIDER VINEGAR PO 10/4/2022 Active   asa/apap/caffeine (EXCEDRIN) 250-250-65 MG Tab 10/4/2022 Active   Erenumab-aooe (AIMOVIG) 140 MG/ML Solution Auto-injector 9/12/2022 Active   GLUCOSAMINE HCL PO 10/4/2022 Active   hydrOXYzine HCl (ATARAX) 25 MG Tab 10/4/2022 Active   loratadine (CLARITIN) 10 MG Tab 10/5/2022 Active   losartan (COZAAR) 100 MG Tab 10/5/2022 Active   magnesium oxide (MAG-OX) 400 MG Tab 10/4/2022 Active   metFORMIN (GLUCOPHAGE) 500 MG Tab 10/5/2022 Active   omeprazole (PRILOSEC) 20 MG delayed-release capsule 10/5/2022 Active   sildenafil citrate (VIAGRA) 50 MG tablet >3 weeks Active   simvastatin (ZOCOR) 5 MG Tab 10/4/2022 Active   tamsulosin (FLOMAX) 0.4 MG capsule 10/5/2022 Active   Vilazodone HCl 10 MG Tab 10/5/2022 Active                    ALLERGIES  No Known Allergies    PHYSICAL EXAM  VITAL SIGNS: BP (!) 142/96   Pulse 88   Temp 36.4 °C (97.5 °F) (Temporal)   Resp 18   Ht 1.778 m (5' 10\")   Wt 82.6 kg (182 lb 1.6 oz)   SpO2 98%   BMI 26.13 kg/m²    Pulse Oximetry was obtained. It showed a reading of Pulse Oximetry: 98 %.  I interpreted this as nonhypoxic.     Constitutional: Well developed, Well nourished, No acute distress, Non-toxic appearance.   HENT: Normocephalic, Atraumatic, Bilateral external ears " normal, bilateral tympanic membranes normal, Oropharynx moist mucous membranes, No oral exudates, Nose normal.   Eyes:  conjunctiva is normal, there are no signs of exudate.   Neck: Supple, no cervical lymphadenopathy, no meningeal signs..   Lymphatic: No lymphadenopathy noted.   Cardiovascular: Regular rate and rhythm without murmurs gallops or rubs.   Thorax & Lungs: Lungs are clear to auscultation bilaterally, there are no wheezes no rales. Chest wall is nontender.  Abdomen: Soft, nontender nondistended. Bowel sounds are present.   Skin: Warm, Dry, No erythema,   Back: No tenderness, No CVA tenderness.  Musculoskeletal: Good range of motion in all major joints. No tenderness to palpation or major deformities noted. Intact distal pulses, no clubbing, no cyanosis, no edema     Neurologic: Alert & oriented x 3, Normal motor function, Normal sensory function, No focal deficits noted.   Psychiatric: Affect normal, Judgment normal, Mood normal.         RADIOLOGY/PROCEDURES  [id number] VIEW   Final Result         There is a 8 mm irregular calcification projecting over the left UVJ, likely an obstructing UVJ stone.      US-RENAL   Final Result      1.  There is a 1.0 cm stone in the left UVJ/distal ureter.      2.  Mild to moderate left hydronephrosis.          Results for orders placed or performed during the hospital encounter of 10/05/22   URINALYSIS CULTURE, IF INDICATED    Specimen: Urine   Result Value Ref Range    Color Yellow     Character Clear     Specific Gravity <=1.005 <1.035    Ph 6.0 5.0 - 8.0    Glucose Negative Negative mg/dL    Ketones Negative Negative mg/dL    Protein Negative Negative mg/dL    Bilirubin Negative Negative    Nitrite Negative Negative    Leukocyte Esterase Negative Negative    Occult Blood Large (A) Negative    Micro Urine Req Microscopic    CBC WITH DIFFERENTIAL   Result Value Ref Range    WBC 7.6 4.8 - 10.8 K/uL    RBC 4.58 (L) 4.70 - 6.10 M/uL    Hemoglobin 14.0 14.0 - 18.0 g/dL     Hematocrit 41.9 (L) 42.0 - 52.0 %    MCV 91.5 81.4 - 97.8 fL    MCH 30.6 27.0 - 33.0 pg    MCHC 33.4 (L) 33.7 - 35.3 g/dL    RDW 41.9 35.9 - 50.0 fL    Platelet Count 184 164 - 446 K/uL    MPV 11.2 9.0 - 12.9 fL    Neutrophils-Polys 66.40 44.00 - 72.00 %    Lymphocytes 21.90 (L) 22.00 - 41.00 %    Monocytes 8.00 0.00 - 13.40 %    Eosinophils 2.50 0.00 - 6.90 %    Basophils 0.70 0.00 - 1.80 %    Immature Granulocytes 0.50 0.00 - 0.90 %    Nucleated RBC 0.00 /100 WBC    Neutrophils (Absolute) 5.03 1.82 - 7.42 K/uL    Lymphs (Absolute) 1.66 1.00 - 4.80 K/uL    Monos (Absolute) 0.61 0.00 - 0.85 K/uL    Eos (Absolute) 0.19 0.00 - 0.51 K/uL    Baso (Absolute) 0.05 0.00 - 0.12 K/uL    Immature Granulocytes (abs) 0.04 0.00 - 0.11 K/uL    NRBC (Absolute) 0.00 K/uL   COMP METABOLIC PANEL   Result Value Ref Range    Sodium 140 135 - 145 mmol/L    Potassium 4.3 3.6 - 5.5 mmol/L    Chloride 104 96 - 112 mmol/L    Co2 26 20 - 33 mmol/L    Anion Gap 10.0 7.0 - 16.0    Glucose 116 (H) 65 - 99 mg/dL    Bun 22 8 - 22 mg/dL    Creatinine 1.02 0.50 - 1.40 mg/dL    Calcium 9.6 8.4 - 10.2 mg/dL    AST(SGOT) 22 12 - 45 U/L    ALT(SGPT) 18 2 - 50 U/L    Alkaline Phosphatase 93 30 - 99 U/L    Total Bilirubin 0.3 0.1 - 1.5 mg/dL    Albumin 4.3 3.2 - 4.9 g/dL    Total Protein 6.9 6.0 - 8.2 g/dL    Globulin 2.6 1.9 - 3.5 g/dL    A-G Ratio 1.7 g/dL   ESTIMATED GFR   Result Value Ref Range    GFR (CKD-EPI) 82 >60 mL/min/1.73 m 2   URINE MICROSCOPIC (W/UA)   Result Value Ref Range    WBC 0-2 (A) /hpf    RBC 5-10 (A) /hpf    Epithelial Cells Few Few /hpf         COURSE & MEDICAL DECISION MAKING  Pertinent Labs & Imaging studies reviewed. (See chart for details)  Patient presents for evaluation.  The patient did have a distal ureteral 8 mm stone and there is a possibility that it has not moved.  Currently the patient is asymptomatic from the stone itself however I do need to do an ultrasound to evaluate for the signs of hydroureter.  We have  also sent for a urinalysis as well to evaluate his dysuria.  Patient was given a liter bolus of lactated Ringer's laboratory studies are above and are essentially nonconcerning his glucose is slightly elevated at 116 but not significantly elevated urinalysis only has 5-10 white blood cells and 0-2 with few epithelial no bacteria.  This point I do not feel the patient has an infection.  I did briefly speak with Dr. Ruggiero and based on the patient's symptomatology he thinks is most likely irritation from this distal stone and the patient was given options to be admitted to the hospital to have it removed and at this point the patient is just in significant discomfort would like to have it removed so we will admit the patient for further treatment and care and most likely cystoscopy and stenting or removal of the stone.  I spoke with Dr. Wooten hospitalist will admit the patient.    FINAL IMPRESSION  1. Ureterolithiasis        2. Dysuria                    Electronically signed by: Juan Carlos Hernández M.D., 10/5/2022 10:13 AM

## 2022-10-05 NOTE — ED NOTES
ERP at bedside. Pt agrees with plan of care discussed by ERP. AIDET acknowledged with patient. Cindi in low position, side rail up for pt safety. Call light within reach. Will continue to monitor.

## 2022-10-05 NOTE — H&P
Hospital Medicine History & Physical Note    Date of Service  10/5/2022    Primary Care Physician  Pcp Pt States None    Consultants  urology    Specialist Names: Shelly     Code Status  Full Code    Chief Complaint  Chief Complaint   Patient presents with    Dysuria     This am-states dx with kidney stone last week       History of Presenting Illness  Miguel Angel Fernández is a 62 y.o. male who presented 10/5/2022 with pain in the penis.  Mr. Fernández is a past medical history of FRANK, hypertension, diabetes mellitus with 3 previous episodes of ureteral stones 2010 and recently underwent a left sided orchiectomy by Dr. Lynn, urology on 9/23/2022 because of an atrophic testicle.  The pathology was negative for malignancy.  On 9/28/2022 he had left flank pain and presented emergency room where a CT scan revealed a left sided 8 mm distal ureteral calculus with hydroureteral nephrosis.  He saw Dr. Lynn the next day was set up to have stone extraction on October 17 as an outpatient.  He presents back today with symptoms now in his penis which he has significant pain and he thought he might have urinary tract infection.  Here in the emergency room he has a negative urinalysis for infection.  He has had a renal ultrasound emergency room revealing a 1 cm stone in the left UVJ with mild to moderate hydronephrosis.  He will be kept n.p.o. and Dr. Bravo urology on-call for Dr. Lynn has been consulted for stone extraction.  I discussed the plan of care with Dr. Hernández .    Review of Systems  Review of Systems   Constitutional:  Negative for chills and fever.   Respiratory:  Negative for shortness of breath.    Cardiovascular:  Negative for chest pain.   Gastrointestinal:  Negative for nausea and vomiting.   Genitourinary:  Positive for dysuria, flank pain, frequency and urgency. Negative for hematuria.   All other systems reviewed and are negative.    Past Medical History   has a past medical history of Anxiety, Bowel habit  changes, Chickenpox (as child), Closed head injury (2004), Cold (10/25/2018), Depression, Dermatitis, Diabetes ([facility]), Elevated alkaline phosphatase level (12/5/2018), Frequent headaches (1/26/2016), GERD (gastroesophageal reflux disease), [place] measles (as child), Heart burn, Hiatus hernia syndrome, Hyperglycemia (1/26/2016), Hypertension, Incomplete tear of right rotator cuff (10/8/2018), Indigestion, Kidney stone, Major depressive disorder with single episode, in full remission (HCC) (11/22/2010), Psychiatric problem, Right shoulder pain (10/25/2018), Rosacea (1/26/2016), Sleep apnea, and Snoring.    Surgical History   has a past surgical history that includes orif, elbow (Left, 10/2004); repair, knee, acl (Left, 08/2002); septoturbinoplasty (8/17/2010); ventral hernia repair (9/28/2016); meniscectomy (Right, 2010); hip arthroplasty total (Left, 12/30/2016); eye surgery (Right, 2008); testicle exploration (1972); cystoscopy (04/2010); dental extraction(s) (1986); colonoscopy (06/2018); shoulder decompression arthroscopic (Right, 11/1/2018); clavicle distal excision (Right, 11/1/2018); and orchiectomy (Left, 9/23/2022).     Family History  His brother and sister both have had kidney stones    Social History   reports that he quit smoking about 40 years ago. His smoking use included cigarettes. He has a 5.00 pack-year smoking history. He has never used smokeless tobacco. He reports that he does not currently use alcohol. He reports that he does not currently use drugs.    Allergies  No Known Allergies    Medications  Prior to Admission Medications   Prescriptions Last Dose Informant Patient Reported? Taking?   APPLE CIDER VINEGAR PO 10/4/2022 at 2100 Patient Yes No   Sig: Take 3 Capsules by mouth every evening. 3 capsules nightly   Erenumab-aooe (AIMOVIG) 140 MG/ML Solution Auto-injector 9/12/2022 at Unknown Patient Yes No   Sig: Inject 140 mg under the skin every 30 (thirty) days.   GLUCOSAMINE HCL PO 10/4/2022 at  1000 Patient Yes No   Sig: Take 3 Tablets by mouth every day.   Vilazodone HCl 10 MG Tab 10/5/2022 at 0700 Patient Yes No   Sig: Take 10 mg by mouth every day.   asa/apap/caffeine (EXCEDRIN) 250-250-65 MG Tab 10/4/2022 at 1900 Patient Yes No   Sig: Take 2 Tablets by mouth every 6 hours as needed for Headache.   hydrOXYzine HCl (ATARAX) 25 MG Tab 10/4/2022 at 2200 Patient Yes No   Sig: Take 50 mg by mouth at bedtime. Indications: Feeling Anxious   loratadine (CLARITIN) 10 MG Tab 10/5/2022 at 0800 Patient Yes No   Sig: Take 10 mg by mouth every day.   losartan (COZAAR) 100 MG Tab 10/5/2022 at 0700 Patient Yes No   Sig: Take 100 mg by mouth every day. FOR 30 DAYS   magnesium oxide (MAG-OX) 400 MG Tab 10/4/2022 at 1000 Patient Yes No   Sig: Take 400 mg by mouth every day.   metFORMIN (GLUCOPHAGE) 500 MG Tab 10/5/2022 at 0700 Patient No No   Sig: Take 1 Tab by mouth 2 times a day, with meals.   omeprazole (PRILOSEC) 20 MG delayed-release capsule 10/5/2022 at 0800 Patient No No   Sig: TAKE 1 CAPSULE DAILY   Patient taking differently: Take 20 mg by mouth every day. TAKE 1 CAPSULE DAILY   sildenafil citrate (VIAGRA) 50 MG tablet >3 weeks at Unknown Patient No No   Sig: TAKE ONE TABLET BY MOUTH DAILY AS NEEDED FOR ERECTILE DYSFUNCTION   Patient taking differently: Take 50 mg by mouth as needed for Erectile Dysfunction. TAKE ONE TABLET BY MOUTH DAILY AS NEEDED FOR ERECTILE DYSFUNCTION   simvastatin (ZOCOR) 5 MG Tab 10/4/2022 at 2200 Patient Yes No   Sig: Take 5 mg by mouth every evening.   tamsulosin (FLOMAX) 0.4 MG capsule 10/5/2022 at 0700 Patient No No   Sig: Take 1 Capsule by mouth every day.   Patient taking differently: Take 0.4 mg by mouth every day. Pt started on 9/28/2022 for 10 day course      Facility-Administered Medications: None       Physical Exam  Temp:  [36.4 °C (97.5 °F)] 36.4 °C (97.5 °F)  Pulse:  [64-88] 71  Resp:  [18] 18  BP: (128-142)/(87-98) 128/87  SpO2:  [96 %-98 %] 98 %  Blood Pressure: 128/87    Temperature: 36.4 °C (97.5 °F)   Pulse: 71   Respiration: 18   Pulse Oximetry: 98 %       Physical Exam  Vitals and nursing note reviewed.   Constitutional:       General: He is in acute distress.      Appearance: He is not ill-appearing or toxic-appearing.   HENT:      Mouth/Throat:      Mouth: Mucous membranes are dry.      Pharynx: Oropharynx is clear.   Eyes:      General: No scleral icterus.     Conjunctiva/sclera: Conjunctivae normal.   Cardiovascular:      Rate and Rhythm: Normal rate and regular rhythm.   Pulmonary:      Effort: Pulmonary effort is normal.      Breath sounds: Normal breath sounds.   Abdominal:      General: There is no distension.      Tenderness: There is no abdominal tenderness.   Musculoskeletal:      Cervical back: Normal range of motion and neck supple.      Right lower leg: No edema.      Left lower leg: No edema.   Skin:     General: Skin is warm and dry.   Neurological:      General: No focal deficit present.      Mental Status: He is alert and oriented to person, place, and time.   Psychiatric:         Mood and Affect: Mood normal.         Behavior: Behavior normal.         Thought Content: Thought content normal.         Judgment: Judgment normal.       Laboratory:  Recent Labs     10/05/22  1027   WBC 7.6   RBC 4.58*   HEMOGLOBIN 14.0   HEMATOCRIT 41.9*   MCV 91.5   MCH 30.6   MCHC 33.4*   RDW 41.9   PLATELETCT 184   MPV 11.2     Recent Labs     10/05/22  1027   SODIUM 140   POTASSIUM 4.3   CHLORIDE 104   CO2 26   GLUCOSE 116*   BUN 22   CREATININE 1.02   CALCIUM 9.6     Recent Labs     10/05/22  1027   ALTSGPT 18   ASTSGOT 22   ALKPHOSPHAT 93   TBILIRUBIN 0.3   GLUCOSE 116*         No results for input(s): NTPROBNP in the last 72 hours.      No results for input(s): TROPONINT in the last 72 hours.    Imaging:  [id number] VIEW   Final Result         There is a 8 mm irregular calcification projecting over the left UVJ, likely an obstructing UVJ stone.      US-RENAL   Final  Result      1.  There is a 1.0 cm stone in the left UVJ/distal ureter.      2.  Mild to moderate left hydronephrosis.              Assessment/Plan:  Justification for Admission Status  I anticipate this patient is appropriate for observation status at this time because he will undergo stone extraction by Dr. Bravo    Patient will need a Med/Surg bed on MEDICAL service .  The need is secondary to need for IV fluids and pain control. .    * Ureteral stone- (present on admission)  Assessment & Plan  This is his 4th ureteral stone since 2010  It has been symptomatic since 9/28 when he had an ER visit with an 8 mm stone   His symptoms have progressed  UA negative  NPO, Dr. Bravo urology consulted for stone extraction.  IV fluids, antiemetics, and pain control with IV toradol and morphine.     Type 2 diabetes mellitus without complication, without long-term current use of insulin (HCC)- (present on admission)  Assessment & Plan  On metformin as an outpatient  HbA1c is 5.8  With hyperglycemia glucose 116 in the ER  Refrain from sliding scale insulin    Frequent headaches- (present on admission)  Assessment & Plan  He is on Erenumab    HTN (hypertension)- (present on admission)  Assessment & Plan  Continue Cozaar with holding parameters.     FRANK on CPAP- (present on admission)  Assessment & Plan  He has not used his home CPAP in the past few months.      VTE prophylaxis: SCDs/TEDs

## 2022-10-05 NOTE — ASSESSMENT & PLAN NOTE
On metformin as an outpatient  HbA1c is 5.8  With hyperglycemia glucose 116 in the ER  Refrain from sliding scale insulin

## 2022-10-05 NOTE — ASSESSMENT & PLAN NOTE
This is his 4th ureteral stone since 2010  It has been symptomatic since 9/28 when he had an ER visit with an 8 mm stone   His symptoms have progressed  UA negative  NPO, Dr. Bravo urology consulted for stone extraction.  IV fluids, antiemetics, and pain control with IV toradol and morphine.

## 2022-10-05 NOTE — PROGRESS NOTES
4 Eyes Skin Assessment Completed by YESENIA Saldaña and YESENIA Tsai.    Head WDL  Ears WDL  Nose WDL  Mouth WDL  Neck WDL  Breast/Chest WDL  Shoulder Blades WDL  Spine WDL  (R) Arm/Elbow/Hand WDL  (L) Arm/Elbow/Hand WDL  Abdomen WDL  Groin WDL  Scrotum/Coccyx/Buttocks WDL  (R) Leg WDL  (L) Leg WDL  (R) Heel/Foot/Toe WDL  (L) Heel/Foot/Toe WDL      Interventions In Place N/A    Possible Skin Injury No    Pictures Uploaded Into Epic N/A  Wound Consult Placed N/A  RN Wound Prevention Protocol Ordered No

## 2022-10-05 NOTE — ED TRIAGE NOTES
Pt to er with c/o pain with urination this am . States was dx with kidney stone last week, denies blood in urine, n/v,

## 2022-10-05 NOTE — ED NOTES
"2000 cc clear yellow urine emptied from urinals  Pt concerned about voiding too much  Pt reassured that this was okay   He is aware he will be going to surgery around 5 tonight   Called and gave report to Piotr PATTERSON  Pt to 201-1  Per op called and they too were informed pt going to -2 and when pt last eat or drank  Per pt \"around 8 this morning\"  To room via WC   "

## 2022-10-06 VITALS
SYSTOLIC BLOOD PRESSURE: 114 MMHG | HEIGHT: 70 IN | TEMPERATURE: 98.2 F | DIASTOLIC BLOOD PRESSURE: 76 MMHG | RESPIRATION RATE: 18 BRPM | HEART RATE: 64 BPM | BODY MASS INDEX: 26.07 KG/M2 | WEIGHT: 182.1 LBS | OXYGEN SATURATION: 97 %

## 2022-10-06 PROCEDURE — A9270 NON-COVERED ITEM OR SERVICE: HCPCS | Performed by: HOSPITALIST

## 2022-10-06 PROCEDURE — 700105 HCHG RX REV CODE 258: Performed by: HOSPITALIST

## 2022-10-06 PROCEDURE — G0378 HOSPITAL OBSERVATION PER HR: HCPCS

## 2022-10-06 PROCEDURE — 96374 THER/PROPH/DIAG INJ IV PUSH: CPT

## 2022-10-06 PROCEDURE — 700102 HCHG RX REV CODE 250 W/ 637 OVERRIDE(OP): Performed by: HOSPITALIST

## 2022-10-06 PROCEDURE — 700111 HCHG RX REV CODE 636 W/ 250 OVERRIDE (IP): Performed by: HOSPITALIST

## 2022-10-06 PROCEDURE — 99217 PR OBSERVATION CARE DISCHARGE: CPT | Performed by: HOSPITALIST

## 2022-10-06 RX ORDER — AMOXICILLIN AND CLAVULANATE POTASSIUM 875; 125 MG/1; MG/1
1 TABLET, FILM COATED ORAL 2 TIMES DAILY
Qty: 10 TABLET | Refills: 0 | Status: SHIPPED | OUTPATIENT
Start: 2022-10-06 | End: 2022-10-11

## 2022-10-06 RX ADMIN — SODIUM CHLORIDE, POTASSIUM CHLORIDE, SODIUM LACTATE AND CALCIUM CHLORIDE: 600; 310; 30; 20 INJECTION, SOLUTION INTRAVENOUS at 04:10

## 2022-10-06 RX ADMIN — KETOROLAC TROMETHAMINE 15 MG: 30 INJECTION, SOLUTION INTRAMUSCULAR; INTRAVENOUS at 08:25

## 2022-10-06 RX ADMIN — ACETAMINOPHEN 650 MG: 325 TABLET, FILM COATED ORAL at 04:10

## 2022-10-06 RX ADMIN — SENNOSIDES AND DOCUSATE SODIUM 2 TABLET: 50; 8.6 TABLET ORAL at 06:22

## 2022-10-06 RX ADMIN — LOSARTAN POTASSIUM 100 MG: 25 TABLET, FILM COATED ORAL at 06:22

## 2022-10-06 ASSESSMENT — PAIN DESCRIPTION - PAIN TYPE: TYPE: ACUTE PAIN

## 2022-10-06 NOTE — PROGRESS NOTES
Received from  [facility], S/P cystoscopy, aox4, with burning on urination.Needs attended. Plan of care discussed and understood.

## 2022-10-06 NOTE — DISCHARGE SUMMARY
Discharge Summary    CHIEF COMPLAINT ON ADMISSION  Chief Complaint   Patient presents with    Dysuria     This am-states dx with kidney stone last week       Reason for Admission  Urinary Frequency, Bladder pain     Admission Date  10/5/2022    CODE STATUS  Full Code    HPI & HOSPITAL COURSE  Mr. Miguel Angel Fernández is a 62-year-old gentleman comes into the hospital complaining of flank pain.  He was also complaining of dysuria with penile pain.  The patient recently had left-sided orchiectomy because of an atrophied testicle.  Negative for malignancy.  His left flank pain at this point was consistent with CT imaging showing an 8 mm distal ureteral calculus with hydronephrosis.  The patient initially was set up as an outpatient for October 17 to have a cystoscopy with removal of the stone however he would not tolerate the pain and thus had to come into the emergency room.  At this point patient is status post cystoscopy with a ureteral stent insertion.  Patient did undergo lithotripsy using a laser and a retrograde pyelogram as well.  Patient now is doing much better.  Pain is relieved.  Urology at this point has cleared him.  Patient will be able to discharge home with outpatient follow-ups and monitoring and patient will be followed in the urology office next week.  Patient otherwise at this point is stable.  He is alert awake oriented x3 pleasant cooperative gentleman he can discharge home with outpatient monitoring and medications.    Therefore, he is discharged in good and stable condition to home with close outpatient follow-up.    The patient recovered much more quickly than anticipated on admission.    Discharge Date  10/6/2022    FOLLOW UP ITEMS POST DISCHARGE  Follow-up with urology as scheduled  Follow-up with the primary care physician in 2 to 3 days    DISCHARGE DIAGNOSES  Principal Problem:    Ureteral stone POA: Yes  Active Problems:    FRANK on CPAP (Chronic) POA: Yes    HTN (hypertension) POA: Yes    Type 2  diabetes mellitus without complication, without long-term current use of insulin (HCC) POA: Yes    Frequent headaches POA: Yes  Resolved Problems:    * No resolved hospital problems. *      FOLLOW UP  No future appointments.  No follow-up provider specified.    MEDICATIONS ON DISCHARGE     Medication List        START taking these medications        Instructions   amoxicillin-clavulanate 875-125 MG Tabs  Commonly known as: AUGMENTIN   Take 1 Tablet by mouth 2 times a day for 5 days.  Dose: 1 Tablet            CHANGE how you take these medications        Instructions   omeprazole 20 MG delayed-release capsule  What changed:   how much to take  how to take this  when to take this  Commonly known as: PRILOSEC   TAKE 1 CAPSULE DAILY     sildenafil citrate 50 MG tablet  What changed:   how much to take  how to take this  when to take this  reasons to take this  Commonly known as: VIAGRA   TAKE ONE TABLET BY MOUTH DAILY AS NEEDED FOR ERECTILE DYSFUNCTION            CONTINUE taking these medications        Instructions   Aimovig 140 MG/ML Soaj  Generic drug: Erenumab-aooe   Inject 140 mg under the skin every 30 (thirty) days.  Dose: 140 mg     APPLE CIDER VINEGAR PO   Take 3 Capsules by mouth every evening. 3 capsules nightly  Dose: 3 Capsule     asa/apap/caffeine 250-250-65 MG Tabs  Commonly known as: EXCEDRIN   Take 2 Tablets by mouth every 6 hours as needed for Headache.  Dose: 2 Tablet     GLUCOSAMINE HCL PO   Take 3 Tablets by mouth every day.  Dose: 3 Tablet     hydrOXYzine HCl 25 MG Tabs  Commonly known as: ATARAX   Take 50 mg by mouth at bedtime. Indications: Feeling Anxious  Dose: 50 mg     loratadine 10 MG Tabs  Commonly known as: CLARITIN   Take 10 mg by mouth every day.  Dose: 10 mg     losartan 100 MG Tabs  Commonly known as: COZAAR   Take 100 mg by mouth every day. FOR 30 DAYS  Dose: 100 mg     magnesium oxide 400 MG Tabs tablet  Commonly known as: MAG-OX   Take 400 mg by mouth every day.  Dose: 400 mg      metFORMIN 500 MG Tabs  Commonly known as: GLUCOPHAGE   Take 1 Tab by mouth 2 times a day, with meals.  Dose: 500 mg     simvastatin 5 MG Tabs  Commonly known as: ZOCOR   Take 5 mg by mouth every evening.  Dose: 5 mg     Vilazodone HCl 10 MG Tabs   Take 10 mg by mouth every day.  Dose: 10 mg            STOP taking these medications      tamsulosin 0.4 MG capsule  Commonly known as: FLOMAX              Allergies  No Known Allergies    DIET  Orders Placed This Encounter   Procedures    Diet Order Diet: Regular     Standing Status:   Standing     Number of Occurrences:   1     Order Specific Question:   Diet:     Answer:   Regular [1]       ACTIVITY  As tolerated.  Weight bearing as tolerated    CONSULTATIONS  Urology    PROCEDURES  Cystoscopy with ureteral stent insertion and laser lithotripsy and retrograde pyelogram done on 10/5/2022    LABORATORY  Lab Results   Component Value Date    SODIUM 140 10/05/2022    POTASSIUM 4.3 10/05/2022    CHLORIDE 104 10/05/2022    CO2 26 10/05/2022    GLUCOSE 116 (H) 10/05/2022    BUN 22 10/05/2022    CREATININE 1.02 10/05/2022    CREATININE 1.06 10/08/2012        Lab Results   Component Value Date    WBC 7.6 10/05/2022    WBC 5.7 10/08/2012    HEMOGLOBIN 14.0 10/05/2022    HEMATOCRIT 41.9 (L) 10/05/2022    PLATELETCT 184 10/05/2022        Total time of the discharge process exceeds 34 minutes.

## 2022-10-06 NOTE — ANESTHESIA POSTPROCEDURE EVALUATION
Patient: Miguel Angel Fernández    Procedure Summary     Date: 10/05/22 Room / Location:  OR 03 / SURGERY [facility]    Anesthesia Start: 1747 Anesthesia Stop: 1849    Procedures:       CYSTOSCOPY, WITH URETERAL STENT INSERTION (Left: Ureter)      LITHOTRIPSY, USING LASER (Left: Ureter) Diagnosis: (left distal ureteral stone)    Surgeons: Jaylen Bravo M.D. Responsible Provider: Mayco Hirsch M.D.    Anesthesia Type: general ASA Status: 2 - Emergent          Final Anesthesia Type: general  Last vitals  BP   Blood Pressure: (!) 94/60    Temp   36.4 °C (97.5 °F)    Pulse   77   Resp   16    SpO2   100 %      Anesthesia Post Evaluation    Patient location during evaluation: PACU  Patient participation: complete - patient participated  Level of consciousness: awake and alert    Airway patency: patent  Anesthetic complications: no  Cardiovascular status: hemodynamically stable  Respiratory status: acceptable  Hydration status: euvolemic    PONV: none          There were no known notable events for this encounter.     Nurse Pain Score: 0 (NPRS)

## 2022-10-06 NOTE — ANESTHESIA PROCEDURE NOTES
Airway    Date/Time: 10/5/2022 6:04 PM  Performed by: Mayco Hirsch M.D.  Authorized by: Mayco Hirsch M.D.     Location:  OR  Urgency:  Elective  Indications for Airway Management:  Anesthesia      Spontaneous Ventilation: absent    Sedation Level:  Deep  Preoxygenated: Yes    Final Airway Type:  Supraglottic airway  Final Supraglottic Airway:  Standard LMA    SGA Size:  4  Number of Attempts at Approach:  1

## 2022-10-06 NOTE — ANESTHESIA PREPROCEDURE EVALUATION
Case: [id number] Date/Time: 10/05/22 7164    Procedures:       CYSTOSCOPY, WITH URETERAL STENT INSERTION (Left: Ureter)      LITHOTRIPSY, USING LASER (Left: Ureter)    Anesthesia type: General    Pre-op diagnosis: stone in the left UVJ/distal ureter    Location: [facility] OR 03 / SURGERY [facility]    Surgeons: Jaylen Bravo M.D.          Relevant Problems   ANESTHESIA   (positive) FRANK on CPAP      NEURO   (positive) Cervicogenic headache   (positive) Chronic migraine   (positive) Frequent headaches   (positive) Medication overuse headache      CARDIAC   (positive) Chronic migraine   (positive) HTN (hypertension)      GI   (positive) Acid reflux      ENDO   (positive) Type 2 diabetes mellitus without complication, without long-term current use of insulin (HCC)       Physical Exam    Airway   Mallampati: II  TM distance: >3 FB  Neck ROM: full       Cardiovascular - normal exam  Rhythm: regular  Rate: normal  (-) murmur     Dental - normal exam           Pulmonary - normal exam  Breath sounds clear to auscultation     Abdominal    Neurological - normal exam                 Anesthesia Plan    ASA 2- EMERGENT   ASA physical status emergent criteria: compromised vital organ, limb or tissue    Plan - general       Airway plan will be LMA          Induction: intravenous    Postoperative Plan: Postoperative administration of opioids is intended.    Pertinent diagnostic labs and testing reviewed    Informed Consent:    Anesthetic plan and risks discussed with patient.    Use of blood products discussed with: patient whom consented to blood products.

## 2022-10-06 NOTE — OR SURGEON
Immediate Post OP Note    PreOp Diagnosis: left distal ureteral stone      PostOp Diagnosis: same      Procedure(s):  CYSTOSCOPY, WITH URETERAL STENT INSERTION - Wound Class: Clean  LITHOTRIPSY, USING LASER - Wound Class: Clean  RETROGRADE PYELOGRAM    Surgeon(s):  Jaylen Bravo M.D.    Anesthesiologist/Type of Anesthesia:  Anesthesiologist: Mayco Hirsch M.D./General    Surgical Staff:  Circulator: Heather C Cogan, R.N.  Laser Staff: Sanjuanita Wong  Scrub Person: Charly Alcocer    Specimens removed if any:  ID Type Source Tests Collected by Time Destination   A : left ureteral stone Other Other PATHOLOGY SPECIMEN Jaylen Bravo M.D. 10/5/2022  6:18 PM        Estimated Blood Loss: min    Findings: complete frag and removal 8mm stone, no stent    Complications: none        10/5/2022 6:28 PM Jaylen Bravo M.D.

## 2022-10-06 NOTE — OP REPORT
DATE OF SERVICE:  10/05/2022     NAME OF OPERATIONS:  Left ureteroscopy with laser lithotripsy and left   retrograde pyelogram.     PREOPERATIVE DIAGNOSIS:  Left distal ureteral stone.     POSTOPERATIVE DIAGNOSIS:  Left distal ureteral stone.     PRIMARY SURGEON:  Jaylen Bravo MD     ANESTHESIOLOGIST:  Mayco Hirsch MD     FINDINGS:  Complete fragmentation and removal of 8-mm left distal ureteral   stone.     INDICATIONS:  Briefly, the patient is a 62-year-old gentleman with a known   left distal ureteral stone, who presents to the emergency department with   ongoing colic.  Upon consideration of options, he elected to undergo surgical   management today.  Informed consent has been obtained.     OPERATION IN DETAIL:  The patient was taken to the operating room and placed   on the operating table in supine position.  After administration of general   anesthetic, he was placed in lithotomy.  Genitals were prepped and draped   sterilely.  A 20-Bhutanese cystoscope was passed in the bladder with visualizing   obturator.  He had a slightly elevated bladder neck.  Bladder was within   normal limits.  Left ureteral orifice was identified and a 0.035 guidewire was   passed under fluoroscopic guidance.     A rigid ureteroscope was then passed alongside the wire to the level of the   stone, which was just inside the ureteral orifice.  Here, 242 micron holmium   laser fiber was employed to fragment the stone into perhaps a dozen pieces.     A stone basket was then utilized to sweep all of these pieces free.     A 6-Bhutanese open-ended ureteral catheter was then used to shoot approximately 8   mL of half strength contrast retrograde.  There was a normal course and   caliber of the ureter.  With removal of the catheter, there was complete and   brisk efflux of all contrast material.  A decision was made not to leave a   stent.     Stone fragments were retrieved for stone analysis.  Bladder was drained, the   case concluded.  He tolerated the procedure well and was taken to recovery room   in stable condition.  It is our intention the patient will be able to be   discharged home later this evening.        ______________________________  Jaylen Bravo MD    [facility] [place]:  10/05/2022 18:32  DT:  10/05/2022 18:42    Job#:  [id number]

## 2022-10-06 NOTE — CONSULTS
UROLOGY CONSULT (dictated)    Requested by: dr valle  Reason:  ongoing colic due to 8mm left distal ureteral stone    Recommendations:  to OR for management.  I have explained to the patient the indications for this procedure, and we have discussed the customary risks, which include but are not limited to, bleeding, infection, damage to local structures.  The patient expresses adequate understanding and gives informed consent.

## 2022-10-06 NOTE — PROGRESS NOTES
Pt provided discharge teaching. Pt provided urology follow up information. Pt provided post operation teaching. Pt made aware of indications for prescribed medications and possible side effects. Pt aware of where to pick of medications. Answered all patient questions, PIV removed. End of care. Pt states that he lives a mile away and intends to walk home.

## 2022-10-06 NOTE — DISCHARGE INSTRUCTIONS
Discharge Instructions    Discharged to home by car with relative. Discharged via wheelchair, hospital escort: Refused.  Special equipment needed: Not Applicable    Be sure to schedule a follow-up appointment with your primary care doctor or any specialists as instructed.     Discharge Plan:   Diet Plan: Discussed  Activity Level: Discussed  Confirmed Follow up Appointment: Patient to Call and Schedule Appointment  Confirmed Symptoms Management: Discussed  Medication Reconciliation Updated: No (Comments)  Influenza Vaccine Indication: Patient Refuses    I understand that a diet low in cholesterol, fat, and sodium is recommended for good health. Unless I have been given specific instructions below for another diet, I accept this instruction as my diet prescription.   Other diet: regular      Special Instructions: None    -Is this patient being discharged with medication to prevent blood clots?  No    Is patient discharged on Warfarin / Coumadin?   No

## 2022-10-06 NOTE — OR NURSING
1833: Pt arrived to PACU. Respirations even and unlabored. VSS. Dressing clean, dry and intact, oral airway in place.    1839: opens eyes to verbal stimuli, oral airway removed, able to move all extremities    1848: Respirations even and unlabored, pain 0/10 and tolerable, denies nausea, tolerating water, speaking in complete sentences, answering questions appropriately     1903: Respirations even and unlabored, pain 3/10 burning with urination, pain tolerable, denies nausea, tolerating water, brother updated, meets criteria for [facility]    1915: Report given to Audra PATTERSON    1925: transported to [facility]

## 2022-10-06 NOTE — ANESTHESIA TIME REPORT
Anesthesia Start and Stop Event Times     Date Time Event    10/5/2022 1747 Anesthesia Start     1757 Ready for Procedure     1858 Anesthesia Stop        Responsible Staff  10/05/22    Name Role Begin End    Mayco Hirsch M.D. Anesth 1747 1858        Overtime Reason:  overtime    Comments:

## 2022-10-06 NOTE — PROGRESS NOTES
4 Eyes Skin Assessment Completed with Wilbert.    Head WDL  Ears WDL  Nose WDL  Mouth WDL  Neck WDL  Breast/Chest WDL  Shoulder Blades WDL  Spine WDL  (R) Arm/Elbow/Hand WDL  (L) Arm/Elbow/Hand WDL  Abdomen WDL  Groin WDL/ Pubis: bruising  Scrotum: Bruising R side  (R) Leg WDL  (L) Leg WDL  (R) Heel/Foot/Toe WDL  (L) Heel/Foot/Toe WDL          Devices In Places Blood Pressure Cuff and Pulse Ox      Interventions In Place Pillows and Pressure Redistribution Mattress    Possible Skin Injury No    Pictures Uploaded Into Epic N/A  Wound Consult Placed N/A  RN Wound Prevention Protocol Ordered No

## 2022-10-06 NOTE — CONSULTS
DATE OF SERVICE:  10/05/2022     UROLOGY CONSULTATION     Urology service was consulted by Dr. Hernández from emergency Department for   advice and opinion regarding this gentleman ongoing left renal colic.     HISTORY OF PRESENT ILLNESS:  The patient was diagnosed with a kidney stone   just about a week ago.  After his initial pain episode, he has been doing   relatively well until earlier today where he had the onset of intense urinary   symptoms suggestive of urinary tract infection.  He presented to the emergency   department for this.  No nausea or vomiting.  No fevers or chills.  Imaging   demonstrated the presence of a persistent stone now, perhaps a bit more distal   on the left side near the left UVJ.  A urinalysis is not suggestive of   infection or other clinical parameters.     For detailed account the patient's past medical, surgical, social history and   review of systems, please see Dr. Wooten's history and physical dated today   in the patient's chart.     FOCUSED PHYSICAL EXAMINATION:  GENERAL:  Well-nourished, well-developed gentleman, lying in [facility],   in no acute distress.  VITAL SIGNS:  Afebrile.  Vital signs stable.  ABDOMEN:  No abdominal tenderness.     LABORATORY DATA:  White blood cell count 7.6, creatinine 1.0.  Urinalysis   notable for a few red cells.     KUB and renal ultrasound reviewed as above.     IMPRESSION AND PLAN:  A 62-year-old gentleman with a bounce back secondary to   an 8 mm left distal ureteral stone.  He was scheduled to have surgery with Dr. Lynn in approximately 2 weeks' time, but given the persistence of his   symptoms, options for moving the surgery up were discussed and he wished to   proceed.  I will perform left ureteroscopic laser lithotripsy with possible   placement of a stent.  He understands this procedure as well its risks include   but not limited to bleeding, infection, injury to urinary tract, use of a   stent, stent colic, absolute need for  followup.  He expressed adequate   understanding and wished to proceed.        ______________________________  Jaylen Bravo MD MCM/KATINA    DD:  10/05/2022 17:59  DT:  10/05/2022 18:17    Job#:  [id number]

## 2022-10-06 NOTE — CARE PLAN
The patient is Stable - Low risk of patient condition declining or worsening    Shift Goals  Clinical Goals: monitor for complications from procedure  Patient Goals: sleep through the night  Family Goals: n/a    Progress made toward(s) clinical / shift goals:  still with dysuria, no bleeding or untoward events, slept for hours.      Problem: Knowledge Deficit - Standard  Goal: Patient and family/care givers will demonstrate understanding of plan of care, disease process/condition, diagnostic tests and medications  Outcome: Progressing     Problem: Pain - Standard  Goal: Alleviation of pain or a reduction in pain to the patient’s comfort goal  Outcome: Progressing

## 2022-10-07 LAB
BACTERIA UR CULT: NORMAL
SIGNIFICANT IND 70042: NORMAL
SITE SITE: NORMAL
SOURCE SOURCE: NORMAL

## 2022-10-11 LAB
APPEARANCE STONE: NORMAL
COMPN STONE: NORMAL
SPECIMEN WT: 55 MG

## 2022-10-25 LAB — PATHOLOGY CONSULT NOTE: NORMAL

## 2022-11-10 ENCOUNTER — HOSPITAL ENCOUNTER (EMERGENCY)
Facility: MEDICAL CENTER | Age: 62
End: 2022-11-10
Attending: EMERGENCY MEDICINE
Payer: MEDICAID

## 2022-11-10 VITALS
HEART RATE: 89 BPM | HEIGHT: 70 IN | WEIGHT: 181.44 LBS | BODY MASS INDEX: 25.98 KG/M2 | OXYGEN SATURATION: 98 % | TEMPERATURE: 98.6 F | SYSTOLIC BLOOD PRESSURE: 140 MMHG | DIASTOLIC BLOOD PRESSURE: 102 MMHG | RESPIRATION RATE: 16 BRPM

## 2022-11-10 DIAGNOSIS — F41.9 ANXIETY: ICD-10-CM

## 2022-11-10 DIAGNOSIS — F41.0 PANIC ATTACK: ICD-10-CM

## 2022-11-10 PROCEDURE — A9270 NON-COVERED ITEM OR SERVICE: HCPCS | Performed by: EMERGENCY MEDICINE

## 2022-11-10 PROCEDURE — 700102 HCHG RX REV CODE 250 W/ 637 OVERRIDE(OP): Performed by: EMERGENCY MEDICINE

## 2022-11-10 PROCEDURE — 99283 EMERGENCY DEPT VISIT LOW MDM: CPT

## 2022-11-10 RX ORDER — LORAZEPAM 0.5 MG/1
0.5 TABLET ORAL ONCE
Status: COMPLETED | OUTPATIENT
Start: 2022-11-10 | End: 2022-11-10

## 2022-11-10 RX ADMIN — LORAZEPAM 0.5 MG: 0.5 TABLET ORAL at 20:07

## 2022-11-10 ASSESSMENT — FIBROSIS 4 INDEX: FIB4 SCORE: 1.8

## 2022-11-11 NOTE — ED NOTES
Pt requesting nurse to dispose of his medications that he no longer wants to take.   Disposed of pt's meloxicam, vilazodone hydrochloride, and robaxin appropriately at this time.

## 2022-11-11 NOTE — ED TRIAGE NOTES
Pt comes in c/o racing thoughts  having increased panic attacks and cant truly focus   has Hx of TBI 2004  this is when his panic attacks started   getting worse this last week  here for assistance  denies self harm of SI

## 2022-11-11 NOTE — ED PROVIDER NOTES
ED Provider Note    CHIEF COMPLAINT  Chief Complaint   Patient presents with    Anxiety     Has Hx of anxiety   the last about a week ago   panic attacks     HPI  Mr. Fernández is a 62-year-old male with past medical history of depression who presents emergency room concerned about possible ramping up of his anxiety, having frequent panic attacks today and admitted for assessment.  The patient had been on a new psychiatric medication and was having increasing thoughts of panic and depressive symptoms.  He called his therapist to change this medication back to Paxil 10 mg and he has been working on getting outside and trying to stay active.  He says he is not having any more racing thoughts at the time and I am talking to him and does not have any active suicidality or homicidality.  He has not taken any medications with intent to self-harm, he does not have any pervasive thoughts at this time.  He has been taking Robaxin and meloxicam for some acute on chronic hip pain.  Denies alcohol use, denies any recreational drug use.    PPE Note: I personally donned full PPE for all patient encounters during this visit, including being clean-shaven with an N95 respirator mask, gloves, and goggles.    REVIEW OF SYSTEMS  See HPI for further details. All other systems are negative.     PAST MEDICAL HISTORY   has a past medical history of Anxiety, Bowel habit changes, Chickenpox (as child), Closed head injury (2004), Cold (10/25/2018), Depression, Dermatitis, Diabetes ([facility]), Elevated alkaline phosphatase level (12/5/2018), Frequent headaches (1/26/2016), GERD (gastroesophageal reflux disease), Pasht[place] measles (as child), Heart burn, Hiatus hernia syndrome, Hyperglycemia (1/26/2016), Hypertension, Incomplete tear of right rotator cuff (10/8/2018), Indigestion, Kidney stone, Major depressive disorder with single episode, in full remission (HCC) (11/22/2010), Psychiatric problem, Right shoulder pain (10/25/2018), Rosacea (1/26/2016),  Sleep apnea, and Snoring.    SOCIAL HISTORY  Social History     Tobacco Use    Smoking status: Former     Packs/day: 1.00     Years: 5.00     Pack years: 5.00     Types: Cigarettes     Quit date: 1982     Years since quittin.8    Smokeless tobacco: Never    Tobacco comments:     quit 35 years ago   Vaping Use    Vaping Use: Never used   Substance and Sexual Activity    Alcohol use: Not Currently     Alcohol/week: 0.0 oz     Comment: rare     Drug use: Not Currently    Sexual activity: Not Currently       SURGICAL HISTORY   has a past surgical history that includes orif, elbow (Left, 10/2004); repair, knee, acl (Left, 2002); septoturbinoplasty (2010); ventral hernia repair (2016); meniscectomy (Right, ); hip arthroplasty total (Left, 2016); eye surgery (Right, ); testicle exploration (); cystoscopy (2010); dental extraction(s) (); colonoscopy (2018); shoulder decompression arthroscopic (Right, 2018); clavicle distal excision (Right, 2018); orchiectomy (Left, 2022); cystoscopy,insert ureteral stent (Left, 10/5/2022); and lasertripsy (Left, 10/5/2022).    CURRENT MEDICATIONS  Home Medications       Reviewed by Marta Bryant R.N. (Registered Nurse) on 11/10/22 at 1823  Med List Status: Partial     Medication Last Dose Status   APPLE CIDER VINEGAR PO  Active   asa/apap/caffeine (EXCEDRIN) 250-250-65 MG Tab  Active   Erenumab-aooe (AIMOVIG) 140 MG/ML Solution Auto-injector  Active   GLUCOSAMINE HCL PO  Active   hydrOXYzine HCl (ATARAX) 25 MG Tab  Active   loratadine (CLARITIN) 10 MG Tab  Active   losartan (COZAAR) 100 MG Tab  Active   magnesium oxide (MAG-OX) 400 MG Tab  Active   metFORMIN (GLUCOPHAGE) 500 MG Tab  Active   omeprazole (PRILOSEC) 20 MG delayed-release capsule  Active   sildenafil citrate (VIAGRA) 50 MG tablet  Active   simvastatin (ZOCOR) 5 MG Tab  Active   Vilazodone HCl 10 MG Tab  Active                  ALLERGIES  No Known  "Allergies    PHYSICAL EXAM  VITAL SIGNS: BP (!) 140/102   Pulse 89   Temp 37 °C (98.6 °F) (Temporal)   Resp 16   Ht 1.778 m (5' 10\")   Wt 82.3 kg (181 lb 7 oz)   SpO2 98%   BMI 26.03 kg/m²   Pulse ox interpretation: I interpret this pulse ox as normal.  Genl: M sitting in chair comfortably, speaking clearly, appears in no acute distress.  Slightly anxious  Head: NC/AT   ENT: Mucous membranes moist, posterior pharynx clear, uvula midline, nares patent bilaterally   Pulmonary: Lungs are clear to auscultation bilaterally  CV:  RRR, no murmur appreciated  Abdomen: soft, NT/ND; no rebound/guarding  Musculoskeletal: Pain free ROM of the neck. Moving upper and lower extremities and spontaneous in coordinated fashion  Psych: Overthinking, no SI or HI, no active delusions, intact insight and judgment  Skin: No rash or lesions.  No pallor or jaundice.  No cyanosis.  Warm and dry.     DIAGNOSTIC STUDIES / PROCEDURES    COURSE & MEDICAL DECISION MAKING  Pertinent Labs & Imaging studies reviewed. (See chart for details)  MDM  Initial evaluation at 1959:  Patient presents emergency room for symptoms as described above.  The patient is alert, oriented and is concerned about his medication changes and his intermittent racing thoughts that have since subsided.  He does not have any active suicidality or homicidality, he does not meet criteria for legal hold.  He is extremely anxious on my initial assessment and was hypertensive however repeat after he was able to be verbally de-escalated was slightly elevated though much more reassuring.  He does not have any other physical pain complaints, he would like to turn in some of his old medications and no longer wants to take his meloxicam, Robaxin/virazadone    Patient was given the half tablet of Ativan and vital signs were reassessed.  He remains calm and collected and we had extensive discussion regarding the need for outpatient psychiatric and counseling which she has an " appointment on November 22.  He is also encouraged to maintain daily exercise, to go out and engage in social exercises and is given very strict return complaints should he have any racing thoughts, thoughts of self-harm or harm to others or any other substance abuse problems.  Questions are addressed and he is discharged home in stable condition.    FINAL IMPRESSION  Visit Diagnoses     ICD-10-CM   1. Anxiety  F41.9   2. Panic attack  F41.0       Electronically signed by: Travis Chaney M.D., 11/10/2022 7:59 PM

## 2022-11-11 NOTE — ED NOTES
Pt resting in chair , states anxiety has eased off some since arriving.  Pt concerned that his antidepressants/anxiety/muscle relaxers are having negative effect on his mental state. Unsure of what combo to use and unable to see his therapist until 11/20. Feels safe at home but anxious. Updated pt on waiting for provider at this time and offered blanket. Call light within reach.

## 2022-11-12 ENCOUNTER — HOSPITAL ENCOUNTER (EMERGENCY)
Facility: MEDICAL CENTER | Age: 62
End: 2022-11-13
Attending: EMERGENCY MEDICINE
Payer: MEDICAID

## 2022-11-12 DIAGNOSIS — R45.851 SUICIDAL IDEATION: ICD-10-CM

## 2022-11-12 DIAGNOSIS — F32.A DEPRESSION, UNSPECIFIED DEPRESSION TYPE: ICD-10-CM

## 2022-11-12 DIAGNOSIS — F41.9 ANXIETY: ICD-10-CM

## 2022-11-12 LAB
AMPHET UR QL SCN: NEGATIVE
BARBITURATES UR QL SCN: NEGATIVE
BENZODIAZ UR QL SCN: NEGATIVE
BZE UR QL SCN: NEGATIVE
CANNABINOIDS UR QL SCN: NEGATIVE
GLUCOSE BLD STRIP.AUTO-MCNC: 144 MG/DL (ref 65–99)
METHADONE UR QL SCN: NEGATIVE
OPIATES UR QL SCN: NEGATIVE
OXYCODONE UR QL SCN: NEGATIVE
PCP UR QL SCN: NEGATIVE
POC BREATHALIZER: 0 PERCENT (ref 0–0.01)
PROPOXYPH UR QL SCN: NEGATIVE

## 2022-11-12 PROCEDURE — 700102 HCHG RX REV CODE 250 W/ 637 OVERRIDE(OP): Performed by: STUDENT IN AN ORGANIZED HEALTH CARE EDUCATION/TRAINING PROGRAM

## 2022-11-12 PROCEDURE — 80307 DRUG TEST PRSMV CHEM ANLYZR: CPT

## 2022-11-12 PROCEDURE — 99285 EMERGENCY DEPT VISIT HI MDM: CPT

## 2022-11-12 PROCEDURE — A9270 NON-COVERED ITEM OR SERVICE: HCPCS | Performed by: STUDENT IN AN ORGANIZED HEALTH CARE EDUCATION/TRAINING PROGRAM

## 2022-11-12 PROCEDURE — 302970 POC BREATHALIZER: Performed by: EMERGENCY MEDICINE

## 2022-11-12 PROCEDURE — 82962 GLUCOSE BLOOD TEST: CPT

## 2022-11-12 RX ORDER — SILDENAFIL 50 MG/1
50 TABLET, FILM COATED ORAL
COMMUNITY

## 2022-11-12 RX ORDER — LOSARTAN POTASSIUM 25 MG/1
100 TABLET ORAL
Status: DISCONTINUED | OUTPATIENT
Start: 2022-11-13 | End: 2022-11-13 | Stop reason: HOSPADM

## 2022-11-12 RX ORDER — PAROXETINE 10 MG/1
10 TABLET, FILM COATED ORAL EVERY MORNING
COMMUNITY

## 2022-11-12 RX ORDER — LORAZEPAM 1 MG/1
1 TABLET ORAL EVERY 12 HOURS PRN
Status: DISCONTINUED | OUTPATIENT
Start: 2022-11-12 | End: 2022-11-13 | Stop reason: HOSPADM

## 2022-11-12 RX ORDER — LORAZEPAM 1 MG/1
1 TABLET ORAL EVERY 4 HOURS PRN
COMMUNITY

## 2022-11-12 RX ORDER — PAROXETINE 10 MG/1
10 TABLET, FILM COATED ORAL EVERY MORNING
Status: DISCONTINUED | OUTPATIENT
Start: 2022-11-13 | End: 2022-11-13 | Stop reason: HOSPADM

## 2022-11-12 RX ORDER — MELOXICAM 7.5 MG/1
7.5 TABLET ORAL DAILY
COMMUNITY

## 2022-11-12 RX ORDER — OMEPRAZOLE 20 MG/1
20 CAPSULE, DELAYED RELEASE ORAL EVERY MORNING
Status: DISCONTINUED | OUTPATIENT
Start: 2022-11-13 | End: 2022-11-13 | Stop reason: HOSPADM

## 2022-11-12 RX ORDER — SIMVASTATIN 10 MG
5 TABLET ORAL NIGHTLY
Status: DISCONTINUED | OUTPATIENT
Start: 2022-11-12 | End: 2022-11-13 | Stop reason: HOSPADM

## 2022-11-12 RX ORDER — HYDROXYZINE HYDROCHLORIDE 25 MG/1
50 TABLET, FILM COATED ORAL
Status: DISCONTINUED | OUTPATIENT
Start: 2022-11-13 | End: 2022-11-13 | Stop reason: HOSPADM

## 2022-11-12 RX ORDER — HYDROXYZINE HYDROCHLORIDE 25 MG/1
25 TABLET, FILM COATED ORAL ONCE
Status: COMPLETED | OUTPATIENT
Start: 2022-11-12 | End: 2022-11-12

## 2022-11-12 RX ORDER — METHOCARBAMOL 500 MG/1
750 TABLET, FILM COATED ORAL EVERY 8 HOURS PRN
Status: DISCONTINUED | OUTPATIENT
Start: 2022-11-12 | End: 2022-11-12

## 2022-11-12 RX ORDER — OMEPRAZOLE 20 MG/1
20 CAPSULE, DELAYED RELEASE ORAL EVERY MORNING
COMMUNITY

## 2022-11-12 RX ORDER — METHOCARBAMOL 750 MG/1
750 TABLET, FILM COATED ORAL EVERY 8 HOURS
COMMUNITY

## 2022-11-12 RX ADMIN — HYDROXYZINE HYDROCHLORIDE 25 MG: 25 TABLET, FILM COATED ORAL at 19:14

## 2022-11-12 RX ADMIN — METFORMIN HYDROCHLORIDE 500 MG: 500 TABLET ORAL at 20:51

## 2022-11-12 RX ADMIN — LORAZEPAM 1 MG: 1 TABLET ORAL at 20:51

## 2022-11-12 RX ADMIN — SIMVASTATIN 5 MG: 10 TABLET, FILM COATED ORAL at 20:26

## 2022-11-12 ASSESSMENT — PAIN DESCRIPTION - PAIN TYPE: TYPE: ACUTE PAIN

## 2022-11-12 ASSESSMENT — FIBROSIS 4 INDEX: FIB4 SCORE: 1.8

## 2022-11-12 NOTE — ED PROVIDER NOTES
"ED Provider Note    CHIEF COMPLAINT  Chief Complaint   Patient presents with    Suicidal Ideation     Pt. Reports he has been feeling depressed and states \"my mind is taking me to bad places\". Pt. Reports SI with out plan. Denies HI. Pt. Is tearful on triage.        HPI  Miguel Angel Fernández is a 62 y.o. male who presents the ED with complaints of feeling depressed.  Patient has been having depression depressive thoughts since 2003.  The patient has seen psychiatrist in the past.  The patient was just recently here as well because of anxiety and panic attacks.  The patient was given a dose of Ativan at the time states that it made him feel better now they increased his Paxil up to 20 mg and he states that he is just having a flight of thoughts and is feeling very suicidal today.  Patient has no plan does not have intent actually wants to live does not like feeling this way but he presents back to the emergency department because he feels like he is at crisis.    REVIEW OF SYSTEMS  See HPI for further details. All other systems are negative.     PAST MEDICAL HISTORY  Past Medical History:   Diagnosis Date    Anxiety     Bowel habit changes     10/25/18-resolved    Chickenpox as child    Closed head injury 2004    fell off ladder.    Cold 10/25/2018    States a little laryngitis and sore throat in past couple days. Denies fevers.     Depression     Dermatitis     to arms and face and pt pick at and scratches sites.    Diabetes (HCC)     orals meds. 10/25/18-pt does not check glucose.     Elevated alkaline phosphatase level 12/5/2018    Frequent headaches 1/26/2016    10/25/18-twice daily.    GERD (gastroesophageal reflux disease)     [place] measles as child    Heart burn     treated with omeprazole    Hiatus hernia syndrome     Hyperglycemia 1/26/2016    Hypertension     Incomplete tear of right rotator cuff 10/8/2018    Indigestion     treated with omeprazole.    Kidney stone     Major depressive disorder with single " episode, in full remission (HCC) 2010    Psychiatric problem     states depression/anxiety    Right shoulder pain 10/25/2018    Rosacea 2016    Sleep apnea     cpap    Snoring        FAMILY HISTORY  Family History   Problem Relation Age of Onset    Diabetes Father     Heart Attack Father 60    Cancer Neg Hx     Suicide Attempts Neg Hx      Patient's family history has been discussed and is been found to be noncontributory to his present illness  SOCIAL HISTORY  Social History     Socioeconomic History    Marital status: Single   Tobacco Use    Smoking status: Former     Packs/day: 1.00     Years: 5.00     Pack years: 5.00     Types: Cigarettes     Quit date: 1982     Years since quittin.8    Smokeless tobacco: Never    Tobacco comments:     quit 35 years ago   Vaping Use    Vaping Use: Never used   Substance and Sexual Activity    Alcohol use: Not Currently     Alcohol/week: 0.0 oz     Comment: rare     Drug use: Not Currently    Sexual activity: Not Currently   Social History Narrative    Patient is  with an adult son. He works full time as a       Pcp Pt States None        SURGICAL HISTORY  Past Surgical History:   Procedure Laterality Date    ME CYSTOSCOPY,INSERT URETERAL STENT Left 10/5/2022    Procedure: CYSTOSCOPY, WITH URETERAL STENT INSERTION;  Surgeon: Jaylen Bravo M.D.;  Location: [facility];  Service: Urology    LASERTRIPSY Left 10/5/2022    Procedure: LITHOTRIPSY, USING LASER;  Surgeon: Jaylen Bravo M.D.;  Location: [facility];  Service: Urology    ORCHIECTOMY Left 2022    Procedure: LEFT SCROTAL ORCHIECTOMY FOR ATROPHIC TESTICLE AND PAIN;  Surgeon: Miguel Angel Lynn M.D.;  Location: [facility];  Service: Urology    SHOULDER DECOMPRESSION ARTHROSCOPIC Right 2018    Procedure: SHOULDER DECOMPRESSION ARTHROSCOPIC- SUBACROMIAL;  Surgeon: Nic Thompson M.D.;  Location: [facility];  Service:  Orthopedics    CLAVICLE DISTAL EXCISION Right 11/1/2018    Procedure: CLAVICLE DISTAL EXCISION;  Surgeon: Nic Thompson M.D.;  Location: SURGERY [facility];  Service: Orthopedics    COLONOSCOPY  06/2018    hx x3    HIP ARTHROPLASTY TOTAL Left 12/30/2016    Procedure: HIP ARTHROPLASTY TOTAL;  Surgeon: Dc Sanchez M.D.;  Location: SURGERY [facility];  Service:     VENTRAL HERNIA REPAIR  9/28/2016    Procedure: VENTRAL HERNIA REPAIR W/MESH;  Surgeon: Corona Delvalle M.D.;  Location: SURGERY [facility];  Service:     SEPTOTURBINOPLASTY  8/17/2010    Performed by VERONICA FRIEDMAN at SURGERY SAME DAY [facility] ORS    CYSTOSCOPY  04/2010    with lithotripsy for kidney stone    MENISCECTOMY Right 2010    EYE SURGERY Right 2008    lasik rt eye    ORIF, ELBOW Left 10/2004    REPAIR, KNEE, ACL Left 08/2002    DENTAL EXTRACTION(S)  1986    wisdom teeth    TESTICLE EXPLORATION  1972    Undescended testicle       CURRENT MEDICATIONS  Home Medications       Reviewed by Vannessa De La Cruz (Pharmacy Tech) on 11/12/22 at 1620  Med List Status: Complete     Medication Last Dose Status   asa/apap/caffeine (EXCEDRIN) 250-250-65 MG Tab 11/11/2022 Active   Erenumab-aooe (AIMOVIG) 140 MG/ML Solution Auto-injector 11/11/2022 Active   GLUCOSAMINE HCL PO 11/12/2022 Active   hydrOXYzine HCl (ATARAX) 25 MG Tab 11/12/2022 Active   loratadine (CLARITIN) 10 MG Tab 11/12/2022 Active   LORazepam (ATIVAN) 1 MG Tab 11/10/2022 Active   losartan (COZAAR) 100 MG Tab 11/12/2022 Active   magnesium oxide (MAG-OX) 400 MG Tab 11/12/2022 Active   meloxicam (MOBIC) 7.5 MG Tab 11/9/2022 Active   metFORMIN (GLUCOPHAGE) 500 MG Tab 11/12/2022 Active   methocarbamol (ROBAXIN) 750 MG Tab 11/9/2022 Active   omeprazole (PRILOSEC) 20 MG delayed-release capsule 11/12/2022 Active   PARoxetine (PAXIL) 10 MG Tab 11/12/2022 Active   sildenafil citrate (VIAGRA) 50 MG tablet LAST WEEK Active   simvastatin (ZOCOR) 5 MG Tab 11/11/2022 Active   Vilazodone  "HCl 10 MG Tab 11/7/2022 Active                    ALLERGIES  No Known Allergies    PHYSICAL EXAM  VITAL SIGNS: BP (!) 145/119   Pulse 80   Temp 36.6 °C (97.9 °F) (Temporal)   Resp 20   Ht 1.778 m (5' 10\")   Wt 81.9 kg (180 lb 8.9 oz)   SpO2 96%   BMI 25.91 kg/m²   Constitutional: Well developed, Well nourished, No acute distress, Non-toxic appearance.  Anxious in appearance  HENT: Normocephalic, Atraumatic, Bilateral external ears normal, Oropharynx moist, No oral exudates, Nose normal.   Eyes: PERRLA, EOMI, Conjunctiva normal, No discharge.   Neck: Normal range of motion, No tenderness, Supple, No stridor.   Cardiovascular: Normal heart rate, Normal rhythm, No murmurs, No rubs, No gallops.   Thorax & Lungs: Normal breath sounds, No respiratory distress, No wheezing, No chest tenderness.  Abdomen: Bowel sounds normal, Soft, No tenderness, No masses, No pulsatile masses.    Skin: Warm, Dry, No erythema, No rash.   Extremities: Intact distal pulses, No edema, No tenderness, No cyanosis, No clubbing.   Neurologic: Certain oriented to person place time and event moving all 4 extremities grossly intact  Psychiatric: As above anxious        RADIOLOGY/PROCEDURES  Results for orders placed or performed during the hospital encounter of 11/12/22   Urine Drug Screen   Result Value Ref Range    Amphetamines Urine Negative Negative    Barbiturates Negative Negative    Benzodiazepines Negative Negative    Cocaine Metabolite Negative Negative    Methadone Negative Negative    Opiates Negative Negative    Oxycodone Negative Negative    Phencyclidine -Pcp Negative Negative    Propoxyphene Negative Negative    Cannabinoid Metab Negative Negative   POC BREATHALIZER   Result Value Ref Range    POC Breathalizer 0.000 0.00 - 0.01 Percent         COURSE & MEDICAL DECISION MAKING  Pertinent Labs & Imaging studies reviewed. (See chart for details)  Patient presents for evaluation.  Clinically the patient is otherwise very anxious he " does complain of suicidal thoughts.  Urine tox screen and breathalyzer were negative.  I will place the patient on observation status for psychiatric evaluation and most likely for transfer to an appropriate psychiatric facility.  I will sign this patient out to my partner for further treatment and care.    Patient admitted to ED Observation pending psychiatric placement at 4:43 PM 11/12/22.    ED Observation Note:  Patient admitted to ED Observation pending psychiatric placement at 4:43 PM 11/12/22.for   1. Suicidal ideation    2. Depression, unspecified depression type    3. Anxiety          Family history of not significant for suicidality otherwise as above      6:44 PM awaiting for psychiatric/[facility] evaluation.  Patient is otherwise resting comfortably.          FINAL IMPRESSION  1. Suicidal ideation        2. Depression, unspecified depression type        3. Anxiety              Electronically signed by: Juan Carlos Hernández M.D., 11/12/2022 3:55 PM

## 2022-11-12 NOTE — ED NOTES
Pharmacy at bedside.   Pt given water in hopes of being able to give urine sample.     Room assessed for pt safety.   Sitter outside room in direct sight of pt.

## 2022-11-13 VITALS
HEIGHT: 70 IN | TEMPERATURE: 98.4 F | WEIGHT: 180.56 LBS | DIASTOLIC BLOOD PRESSURE: 103 MMHG | RESPIRATION RATE: 16 BRPM | HEART RATE: 67 BPM | SYSTOLIC BLOOD PRESSURE: 145 MMHG | OXYGEN SATURATION: 94 % | BODY MASS INDEX: 25.85 KG/M2

## 2022-11-13 LAB — GLUCOSE BLD STRIP.AUTO-MCNC: 111 MG/DL (ref 65–99)

## 2022-11-13 PROCEDURE — A9270 NON-COVERED ITEM OR SERVICE: HCPCS | Performed by: STUDENT IN AN ORGANIZED HEALTH CARE EDUCATION/TRAINING PROGRAM

## 2022-11-13 PROCEDURE — 82962 GLUCOSE BLOOD TEST: CPT

## 2022-11-13 PROCEDURE — 700102 HCHG RX REV CODE 250 W/ 637 OVERRIDE(OP): Performed by: STUDENT IN AN ORGANIZED HEALTH CARE EDUCATION/TRAINING PROGRAM

## 2022-11-13 RX ADMIN — OMEPRAZOLE 20 MG: 20 CAPSULE, DELAYED RELEASE ORAL at 06:00

## 2022-11-13 RX ADMIN — PAROXETINE HYDROCHLORIDE 10 MG: 10 TABLET, FILM COATED ORAL at 06:00

## 2022-11-13 RX ADMIN — LOSARTAN POTASSIUM 100 MG: 25 TABLET, FILM COATED ORAL at 06:00

## 2022-11-13 RX ADMIN — METFORMIN HYDROCHLORIDE 500 MG: 500 TABLET ORAL at 08:43

## 2022-11-13 NOTE — ED NOTES
Pt resting in bed. States unable to give urine sample at this time.     Pt with 1:1 close obs sitter outside room in direct sight of pt.  Rise and fall of chest noted.

## 2022-11-13 NOTE — ED NOTES
Marta from behavioral health is not required for psych evaluation at the time.   Pt will be accepted into [facility] at 0800 in the morning.    Sitter 1:1 outside room in direct sight of pt.

## 2022-11-13 NOTE — ED NOTES
Pt given meds per mar.   Pt sitting in bed comfortably.     Sitter outside room 1:1 in direct sight of pt.

## 2022-11-13 NOTE — ED NOTES
Patient's home medications have been reviewed by the pharmacy team.     Past Medical History:   Diagnosis Date    Anxiety     Bowel habit changes     10/25/18-resolved    Chickenpox as child    Closed head injury 2004    fell off ladder.    Cold 10/25/2018    States a little laryngitis and sore throat in past couple days. Denies fevers.     Depression     Dermatitis     to arms and face and pt pick at and scratches sites.    Diabetes (HCC)     orals meds. 10/25/18-pt does not check glucose.     Elevated alkaline phosphatase level 12/5/2018    Frequent headaches 1/26/2016    10/25/18-twice daily.    GERD (gastroesophageal reflux disease)     [place] measles as child    Heart burn     treated with omeprazole    Hiatus hernia syndrome     Hyperglycemia 1/26/2016    Hypertension     Incomplete tear of right rotator cuff 10/8/2018    Indigestion     treated with omeprazole.    Kidney stone     Major depressive disorder with single episode, in full remission (HCC) 11/22/2010    Psychiatric problem     states depression/anxiety    Right shoulder pain 10/25/2018    Rosacea 1/26/2016    Sleep apnea     cpap    Snoring        Patient's Medications   New Prescriptions    No medications on file   Previous Medications    ASA/APAP/CAFFEINE (EXCEDRIN) 250-250-65 MG TAB    Take 2 Tablets by mouth every 6 hours as needed for Headache.    ERENUMAB-AOOE (AIMOVIG) 140 MG/ML SOLUTION AUTO-INJECTOR    Inject 140 mg under the skin every 30 (thirty) days.    GLUCOSAMINE HCL PO    Take 2 Tablets by mouth every day.    HYDROXYZINE HCL (ATARAX) 25 MG TAB    Take 2 Tablets by mouth at bedtime. Indications: Feeling Anxious    LORATADINE (CLARITIN) 10 MG TAB    Take 1 Tablet by mouth every day.    LORAZEPAM (ATIVAN) 1 MG TAB    Take 1 Tablet by mouth every four hours as needed for Anxiety.    LOSARTAN (COZAAR) 100 MG TAB    Take 1 Tablet by mouth every day. FOR 30 DAYS    MAGNESIUM OXIDE (MAG-OX) 400 MG TAB    Take 1 Tablet by mouth every day.     MELOXICAM (MOBIC) 7.5 MG TAB    Take 1 Tablet by mouth every day.    METFORMIN (GLUCOPHAGE) 500 MG TAB    Take 1 Tab by mouth 2 times a day, with meals.    METHOCARBAMOL (ROBAXIN) 750 MG TAB    Take 1 Tablet by mouth every 8 hours.    OMEPRAZOLE (PRILOSEC) 20 MG DELAYED-RELEASE CAPSULE    Take 1 Capsule by mouth every morning.    PAROXETINE (PAXIL) 10 MG TAB    Take 1 Tablet by mouth every morning.    SILDENAFIL CITRATE (VIAGRA) 50 MG TABLET    Take 1 Tablet by mouth 1 time a day as needed for Erectile Dysfunction.    SIMVASTATIN (ZOCOR) 5 MG TAB    Take 1 Tablet by mouth every evening.    VILAZODONE HCL 10 MG TAB    Take 1 Tablet by mouth every day.   Modified Medications    No medications on file   Discontinued Medications    APPLE CIDER VINEGAR PO    Take 3 Capsules by mouth every evening. 3 capsules nightly    OMEPRAZOLE (PRILOSEC) 20 MG DELAYED-RELEASE CAPSULE    TAKE 1 CAPSULE DAILY    SILDENAFIL CITRATE (VIAGRA) 50 MG TABLET    TAKE ONE TABLET BY MOUTH DAILY AS NEEDED FOR ERECTILE DYSFUNCTION          A:  Medications do not appear to be contributing to current complaints.         P:    No recommendations at this time.   Home medications have been reordered per discussion with ER provider   Did not order meloxicam, methocarbamol, vilazodone as patient reported he stopped taking those medications  Did not order monthly amovig injection given non-formulary and last dose on 11/11/2022  Adjusted home ativan from every 4 hours as needed for anxiety to every 12 hours as needed for anxiety per discussion with provider        Yana Stephens, PharmD

## 2022-11-13 NOTE — DISCHARGE PLANNING
Alert Team:    Pt has been accepted by Dr. Vasquez at [facility]; requesting transport at 0800 [facility] staff. Updated ERP.

## 2022-11-13 NOTE — CONSULTS
Alert Team:    Legal hold has been certified by ERP. Psychiatric referrals have been sent to appropriate facilities. Psychiatry consult has been placed.

## 2022-11-13 NOTE — ED NOTES
Joaquín Fernández (brother) called per patient request to let him know where patient will be going and that he is okay.

## 2022-11-13 NOTE — ED NOTES
Pharmacy Medication Reconciliation      ~Medication reconciliation updated and complete per patient at bedside  ~Allergies have been verified   ~No oral ABX within the last 30 days  ~Patient home pharmacy :  [facility]    ~Patient reports he stopped Vilazodone on 11/7/2022    ~Patient reports he stopped taking his Meloxicam & Methocarbamol on Wednesday 11/9/2022

## 2022-11-13 NOTE — ED PROVIDER NOTES
ED observation note    ED Observation Note:    Admitted to ED observation at November 13 patient is an observation status.  The patient was being obvious that this is my before on 12 November.  He is seen now again 10:00 in the morning    Family history good history for mental health    ED Observation course   The patient is here for psych evaluation feeling depressed and depressive thoughts.  The patient at this point has increased Paxil but at this point he is feeling suicidal    The patient is it was examined again today at 10:00 he states that the Ativan helped him sleep better he is actually not suicidal at this time he states that he does need help and that he has had a breakdown in May and April has been seeing therapy changing his medications and was just overwhelmed yesterday.  He states that he would like some Ativan before he goes to his therapy session in the next week.    Repeat physical exam statement: Exam repeated he is well-nourished psychiatric no pressured speech no flight of ideas well-appearing good mentation    Discharged from ED observation at 10:00 in the morning today had discharged by EMS to [facility]    Reza has a diagnosis  1.  Suicidal ideation patient is improving at this point the patient really just wanted to get a slight short course of Ativan until he saw his therapist new but that the depression is actually getting worse over several months    Depression patient's depression at this point is chronic 6 months getting worse fluctuating to the point where he is having suicidal ideation    Plan  Patient is now being transferred to [facility] for further evaluation

## 2022-11-13 NOTE — ED NOTES
Safety Interventions Patient Room Close to Nursing Station; Patient Wearing Hospital Clothing; Personal Clothing / Belongings Removed (Comment: Storage Location); Potentially Dangerous Items Removed from room; Plastic Utensils / Paper Ware Ordered; Provided Safety Education; Discussed no self harm or elopement and safe behavior with patient; Patient Accompanied by Unit Staff when off Unit.; Notify Receiving Department of Close Observation Status; Medically necessary equipment present, hourly room safety check, and post-meal tray check.   Pt with 1:1 close obs sitter. Pt is cooperative and understands legal hold.  Pt belongings in a safe place.

## 2022-11-13 NOTE — ED NOTES
Sitter outside room 1:1 in direct sight of pt.   Rise and fall of pt chest noted.    Pt sleeping in bed.

## 2022-11-13 NOTE — ED NOTES
PT resting quietly, respirations even and unlabored, sitter at bedside to maintain SI precautions

## 2022-11-13 NOTE — DISCHARGE PLANNING
[facility] ED Behavioral Health Fax Referral      [facility] ED Behavioral Health Alert Team:  [phone]    Referral: Legal Hold    Intervention: Patient referral to [facility] inpatient  facillity    Legal Hold Initiated: Date: 11/12/2022 Time: 1524     Patient’s Insurance Listed on Face Sheet: [place] Medicaid    Referrals sent to: [facility], [facility], [facility]    Referrals faxed by: YESENIA Verma    This referral contains the following information:  Face sheet __x__  Page 1 and Page 2 of Legal Hold __x__  Alert Team Assessment/Psych Assessment ____  Head to toe physical exam __x__  Urine Drug Screen __x__  Blood Alcohol __x__  Vital signs __x__  Pregnancy test when applicable ___  Medications list __x__  Covid screening ____    Plan: Patient will transfer to mental health facility once acceptance is obtained

## 2022-11-13 NOTE — ED NOTES
Report to [facility]. Patient transfers by ambulance to [facility]. Report to [facility].

## 2022-11-13 NOTE — ED NOTES
Alert term has been notified.   Pending evaluation.  Unsure of ETA.    Continue close obs. Pt remains cooperative. With sitter 1:1 outside of pt room.

## 2023-04-24 NOTE — ED TRIAGE NOTES
"Chief Complaint   Patient presents with   • Anxiety     Pt walk-in by self. Pt states he took a road trip to [place] and last Friday at 03:00 in the hotel room he had severe anxiety and it has persisted since. Pt states Hx of anxiety/depression but weaned himself off the Rx in 2015. Pt went to urgent care in [place] on 5/2 and they Rx hydroxyzine, he has taken 5 of them so far but they are not helping.     /82   Pulse 86   Temp 37 °C (98.6 °F) (Temporal)   Resp (!) 88   Ht 1.791 m (5' 10.5\")   Wt 81.8 kg (180 lb 5.4 oz)   SpO2 93% RA    Has this patient been vaccinated for COVID?  YES  If not, would they like to be vaccinated while in the ER if eligible?  -  Would the patient like to speak with the ERP about the possibility of receiving the COVID vaccine today before making a decision?  -  " Detail Level: Detailed General Sunscreen Counseling: I recommended a broad spectrum sunscreen with a SPF of 50 or higher.  I explained that SPF 50 sunscreens block approximately 98 percent of the sun's harmful rays.  Sunscreens should be applied at least 15 minutes prior to expected sun exposure and then every 2 hours after that as long as sun exposure continues. If swimming or exercising sunscreen should be reapplied every 45 minutes to an hour after getting wet or sweating.  One ounce, or the equivalent of a shot glass full of sunscreen, is adequate to protect the skin not covered by a bathing suit. I also recommended a lip balm with a sunscreen as well. Sun protective clothing can be used in lieu of sunscreen but must be worn the entire time you are exposed to the sun's rays.

## 2023-10-27 NOTE — ASSESSMENT & PLAN NOTE
This is a chronic health problem that this patient had for approximately 8 years.  Providers in the past have told him if he did lose weight and could get his weight under 200 pounds more than likely would not have diabetes anymore.  He does not check his blood sugars on a regular basis and he has not had an A1c since May.  We will do a point-of-care test today.  We will then determine what other tests need to be done.  Patient's A1c done here in the office came back at 6.1 showing excellent control of his blood sugars.  He is less certain about the control of his lipids.   Xray Elbow AP + Lateral + Oblique, Right

## 2024-04-27 ENCOUNTER — HOSPITAL ENCOUNTER (EMERGENCY)
Facility: MEDICAL CENTER | Age: 64
End: 2024-04-27
Attending: EMERGENCY MEDICINE
Payer: COMMERCIAL

## 2024-04-27 VITALS
TEMPERATURE: 97.9 F | HEART RATE: 63 BPM | RESPIRATION RATE: 18 BRPM | SYSTOLIC BLOOD PRESSURE: 135 MMHG | HEIGHT: 71 IN | DIASTOLIC BLOOD PRESSURE: 86 MMHG | OXYGEN SATURATION: 97 % | BODY MASS INDEX: 25.71 KG/M2 | WEIGHT: 183.64 LBS

## 2024-04-27 DIAGNOSIS — R33.9 URINARY RETENTION: ICD-10-CM

## 2024-04-27 DIAGNOSIS — R31.0 GROSS HEMATURIA: ICD-10-CM

## 2024-04-27 LAB
APPEARANCE UR: ABNORMAL
BILIRUB UR QL STRIP.AUTO: ABNORMAL
COLOR UR: ABNORMAL
GLUCOSE UR STRIP.AUTO-MCNC: NEGATIVE MG/DL
KETONES UR STRIP.AUTO-MCNC: NEGATIVE MG/DL
LEUKOCYTE ESTERASE UR QL STRIP.AUTO: ABNORMAL
MICRO URNS: ABNORMAL
MUCOUS THREADS #/AREA URNS HPF: ABNORMAL /HPF
NITRITE UR QL STRIP.AUTO: NEGATIVE
PH UR STRIP.AUTO: 6.5 [PH] (ref 5–8)
PROT UR QL STRIP: 100 MG/DL
RBC # URNS HPF: ABNORMAL /HPF
RBC UR QL AUTO: ABNORMAL
SP GR UR STRIP.AUTO: 1.02
WBC #/AREA URNS HPF: ABNORMAL /HPF

## 2024-04-27 PROCEDURE — 700101 HCHG RX REV CODE 250: Performed by: EMERGENCY MEDICINE

## 2024-04-27 PROCEDURE — 303105 HCHG CATHETER EXTRA

## 2024-04-27 PROCEDURE — 81001 URINALYSIS AUTO W/SCOPE: CPT

## 2024-04-27 PROCEDURE — 51798 US URINE CAPACITY MEASURE: CPT

## 2024-04-27 PROCEDURE — 51702 INSERT TEMP BLADDER CATH: CPT

## 2024-04-27 PROCEDURE — 99284 EMERGENCY DEPT VISIT MOD MDM: CPT

## 2024-04-27 RX ORDER — LIDOCAINE HYDROCHLORIDE 20 MG/ML
JELLY TOPICAL ONCE
Status: COMPLETED | OUTPATIENT
Start: 2024-04-27 | End: 2024-04-27

## 2024-04-27 RX ADMIN — LIDOCAINE HYDROCHLORIDE 2 %: 20 JELLY TOPICAL at 02:30

## 2024-04-27 ASSESSMENT — FIBROSIS 4 INDEX: FIB4 SCORE: 1.83

## 2024-04-27 NOTE — ED TRIAGE NOTES
"64 yr old male to triage  Chief Complaint   Patient presents with    Unable to Urinate     Patient report of difficulty urinating and small amounts of urine noted that is bloody with small clots since last night around 10 pm.  Patient had a TURP on 04/17/2024.      Blood in Urine     /88   Pulse 78   Temp 36.2 °C (97.2 °F) (Temporal)   Resp 18   Ht 1.803 m (5' 11\")   Wt 83.3 kg (183 lb 10.3 oz)   SpO2 96%   BMI 25.61 kg/m²     "

## 2024-04-27 NOTE — ED PROVIDER NOTES
ED Provider Note    CHIEF COMPLAINT  Chief Complaint   Patient presents with    Unable to Urinate     Patient report of difficulty urinating and small amounts of urine noted that is bloody with small clots since last night around 10 pm.  Patient had a TURP on 04/17/2024.      Blood in Urine       EXTERNAL RECORDS REVIEWED  Inpatient Notes reviewed discharge summary by Dr. Mena dated October 6, 2022.  Patient admitted October 5 complaining of dysuria.  8 mm distal stone noted to the left ureter on CT imaging of abdomen pelvis on October 5.  Underwent lithotripsy with retrograde pyelogram.  Pain relieved, cleared by urology, able to be discharged October 6.    HPI/ROS  LIMITATION TO HISTORY   Select: : None  OUTSIDE HISTORIAN(S):  None available    Miguel Angel Fernández is a 64 y.o. male who presents for evaluation of urinary retention.  Patient relates he had similar symptoms on Tuesday that had self resolved.  He relates approximately 10 days ago he underwent TURP with urology [place].  He was seen in the clinic on Tuesday for urinary retention and hematuria and was found to have a urinary tract infection and started on antibiotics.  He relates his urinary retention self resolved without any need for catheterization.  He relates he has finished his antibiotics yesterday.  He notes over starting last night at 10 PM he again noted blood when he went to urinate, he notes with significant straining he is able to pass only a small volume of urine including small blood clots.  He does take Excedrin for headaches but is not anticoagulated.  No flank pain, no fever, no vomiting.  Given persistent discomfort and urinary urgency without ability to urinate significant volume he came to be assessed.    PAST MEDICAL HISTORY   has a past medical history of Anxiety, Bowel habit changes, Chickenpox (as child), Closed head injury (2004), Cold (10/25/2018), Depression, Dermatitis, Diabetes (HCC), Elevated alkaline phosphatase level  (2018), Frequent headaches (2016), GERD (gastroesophageal reflux disease), [place] measles (as child), Heart burn, Hiatus hernia syndrome, Hyperglycemia (2016), Hypertension, Incomplete tear of right rotator cuff (10/8/2018), Indigestion, Kidney stone, Major depressive disorder with single episode, in full remission (HCC) (2010), Psychiatric problem, Right shoulder pain (10/25/2018), Rosacea (2016), Sleep apnea, and Snoring.    SURGICAL HISTORY   has a past surgical history that includes orif, elbow (Left, 10/2004); repair, knee, acl (Left, 2002); septoturbinoplasty (2010); ventral hernia repair (2016); meniscectomy (Right, ); hip arthroplasty total (Left, 2016); eye surgery (Right, ); testicle exploration (); cystoscopy (2010); dental extraction(s) (); colonoscopy (2018); shoulder decompression arthroscopic (Right, 2018); clavicle distal excision (Right, 2018); orchiectomy (Left, 2022); cystoscopy,insert ureteral stent (Left, 10/5/2022); and lasertripsy (Left, 10/5/2022).    FAMILY HISTORY  Family History   Problem Relation Age of Onset    Diabetes Father     Heart Attack Father 60    Cancer Neg Hx     Suicide Attempts Neg Hx        SOCIAL HISTORY  Social History     Tobacco Use    Smoking status: Former     Current packs/day: 0.00     Average packs/day: 1 pack/day for 5.0 years (5.0 ttl pk-yrs)     Types: Cigarettes     Start date: 1977     Quit date: 1982     Years since quittin.3    Smokeless tobacco: Never    Tobacco comments:     quit 35 years ago   Vaping Use    Vaping Use: Never used   Substance and Sexual Activity    Alcohol use: Not Currently     Alcohol/week: 0.0 oz     Comment: rare     Drug use: Not Currently    Sexual activity: Not Currently       CURRENT MEDICATIONS  Home Medications       Reviewed by Janell Newman R.N. (Registered Nurse) on 24 at 0152  Med List Status: Partial     Medication Last  "Dose Status   asa/apap/caffeine (EXCEDRIN) 250-250-65 MG Tab 4/26/2024 Active   Erenumab-aooe (AIMOVIG) 140 MG/ML Solution Auto-injector 4/15/2024 Active   hydrOXYzine HCl (ATARAX) 25 MG Tab 4/26/2024 Active   losartan (COZAAR) 100 MG Tab 4/26/2024 Active   magnesium oxide (MAG-OX) 400 MG Tab 4/26/2024 Active   metFORMIN (GLUCOPHAGE) 500 MG Tab 4/26/2024 Active   omeprazole (PRILOSEC) 20 MG delayed-release capsule 4/26/2024 Active   PARoxetine (PAXIL) 10 MG Tab 4/26/2024 Active   sildenafil citrate (VIAGRA) 50 MG tablet as needed Active   simvastatin (ZOCOR) 5 MG Tab 4/26/2024 Active                    ALLERGIES  No Known Allergies    PHYSICAL EXAM  VITAL SIGNS: /88   Pulse 78   Temp 36.2 °C (97.2 °F) (Temporal)   Resp 18   Ht 1.803 m (5' 11\")   Wt 83.3 kg (183 lb 10.3 oz)   SpO2 96%   BMI 25.61 kg/m²    General: Alert, mild acute distress, appears uncomfortable  Skin: Warm, dry, normal for ethnicity  Head: Normocephalic, atraumatic  Neck: Trachea midline  Eye: PERRL, normal conjunctiva, sclera anicteric  ENMT: Oral mucosa   Cardiovascular: Regular rate and rhythm, Normal peripheral perfusion  Respiratory:  respirations are non-labored, breath sounds are equal  Gastrointestinal: Soft, no CVA tenderness, tenderness to suprapubic region of the abdomen, no guarding, no rebound, no rigidity.  Musculoskeletal: No swelling, no deformity  Neurological: Alert and oriented to person, place, time, and situation  Lymphatics: No lymphadenopathy  Psychiatric: Cooperative, appropriate mood & affect     EKG/LABS  Results for orders placed or performed during the hospital encounter of 04/27/24   URINALYSIS    Specimen: Urine   Result Value Ref Range    Color Brown (A)     Character Bloody (A)     Specific Gravity 1.025 <1.035    Ph 6.5 5.0 - 8.0    Glucose Negative Negative mg/dL    Ketones Negative Negative mg/dL    Protein 100 (A) Negative mg/dL    Bilirubin Small (A) Negative    Nitrite Negative Negative    " Leukocyte Esterase Trace (A) Negative    Occult Blood Large (A) Negative    Micro Urine Req Microscopic    URINE MICROSCOPIC (W/UA)   Result Value Ref Range    WBC 0-2 (A) /hpf    -150 (A) /hpf    Mucous Threads Few /hpf      I have independently interpreted this EKG          COURSE & MEDICAL DECISION MAKING    ASSESSMENT, COURSE AND PLAN  Care Narrative: Afebrile nontoxic 64-year-old gentleman presents for evaluation of urinary retention.  He is approximately 10 days status post TURP with urology [place].  Not anticoagulated but does take NSAIDs for headaches.  Reassuringly he is afebrile, nontoxic, has no tachycardia, no CVA tenderness.  Pain totally resolved with Huang catheterization and bladder decompression.  No indication as such for inpatient management, there is no evidence of sepsis nor pyelonephritis.  Catheter is draining normally.  Amenable to follow-up with established urology in the outpatient setting.    ED OBS: Yes; I am placing the patient in to an observation status due to a diagnostic uncertainty as well as therapeutic intensity. Patient placed in observation status at 1:42 AM, 4/27/2024.     Observation plan is as follows: Bladder scanner demonstrates nearly 400 cc of retained urine.  As such we will proceed with Huang catheterization.    0301: Patient reassessed, feeling much better.  Repeat exam demonstrates no tenderness over the suprapubic region of the abdomen.  Huang catheter draining well, urine is indeed dark and blood-tinged but I do not see a large clot burden.  He is amenable to leg bag and follow-up with urology with whom he is already established.    Upon Reevaluation, the patient's condition has: Improved; and will be discharged.    Patient discharged from ED Observation status at 0304 (Time) 4/27/24 (Date).           ADDITIONAL PROBLEMS MANAGED  Hematuria after TURP, acute urinary retention requiring Huang catheterization    DISPOSITION AND DISCUSSIONS  I have discussed  management of the patient with the following physicians and [place]'s:  NA    Discussion of management with other [facility] or appropriate source(s): None     Escalation of care considered, and ultimately not performed:acute inpatient care management, however at this time, the patient is most appropriate for outpatient management    Barriers to care at this time, including but not limited to:  NA .     Decision tools and prescription drugs considered including, but not limited to:  NA .    The patient will return for new or worsening symptoms and is stable at the time of discharge.    Patient has had high blood pressure while in the emergency department, felt likely secondary to medical condition. Counseled patient to monitor blood pressure at home and follow up with primary care physician.      DISPOSITION:  Patient will be discharged home in stable condition.    FOLLOW UP:  Your urologist    Schedule an appointment as soon as possible for a visit         OUTPATIENT MEDICATIONS:  New Prescriptions    No medications on file          FINAL DIAGNOSIS  1. Urinary retention    2. Gross hematuria           Electronically signed by: Johnathon Vega M.D., 4/27/2024 1:41 AM

## 2024-04-27 NOTE — ED NOTES
Pt switched to leg bag. Pt instructed on how to connect and disconnect his catheter bag. Pt is being sent home with extra leg bag, piston syringe and sterile water in case catheter becomes clogged due to blood clots.

## 2024-05-03 ENCOUNTER — APPOINTMENT (OUTPATIENT)
Dept: RADIOLOGY | Facility: MEDICAL CENTER | Age: 64
End: 2024-05-03
Attending: EMERGENCY MEDICINE
Payer: COMMERCIAL

## 2024-05-03 ENCOUNTER — HOSPITAL ENCOUNTER (EMERGENCY)
Facility: MEDICAL CENTER | Age: 64
End: 2024-05-04
Attending: EMERGENCY MEDICINE
Payer: COMMERCIAL

## 2024-05-03 DIAGNOSIS — R33.9 URINARY RETENTION: ICD-10-CM

## 2024-05-03 DIAGNOSIS — R31.0 GROSS HEMATURIA: ICD-10-CM

## 2024-05-03 DIAGNOSIS — N13.9 URINARY OUTFLOW OBSTRUCTION: ICD-10-CM

## 2024-05-03 LAB
ALBUMIN SERPL BCP-MCNC: 4 G/DL (ref 3.2–4.9)
ALBUMIN/GLOB SERPL: 1.5 G/DL
ALP SERPL-CCNC: 102 U/L (ref 30–99)
ALT SERPL-CCNC: 16 U/L (ref 2–50)
ANION GAP SERPL CALC-SCNC: 13 MMOL/L (ref 7–16)
APPEARANCE UR: ABNORMAL
AST SERPL-CCNC: 18 U/L (ref 12–45)
BASOPHILS # BLD AUTO: 1.3 % (ref 0–1.8)
BASOPHILS # BLD: 0.09 K/UL (ref 0–0.12)
BILIRUB SERPL-MCNC: 0.2 MG/DL (ref 0.1–1.5)
BILIRUB UR QL STRIP.AUTO: NEGATIVE
BUN SERPL-MCNC: 21 MG/DL (ref 8–22)
CALCIUM ALBUM COR SERPL-MCNC: 9.2 MG/DL (ref 8.5–10.5)
CALCIUM SERPL-MCNC: 9.2 MG/DL (ref 8.4–10.2)
CHLORIDE SERPL-SCNC: 105 MMOL/L (ref 96–112)
CO2 SERPL-SCNC: 23 MMOL/L (ref 20–33)
COLOR UR: ABNORMAL
CREAT SERPL-MCNC: 1.12 MG/DL (ref 0.5–1.4)
EOSINOPHIL # BLD AUTO: 0.38 K/UL (ref 0–0.51)
EOSINOPHIL NFR BLD: 5.5 % (ref 0–6.9)
ERYTHROCYTE [DISTWIDTH] IN BLOOD BY AUTOMATED COUNT: 40.4 FL (ref 35.9–50)
GFR SERPLBLD CREATININE-BSD FMLA CKD-EPI: 72 ML/MIN/1.73 M 2
GLOBULIN SER CALC-MCNC: 2.6 G/DL (ref 1.9–3.5)
GLUCOSE SERPL-MCNC: 146 MG/DL (ref 65–99)
GLUCOSE UR STRIP.AUTO-MCNC: NEGATIVE MG/DL
HCT VFR BLD AUTO: 42.2 % (ref 42–52)
HGB BLD-MCNC: 14.1 G/DL (ref 14–18)
IMM GRANULOCYTES # BLD AUTO: 0.01 K/UL (ref 0–0.11)
IMM GRANULOCYTES NFR BLD AUTO: 0.1 % (ref 0–0.9)
KETONES UR STRIP.AUTO-MCNC: NEGATIVE MG/DL
LEUKOCYTE ESTERASE UR QL STRIP.AUTO: NEGATIVE
LYMPHOCYTES # BLD AUTO: 2.4 K/UL (ref 1–4.8)
LYMPHOCYTES NFR BLD: 34.9 % (ref 22–41)
MCH RBC QN AUTO: 30.5 PG (ref 27–33)
MCHC RBC AUTO-ENTMCNC: 33.4 G/DL (ref 32.3–36.5)
MCV RBC AUTO: 91.1 FL (ref 81.4–97.8)
MICRO URNS: ABNORMAL
MONOCYTES # BLD AUTO: 0.72 K/UL (ref 0–0.85)
MONOCYTES NFR BLD AUTO: 10.5 % (ref 0–13.4)
NEUTROPHILS # BLD AUTO: 3.27 K/UL (ref 1.82–7.42)
NEUTROPHILS NFR BLD: 47.7 % (ref 44–72)
NITRITE UR QL STRIP.AUTO: NEGATIVE
NRBC # BLD AUTO: 0 K/UL
NRBC BLD-RTO: 0 /100 WBC (ref 0–0.2)
PH UR STRIP.AUTO: 6.5 [PH] (ref 5–8)
PLATELET # BLD AUTO: 176 K/UL (ref 164–446)
PMV BLD AUTO: 10.9 FL (ref 9–12.9)
POTASSIUM SERPL-SCNC: 3.5 MMOL/L (ref 3.6–5.5)
PROT SERPL-MCNC: 6.6 G/DL (ref 6–8.2)
PROT UR QL STRIP: 100 MG/DL
RBC # BLD AUTO: 4.63 M/UL (ref 4.7–6.1)
RBC # URNS HPF: >150 /HPF
RBC UR QL AUTO: ABNORMAL
SODIUM SERPL-SCNC: 141 MMOL/L (ref 135–145)
SP GR UR STRIP.AUTO: 1.02
WBC # BLD AUTO: 6.9 K/UL (ref 4.8–10.8)
WBC #/AREA URNS HPF: ABNORMAL /HPF

## 2024-05-03 PROCEDURE — 700101 HCHG RX REV CODE 250: Performed by: EMERGENCY MEDICINE

## 2024-05-03 PROCEDURE — 85025 COMPLETE CBC W/AUTO DIFF WBC: CPT

## 2024-05-03 PROCEDURE — 99284 EMERGENCY DEPT VISIT MOD MDM: CPT

## 2024-05-03 PROCEDURE — 51702 INSERT TEMP BLADDER CATH: CPT

## 2024-05-03 PROCEDURE — 74176 CT ABD & PELVIS W/O CONTRAST: CPT

## 2024-05-03 PROCEDURE — 81001 URINALYSIS AUTO W/SCOPE: CPT

## 2024-05-03 PROCEDURE — 303105 HCHG CATHETER EXTRA

## 2024-05-03 PROCEDURE — 80053 COMPREHEN METABOLIC PANEL: CPT

## 2024-05-03 PROCEDURE — 51700 IRRIGATION OF BLADDER: CPT

## 2024-05-03 PROCEDURE — 51798 US URINE CAPACITY MEASURE: CPT

## 2024-05-03 RX ORDER — LIDOCAINE HYDROCHLORIDE 20 MG/ML
JELLY TOPICAL ONCE
Status: COMPLETED | OUTPATIENT
Start: 2024-05-03 | End: 2024-05-03

## 2024-05-03 RX ADMIN — LIDOCAINE HYDROCHLORIDE 2 %: 20 JELLY TOPICAL at 23:30

## 2024-05-03 ASSESSMENT — PAIN DESCRIPTION - PAIN TYPE: TYPE: ACUTE PAIN

## 2024-05-03 ASSESSMENT — FIBROSIS 4 INDEX: FIB4 SCORE: 1.83

## 2024-05-04 VITALS
HEART RATE: 64 BPM | DIASTOLIC BLOOD PRESSURE: 72 MMHG | RESPIRATION RATE: 21 BRPM | TEMPERATURE: 98.4 F | HEIGHT: 71 IN | SYSTOLIC BLOOD PRESSURE: 141 MMHG | BODY MASS INDEX: 25.2 KG/M2 | OXYGEN SATURATION: 96 % | WEIGHT: 180 LBS

## 2024-05-04 PROCEDURE — 700101 HCHG RX REV CODE 250: Performed by: EMERGENCY MEDICINE

## 2024-05-04 RX ORDER — LIDOCAINE HYDROCHLORIDE 20 MG/ML
JELLY TOPICAL ONCE
Status: COMPLETED | OUTPATIENT
Start: 2024-05-04 | End: 2024-05-04

## 2024-05-04 RX ADMIN — LIDOCAINE HYDROCHLORIDE 2 %: 20 JELLY TOPICAL at 00:45

## 2024-05-04 NOTE — ED PROVIDER NOTES
ED Provider Note    CHIEF COMPLAINT  Chief Complaint   Patient presents with    Urinary Retention     EXTERNAL RECORDS REVIEWED  Inpatient Notes reviewed discharge summary by Dr. Mena dated October 6, 2022.  Patient admitted October 5 complaining of dysuria.  8 mm distal stone noted to the left ureter on CT imaging of abdomen pelvis on October 5.  Underwent lithotripsy with retrograde pyelogram.  Pain relieved, cleared by urology, able to be discharged October 6.     From 4/27, post TURP pain from procedure on 4/17/2024 with dysuria and passage of clots.  At that visit the patient had no CVA tenderness, RBCs present on UA with no signs of concurrent infection, catheter at that time was draining normally.    HPI/ROS  LIMITATION TO HISTORY   Select: : None  OUTSIDE HISTORIAN(S):  none    Miguel Angel Fernández is a 64 y.o. male who presents emergency room for continued lower abdominal discomfort, since sensations similar to when he had a outflow tract obstruction and blood clots in his bladder.  Patient had a Huang catheter removed last Tuesday following TURP procedure and encounter in the ER for urinary retention for ongoing bleeding.  Since that time he was actually doing well for 3 to 4 days however tonight he felt the pressure coming on, felt like he had to squeeze his bladder in order to get anything out and eventually when he did he was just getting continuation of clots.  He has had some left-sided flank discomfort, has not had any fevers, chills and says he is not actively having any pain at the time of evaluation.  He had a history of large kidney stones requiring surgical intervention.  Reports his last urination was completely blood.  He is denying shortness of breath, no dizziness, no lightheadedness, no chest discomfort.  No gross abdominal distention or other areas of localizing tenderness.  He is not on blood thinners.    PAST MEDICAL HISTORY   has a past medical history of Anxiety, Bowel habit changes,  Chickenpox (as child), Closed head injury (), Cold (10/25/2018), Depression, Dermatitis, Diabetes (HCC), Elevated alkaline phosphatase level (2018), Frequent headaches (2016), GERD (gastroesophageal reflux disease), [place] measles (as child), Heart burn, Hiatus hernia syndrome, Hyperglycemia (2016), Hypertension, Incomplete tear of right rotator cuff (10/8/2018), Indigestion, Kidney stone, Major depressive disorder with single episode, in full remission (HCC) (2010), Psychiatric problem, Right shoulder pain (10/25/2018), Rosacea (2016), Sleep apnea, and Snoring.    SURGICAL HISTORY   has a past surgical history that includes orif, elbow (Left, 10/2004); repair, knee, acl (Left, 2002); septoturbinoplasty (2010); ventral hernia repair (2016); meniscectomy (Right, ); hip arthroplasty total (Left, 2016); eye surgery (Right, ); testicle exploration (); cystoscopy (2010); dental extraction(s) (); colonoscopy (2018); shoulder decompression arthroscopic (Right, 2018); clavicle distal excision (Right, 2018); orchiectomy (Left, 2022); cystoscopy,insert ureteral stent (Left, 10/5/2022); and lasertripsy (Left, 10/5/2022).    FAMILY HISTORY  Family History   Problem Relation Age of Onset    Diabetes Father     Heart Attack Father 60    Cancer Neg Hx     Suicide Attempts Neg Hx        SOCIAL HISTORY  Social History     Tobacco Use    Smoking status: Former     Current packs/day: 0.00     Average packs/day: 1 pack/day for 5.0 years (5.0 ttl pk-yrs)     Types: Cigarettes     Start date: 1977     Quit date: 1982     Years since quittin.3    Smokeless tobacco: Never    Tobacco comments:     quit 35 years ago   Vaping Use    Vaping Use: Never used   Substance and Sexual Activity    Alcohol use: Not Currently     Alcohol/week: 0.0 oz     Comment: rare     Drug use: Not Currently    Sexual activity: Not Currently       CURRENT  "MEDICATIONS  Home Medications       Reviewed by Trinity Robert R.N. (Registered Nurse) on 05/03/24 at 2223  Med List Status: Not Addressed     Medication Last Dose Status   asa/apap/caffeine (EXCEDRIN) 250-250-65 MG Tab  Active   Erenumab-aooe (AIMOVIG) 140 MG/ML Solution Auto-injector  Active   hydrOXYzine HCl (ATARAX) 25 MG Tab  Active   losartan (COZAAR) 100 MG Tab  Active   magnesium oxide (MAG-OX) 400 MG Tab  Active   metFORMIN (GLUCOPHAGE) 500 MG Tab  Active   omeprazole (PRILOSEC) 20 MG delayed-release capsule  Active   PARoxetine (PAXIL) 10 MG Tab  Active   sildenafil citrate (VIAGRA) 50 MG tablet  Active   simvastatin (ZOCOR) 5 MG Tab  Active                    ALLERGIES  No Known Allergies    PHYSICAL EXAM  VITAL SIGNS: BP (!) 141/72   Pulse 64   Temp 36.9 °C (98.4 °F) (Temporal)   Resp (!) 21   Ht 1.803 m (5' 11\")   Wt 81.6 kg (180 lb)   SpO2 96%   BMI 25.10 kg/m²    Genl: M sitting in chair comfortably, speaking clearly, appears in very mild distress   Head: NC/AT   ENT: Mucous membranes moist, posterior pharynx clear, uvula midline, nares patent bilaterally   Pulmonary: Lungs are clear to auscultation bilaterally  Chest: No TTP  CV:  RRR, no murmur appreciated  Abdomen: soft, slight discomfort with deep palpation in the suprapubic region, no rebound or guarding.  No fluid wave or evidence of ascites.  No flank ecchymosis.  No localizing tenderness or pain with bilateral straight leg raise.  No CVA tenderness.  Musculoskeletal: Pain free ROM of the neck. Moving upper and lower extremities in spontaneous and coordinated fashion  Neuro: A&Ox4 (person, place, time, situation), speech fluent, gait steady, no focal deficits appreciated,  Skin: No rash or lesions.  No pallor or jaundice.  No cyanosis.  Warm and dry.     EKG/LABS  Labs Reviewed   URINALYSIS,CULTURE IF INDICATED - Abnormal; Notable for the following components:       Result Value    Color Red (*)     Character Bloody (*)     Protein 100 " (*)     Occult Blood Moderate (*)     All other components within normal limits   CBC WITH DIFFERENTIAL - Abnormal; Notable for the following components:    RBC 4.63 (*)     All other components within normal limits   COMP METABOLIC PANEL - Abnormal; Notable for the following components:    Potassium 3.5 (*)     Glucose 146 (*)     Alkaline Phosphatase 102 (*)     All other components within normal limits   URINE MICROSCOPIC (W/UA) - Abnormal; Notable for the following components:    WBC 0-2 (*)     RBC >150 (*)     All other components within normal limits   ESTIMATED GFR     RADIOLOGY/PROCEDURES   Point of Care Ultrasound    ED POINT OF CARE ULTRASOUND: LIMITED TRANSABDOMINAL  Indication for Exam: UrinaryRetention  Bladder is identified, there is what appears to be clot at the outlet causing obstruction.  Right kidney visualized: yes, no evidence of hydro  Left kidney visualized: yes, no evidence of hydro  Image retained through [facility] as seen below:     Splenorenal    hepatorenal    Bladder long axis    Bladder short axis    This study is a limited ultrasound examination performed and interpreted to evaluate for limited conditions as outlined above. There may be other clinically important information contained in the images that is outside this scope. When clinically warranted, a comprehensive ultrasound through the appropriate department is considered.    COURSE & MEDICAL DECISION MAKING    ASSESSMENT, COURSE AND PLAN  Care Narrative: Presents emergency room for symptoms as described above.  Patient is alert, oriented and has no fevers, tachycardia or localizing abdominal pain that would point towards a source of peritonitis.  He has had a TURP procedure and interventions including lithotripsy for large Kidney stones.  Following up with his urologist and having his postoperative Huang catheter removed he is continuing to have some intermittent pain and discomfort with blood collecting in the bladder based on the  ultrasound performed at the bedside and was holding onto greater than 300 mL with difficulty voiding.  Concern would be for a new ongoing kidney stone causing some continued bleeding down through the tubular system versus the possibility of masses or other sources.  It is reassuring to not see any febrile illness or signs of acute infectious etiology at this to be less likely this far out from his surgical procedure.    Blood work came back showing no leukocytosis, no concerning anemia.  Urinalysis shows signs of concurrent infection.  Negative nitrites and leukocyte esterase..  There is no COSTA, no gross electrolyte abnormalities.    Following the placement of a Huang catheter by nursing staff, this was flushed and return was eventually bloody with clots and eventually became pink.  Further this was clear and the patient was very adamant about not wanting to have a Huang catheter.  He is willing to wait till CT renal colic study shows no concurrent new stones or other findings.    CT related colic study shows no evidence of concerning ureteral or kidney findings, there is material in the bladder consistent with residual blood from prior TURP procedure..    At this time the patient's likely diagnosis is ongoing bleeding following his TURP procedure from 2 weeks ago.  Patient is hemodynamically stable, does not have other signs of bleeding from above the level of the bladder.  Patient is adamant that he would like to avoid having a urinary catheter at this time and will be given p.o. challenge and catheters removed and want to see if he is able to void on his own.  He understands that catheter placement can provide some tamponading of the friable tissues and around the prostate and he is already well-established with urology [place] and feels comfortable that he be able to call and get a follow-up appointment.    Subsequent reevaluation shows a patient who is resting comfortably in the bed, following removal of the  Huang he had difficulty urinating and became bloody again.  Huang was replaced and provided 7 tamponading and he was able to pass clear urine.  At this time it does appear he would benefit from placement of the Huang and subsequent outpatient follow-up    none    DISPOSITION AND DISCUSSIONS  I have discussed management of the patient with the following physicians and [place]'s:  none    Discussion of management with other [facility] or appropriate source(s): None     Escalation of care considered, and ultimately not performed:IV fluids    Barriers to care at this time, including but not limited to: none.     Decision tools and prescription drugs considered including, but not limited to:  none .    FINAL DIAGNOSIS  1. Urinary retention    2. Gross hematuria    3. Urinary outflow obstruction      Electronically signed by: Travis Chaney M.D., 5/3/2024 10:31 PM

## 2024-05-04 NOTE — ED TRIAGE NOTES
PT is conscious, alert and oriented. Pt has a patent airway and no signs of resp. Distress. Pt states that he has been having difficulty urinating. He had a catheter removed 4 days prior with no issue and had turp surgery on April 17th, Pt states there is blood in his urine.

## 2024-05-04 NOTE — ED NOTES
Pt. Verbalizes understanding of discharge instructions. accompanied to lobby with friend. Pt. Alert/awake in NAD.  Advised to ff-up with urologist.

## 2024-05-13 ENCOUNTER — ANESTHESIA (OUTPATIENT)
Dept: SURGERY | Facility: MEDICAL CENTER | Age: 64
End: 2024-05-13
Payer: COMMERCIAL

## 2024-05-13 ENCOUNTER — HOSPITAL ENCOUNTER (OUTPATIENT)
Facility: MEDICAL CENTER | Age: 64
End: 2024-05-14
Attending: STUDENT IN AN ORGANIZED HEALTH CARE EDUCATION/TRAINING PROGRAM | Admitting: INTERNAL MEDICINE
Payer: COMMERCIAL

## 2024-05-13 ENCOUNTER — ANESTHESIA EVENT (OUTPATIENT)
Dept: SURGERY | Facility: MEDICAL CENTER | Age: 64
End: 2024-05-13
Payer: COMMERCIAL

## 2024-05-13 DIAGNOSIS — R31.9 HEMATURIA, UNSPECIFIED TYPE: ICD-10-CM

## 2024-05-13 DIAGNOSIS — R33.8 ACUTE URINARY RETENTION: ICD-10-CM

## 2024-05-13 DIAGNOSIS — R31.0 GROSS HEMATURIA: ICD-10-CM

## 2024-05-13 PROBLEM — Z71.89 ADVANCE CARE PLANNING: Status: ACTIVE | Noted: 2024-05-13

## 2024-05-13 PROBLEM — R33.9 URINARY RETENTION: Status: ACTIVE | Noted: 2024-05-13

## 2024-05-13 PROBLEM — E87.20 METABOLIC ACIDOSIS: Status: ACTIVE | Noted: 2024-05-13

## 2024-05-13 LAB
ALBUMIN SERPL BCP-MCNC: 4.2 G/DL (ref 3.2–4.9)
ALBUMIN/GLOB SERPL: 1.3 G/DL
ALP SERPL-CCNC: 93 U/L (ref 30–99)
ALT SERPL-CCNC: 13 U/L (ref 2–50)
ANION GAP SERPL CALC-SCNC: 15 MMOL/L (ref 7–16)
APPEARANCE UR: ABNORMAL
AST SERPL-CCNC: 17 U/L (ref 12–45)
BACTERIA #/AREA URNS HPF: ABNORMAL /HPF
BASOPHILS # BLD AUTO: 0.8 % (ref 0–1.8)
BASOPHILS # BLD: 0.06 K/UL (ref 0–0.12)
BILIRUB SERPL-MCNC: 0.6 MG/DL (ref 0.1–1.5)
BILIRUB UR QL STRIP.AUTO: NEGATIVE
BUN SERPL-MCNC: 20 MG/DL (ref 8–22)
CALCIUM ALBUM COR SERPL-MCNC: 9.4 MG/DL (ref 8.5–10.5)
CALCIUM SERPL-MCNC: 9.6 MG/DL (ref 8.4–10.2)
CHLORIDE SERPL-SCNC: 106 MMOL/L (ref 96–112)
CO2 SERPL-SCNC: 18 MMOL/L (ref 20–33)
COLOR UR: ABNORMAL
CREAT SERPL-MCNC: 1.01 MG/DL (ref 0.5–1.4)
EOSINOPHIL # BLD AUTO: 0.12 K/UL (ref 0–0.51)
EOSINOPHIL NFR BLD: 1.6 % (ref 0–6.9)
EPI CELLS #/AREA URNS HPF: ABNORMAL /HPF
ERYTHROCYTE [DISTWIDTH] IN BLOOD BY AUTOMATED COUNT: 39.8 FL (ref 35.9–50)
GFR SERPLBLD CREATININE-BSD FMLA CKD-EPI: 82 ML/MIN/1.73 M 2
GLOBULIN SER CALC-MCNC: 3.2 G/DL (ref 1.9–3.5)
GLUCOSE BLD STRIP.AUTO-MCNC: 109 MG/DL (ref 65–99)
GLUCOSE BLD STRIP.AUTO-MCNC: 111 MG/DL (ref 65–99)
GLUCOSE BLD STRIP.AUTO-MCNC: 243 MG/DL (ref 65–99)
GLUCOSE BLD STRIP.AUTO-MCNC: 98 MG/DL (ref 65–99)
GLUCOSE SERPL-MCNC: 118 MG/DL (ref 65–99)
GLUCOSE UR STRIP.AUTO-MCNC: NEGATIVE MG/DL
HCT VFR BLD AUTO: 42.6 % (ref 42–52)
HGB BLD-MCNC: 14.4 G/DL (ref 14–18)
IMM GRANULOCYTES # BLD AUTO: 0.03 K/UL (ref 0–0.11)
IMM GRANULOCYTES NFR BLD AUTO: 0.4 % (ref 0–0.9)
KETONES UR STRIP.AUTO-MCNC: NEGATIVE MG/DL
LACTATE SERPL-SCNC: 0.8 MMOL/L (ref 0.5–2)
LEUKOCYTE ESTERASE UR QL STRIP.AUTO: ABNORMAL
LYMPHOCYTES # BLD AUTO: 1.24 K/UL (ref 1–4.8)
LYMPHOCYTES NFR BLD: 16 % (ref 22–41)
MCH RBC QN AUTO: 30.6 PG (ref 27–33)
MCHC RBC AUTO-ENTMCNC: 33.8 G/DL (ref 32.3–36.5)
MCV RBC AUTO: 90.4 FL (ref 81.4–97.8)
MICRO URNS: ABNORMAL
MONOCYTES # BLD AUTO: 0.71 K/UL (ref 0–0.85)
MONOCYTES NFR BLD AUTO: 9.2 % (ref 0–13.4)
NEUTROPHILS # BLD AUTO: 5.57 K/UL (ref 1.82–7.42)
NEUTROPHILS NFR BLD: 72 % (ref 44–72)
NITRITE UR QL STRIP.AUTO: POSITIVE
NRBC # BLD AUTO: 0 K/UL
NRBC BLD-RTO: 0 /100 WBC (ref 0–0.2)
PH UR STRIP.AUTO: 5.5 [PH] (ref 5–8)
PLATELET # BLD AUTO: 195 K/UL (ref 164–446)
PMV BLD AUTO: 11.4 FL (ref 9–12.9)
POTASSIUM SERPL-SCNC: 3.8 MMOL/L (ref 3.6–5.5)
PROT SERPL-MCNC: 7.4 G/DL (ref 6–8.2)
PROT UR QL STRIP: 30 MG/DL
RBC # BLD AUTO: 4.71 M/UL (ref 4.7–6.1)
RBC # URNS HPF: >150 /HPF
RBC UR QL AUTO: ABNORMAL
SODIUM SERPL-SCNC: 139 MMOL/L (ref 135–145)
SP GR UR STRIP.AUTO: 1.01
WBC # BLD AUTO: 7.7 K/UL (ref 4.8–10.8)
WBC #/AREA URNS HPF: ABNORMAL /HPF

## 2024-05-13 PROCEDURE — 99497 ADVNCD CARE PLAN 30 MIN: CPT | Performed by: INTERNAL MEDICINE

## 2024-05-13 PROCEDURE — 99223 1ST HOSP IP/OBS HIGH 75: CPT | Mod: 25 | Performed by: INTERNAL MEDICINE

## 2024-05-13 RX ORDER — OXYCODONE HYDROCHLORIDE 5 MG/1
2.5 TABLET ORAL
Status: DISCONTINUED | OUTPATIENT
Start: 2024-05-13 | End: 2024-05-14 | Stop reason: HOSPADM

## 2024-05-13 RX ORDER — DIPHENHYDRAMINE HYDROCHLORIDE 50 MG/ML
12.5 INJECTION INTRAMUSCULAR; INTRAVENOUS
Status: DISCONTINUED | OUTPATIENT
Start: 2024-05-13 | End: 2024-05-13 | Stop reason: HOSPADM

## 2024-05-13 RX ORDER — LABETALOL HYDROCHLORIDE 5 MG/ML
10 INJECTION, SOLUTION INTRAVENOUS EVERY 4 HOURS PRN
Status: DISCONTINUED | OUTPATIENT
Start: 2024-05-13 | End: 2024-05-14 | Stop reason: HOSPADM

## 2024-05-13 RX ORDER — SODIUM CHLORIDE, SODIUM LACTATE, POTASSIUM CHLORIDE, CALCIUM CHLORIDE 600; 310; 30; 20 MG/100ML; MG/100ML; MG/100ML; MG/100ML
INJECTION, SOLUTION INTRAVENOUS CONTINUOUS
Status: DISCONTINUED | OUTPATIENT
Start: 2024-05-13 | End: 2024-05-13 | Stop reason: HOSPADM

## 2024-05-13 RX ORDER — PAROXETINE 10 MG/1
10 TABLET, FILM COATED ORAL EVERY MORNING
Status: DISCONTINUED | OUTPATIENT
Start: 2024-05-13 | End: 2024-05-14 | Stop reason: HOSPADM

## 2024-05-13 RX ORDER — LABETALOL HYDROCHLORIDE 5 MG/ML
5 INJECTION, SOLUTION INTRAVENOUS
Status: DISCONTINUED | OUTPATIENT
Start: 2024-05-13 | End: 2024-05-13 | Stop reason: HOSPADM

## 2024-05-13 RX ORDER — EPHEDRINE SULFATE 50 MG/ML
INJECTION, SOLUTION INTRAVENOUS PRN
Status: DISCONTINUED | OUTPATIENT
Start: 2024-05-13 | End: 2024-05-13 | Stop reason: SURG

## 2024-05-13 RX ORDER — ONDANSETRON 2 MG/ML
INJECTION INTRAMUSCULAR; INTRAVENOUS PRN
Status: DISCONTINUED | OUTPATIENT
Start: 2024-05-13 | End: 2024-05-13 | Stop reason: SURG

## 2024-05-13 RX ORDER — EPHEDRINE SULFATE 50 MG/ML
5 INJECTION, SOLUTION INTRAVENOUS
Status: DISCONTINUED | OUTPATIENT
Start: 2024-05-13 | End: 2024-05-13 | Stop reason: HOSPADM

## 2024-05-13 RX ORDER — HYDROMORPHONE HYDROCHLORIDE 1 MG/ML
0.25 INJECTION, SOLUTION INTRAMUSCULAR; INTRAVENOUS; SUBCUTANEOUS
Status: DISCONTINUED | OUTPATIENT
Start: 2024-05-13 | End: 2024-05-14 | Stop reason: HOSPADM

## 2024-05-13 RX ORDER — ZONISAMIDE 100 MG/1
100 CAPSULE ORAL EVERY EVENING
COMMUNITY

## 2024-05-13 RX ORDER — DEXAMETHASONE SODIUM PHOSPHATE 4 MG/ML
INJECTION, SOLUTION INTRA-ARTICULAR; INTRALESIONAL; INTRAMUSCULAR; INTRAVENOUS; SOFT TISSUE PRN
Status: DISCONTINUED | OUTPATIENT
Start: 2024-05-13 | End: 2024-05-13 | Stop reason: SURG

## 2024-05-13 RX ORDER — OXYCODONE HCL 5 MG/5 ML
10 SOLUTION, ORAL ORAL
Status: DISCONTINUED | OUTPATIENT
Start: 2024-05-13 | End: 2024-05-13 | Stop reason: HOSPADM

## 2024-05-13 RX ORDER — CEFAZOLIN SODIUM 1 G/3ML
INJECTION, POWDER, FOR SOLUTION INTRAMUSCULAR; INTRAVENOUS PRN
Status: DISCONTINUED | OUTPATIENT
Start: 2024-05-13 | End: 2024-05-13 | Stop reason: SURG

## 2024-05-13 RX ORDER — OMEPRAZOLE 20 MG/1
20 CAPSULE, DELAYED RELEASE ORAL EVERY MORNING
Status: DISCONTINUED | OUTPATIENT
Start: 2024-05-13 | End: 2024-05-14 | Stop reason: HOSPADM

## 2024-05-13 RX ORDER — HYDROMORPHONE HYDROCHLORIDE 1 MG/ML
0.2 INJECTION, SOLUTION INTRAMUSCULAR; INTRAVENOUS; SUBCUTANEOUS
Status: DISCONTINUED | OUTPATIENT
Start: 2024-05-13 | End: 2024-05-13 | Stop reason: HOSPADM

## 2024-05-13 RX ORDER — LIDOCAINE HYDROCHLORIDE 20 MG/ML
JELLY TOPICAL ONCE
Status: COMPLETED | OUTPATIENT
Start: 2024-05-13 | End: 2024-05-13

## 2024-05-13 RX ORDER — SODIUM CHLORIDE, SODIUM LACTATE, POTASSIUM CHLORIDE, CALCIUM CHLORIDE 600; 310; 30; 20 MG/100ML; MG/100ML; MG/100ML; MG/100ML
INJECTION, SOLUTION INTRAVENOUS CONTINUOUS
Status: ACTIVE | OUTPATIENT
Start: 2024-05-13 | End: 2024-05-13

## 2024-05-13 RX ORDER — HALOPERIDOL 5 MG/ML
1 INJECTION INTRAMUSCULAR
Status: DISCONTINUED | OUTPATIENT
Start: 2024-05-13 | End: 2024-05-13 | Stop reason: HOSPADM

## 2024-05-13 RX ORDER — OXYCODONE HCL 5 MG/5 ML
5 SOLUTION, ORAL ORAL
Status: DISCONTINUED | OUTPATIENT
Start: 2024-05-13 | End: 2024-05-13 | Stop reason: HOSPADM

## 2024-05-13 RX ORDER — SIMVASTATIN 10 MG
5 TABLET ORAL NIGHTLY
Status: DISCONTINUED | OUTPATIENT
Start: 2024-05-13 | End: 2024-05-14 | Stop reason: HOSPADM

## 2024-05-13 RX ORDER — OXYCODONE HYDROCHLORIDE 5 MG/1
5 TABLET ORAL
Status: DISCONTINUED | OUTPATIENT
Start: 2024-05-13 | End: 2024-05-14 | Stop reason: HOSPADM

## 2024-05-13 RX ORDER — ONDANSETRON 2 MG/ML
4 INJECTION INTRAMUSCULAR; INTRAVENOUS
Status: DISCONTINUED | OUTPATIENT
Start: 2024-05-13 | End: 2024-05-13 | Stop reason: HOSPADM

## 2024-05-13 RX ORDER — SODIUM CHLORIDE 9 MG/ML
INJECTION, SOLUTION INTRAVENOUS CONTINUOUS
Status: DISCONTINUED | OUTPATIENT
Start: 2024-05-13 | End: 2024-05-14 | Stop reason: HOSPADM

## 2024-05-13 RX ORDER — LOSARTAN POTASSIUM 50 MG/1
100 TABLET ORAL DAILY
Status: DISCONTINUED | OUTPATIENT
Start: 2024-05-13 | End: 2024-05-14 | Stop reason: HOSPADM

## 2024-05-13 RX ORDER — HYDRALAZINE HYDROCHLORIDE 20 MG/ML
5 INJECTION INTRAMUSCULAR; INTRAVENOUS
Status: DISCONTINUED | OUTPATIENT
Start: 2024-05-13 | End: 2024-05-13 | Stop reason: HOSPADM

## 2024-05-13 RX ORDER — MORPHINE SULFATE 4 MG/ML
4 INJECTION INTRAVENOUS ONCE
Status: COMPLETED | OUTPATIENT
Start: 2024-05-13 | End: 2024-05-13

## 2024-05-13 RX ORDER — HYDROMORPHONE HYDROCHLORIDE 1 MG/ML
0.4 INJECTION, SOLUTION INTRAMUSCULAR; INTRAVENOUS; SUBCUTANEOUS
Status: DISCONTINUED | OUTPATIENT
Start: 2024-05-13 | End: 2024-05-13 | Stop reason: HOSPADM

## 2024-05-13 RX ORDER — LIDOCAINE HYDROCHLORIDE 20 MG/ML
INJECTION, SOLUTION EPIDURAL; INFILTRATION; INTRACAUDAL; PERINEURAL PRN
Status: DISCONTINUED | OUTPATIENT
Start: 2024-05-13 | End: 2024-05-13 | Stop reason: SURG

## 2024-05-13 RX ORDER — HYDROMORPHONE HYDROCHLORIDE 1 MG/ML
0.1 INJECTION, SOLUTION INTRAMUSCULAR; INTRAVENOUS; SUBCUTANEOUS
Status: DISCONTINUED | OUTPATIENT
Start: 2024-05-13 | End: 2024-05-13 | Stop reason: HOSPADM

## 2024-05-13 RX ORDER — ONDANSETRON 2 MG/ML
4 INJECTION INTRAMUSCULAR; INTRAVENOUS ONCE
Status: COMPLETED | OUTPATIENT
Start: 2024-05-13 | End: 2024-05-13

## 2024-05-13 RX ORDER — MIDAZOLAM HYDROCHLORIDE 1 MG/ML
1 INJECTION INTRAMUSCULAR; INTRAVENOUS
Status: DISCONTINUED | OUTPATIENT
Start: 2024-05-13 | End: 2024-05-13 | Stop reason: HOSPADM

## 2024-05-13 RX ORDER — CEFUROXIME AXETIL 500 MG/1
500 TABLET ORAL 2 TIMES DAILY
Status: ON HOLD | COMMUNITY
End: 2024-05-14

## 2024-05-13 RX ORDER — ACETAMINOPHEN 325 MG/1
650 TABLET ORAL EVERY 6 HOURS PRN
Status: DISCONTINUED | OUTPATIENT
Start: 2024-05-13 | End: 2024-05-14 | Stop reason: HOSPADM

## 2024-05-13 RX ORDER — TAMSULOSIN HYDROCHLORIDE 0.4 MG/1
0.4 CAPSULE ORAL
COMMUNITY

## 2024-05-13 RX ORDER — MEPERIDINE HYDROCHLORIDE 25 MG/ML
12.5 INJECTION INTRAMUSCULAR; INTRAVENOUS; SUBCUTANEOUS
Status: DISCONTINUED | OUTPATIENT
Start: 2024-05-13 | End: 2024-05-13 | Stop reason: HOSPADM

## 2024-05-13 RX ADMIN — MIDAZOLAM HYDROCHLORIDE 1 MG: 1 INJECTION, SOLUTION INTRAMUSCULAR; INTRAVENOUS at 12:59

## 2024-05-13 RX ADMIN — CEFAZOLIN 2 G: 1 INJECTION, POWDER, FOR SOLUTION INTRAMUSCULAR; INTRAVENOUS at 13:21

## 2024-05-13 RX ADMIN — MORPHINE SULFATE 4 MG: 4 INJECTION, SOLUTION INTRAMUSCULAR; INTRAVENOUS at 08:31

## 2024-05-13 RX ADMIN — EPHEDRINE SULFATE 5 MG: 50 INJECTION, SOLUTION INTRAVENOUS at 13:17

## 2024-05-13 RX ADMIN — ONDANSETRON 4 MG: 2 INJECTION INTRAMUSCULAR; INTRAVENOUS at 13:48

## 2024-05-13 RX ADMIN — LIDOCAINE HYDROCHLORIDE 100 MG: 20 INJECTION, SOLUTION EPIDURAL; INFILTRATION; INTRACAUDAL at 13:17

## 2024-05-13 RX ADMIN — SIMVASTATIN 5 MG: 10 TABLET, FILM COATED ORAL at 20:02

## 2024-05-13 RX ADMIN — LIDOCAINE HYDROCHLORIDE 1 DOSE: 20 JELLY TOPICAL at 05:30

## 2024-05-13 RX ADMIN — SODIUM CHLORIDE, POTASSIUM CHLORIDE, SODIUM LACTATE AND CALCIUM CHLORIDE: 600; 310; 30; 20 INJECTION, SOLUTION INTRAVENOUS at 13:00

## 2024-05-13 RX ADMIN — INSULIN HUMAN 2 UNITS: 100 INJECTION, SOLUTION PARENTERAL at 20:07

## 2024-05-13 RX ADMIN — PROPOFOL 200 MG: 10 INJECTION, EMULSION INTRAVENOUS at 13:17

## 2024-05-13 RX ADMIN — SODIUM CHLORIDE: 9 INJECTION, SOLUTION INTRAVENOUS at 12:17

## 2024-05-13 RX ADMIN — FENTANYL CITRATE 50 MCG: 50 INJECTION, SOLUTION INTRAMUSCULAR; INTRAVENOUS at 13:12

## 2024-05-13 RX ADMIN — FENTANYL CITRATE 50 MCG: 50 INJECTION, SOLUTION INTRAMUSCULAR; INTRAVENOUS at 13:43

## 2024-05-13 RX ADMIN — DEXAMETHASONE SODIUM PHOSPHATE 4 MG: 4 INJECTION INTRA-ARTICULAR; INTRALESIONAL; INTRAMUSCULAR; INTRAVENOUS; SOFT TISSUE at 13:21

## 2024-05-13 RX ADMIN — ONDANSETRON 4 MG: 2 INJECTION INTRAMUSCULAR; INTRAVENOUS at 08:31

## 2024-05-13 ASSESSMENT — LIFESTYLE VARIABLES
CONSUMPTION TOTAL: NEGATIVE
HOW MANY TIMES IN THE PAST YEAR HAVE YOU HAD 5 OR MORE DRINKS IN A DAY: 0
AVERAGE NUMBER OF DAYS PER WEEK YOU HAVE A DRINK CONTAINING ALCOHOL: 0
HAVE YOU EVER FELT YOU SHOULD CUT DOWN ON YOUR DRINKING: NO
ALCOHOL_USE: YES
HAVE PEOPLE ANNOYED YOU BY CRITICIZING YOUR DRINKING: NO
SUBSTANCE_ABUSE: 0
TOTAL SCORE: 0
EVER FELT BAD OR GUILTY ABOUT YOUR DRINKING: NO
ON A TYPICAL DAY WHEN YOU DRINK ALCOHOL HOW MANY DRINKS DO YOU HAVE: 0
TOTAL SCORE: 0
TOTAL SCORE: 0
EVER HAD A DRINK FIRST THING IN THE MORNING TO STEADY YOUR NERVES TO GET RID OF A HANGOVER: NO

## 2024-05-13 ASSESSMENT — ENCOUNTER SYMPTOMS
DIZZINESS: 0
HEADACHES: 0
NERVOUS/ANXIOUS: 0
ABDOMINAL PAIN: 0
COUGH: 0
NAUSEA: 0
SHORTNESS OF BREATH: 0
BACK PAIN: 0
CHILLS: 0
HEARTBURN: 0
BLURRED VISION: 0
FEVER: 0
DOUBLE VISION: 0
VOMITING: 0
FLANK PAIN: 0
PALPITATIONS: 0
FALLS: 0

## 2024-05-13 ASSESSMENT — PATIENT HEALTH QUESTIONNAIRE - PHQ9
1. LITTLE INTEREST OR PLEASURE IN DOING THINGS: NOT AT ALL
2. FEELING DOWN, DEPRESSED, IRRITABLE, OR HOPELESS: NOT AT ALL
SUM OF ALL RESPONSES TO PHQ9 QUESTIONS 1 AND 2: 0

## 2024-05-13 ASSESSMENT — COGNITIVE AND FUNCTIONAL STATUS - GENERAL
SUGGESTED CMS G CODE MODIFIER DAILY ACTIVITY: CH
MOBILITY SCORE: 24
SUGGESTED CMS G CODE MODIFIER MOBILITY: CH
DAILY ACTIVITIY SCORE: 24

## 2024-05-13 ASSESSMENT — PAIN DESCRIPTION - PAIN TYPE
TYPE: SURGICAL PAIN
TYPE: SURGICAL PAIN
TYPE: ACUTE PAIN
TYPE: SURGICAL PAIN

## 2024-05-13 ASSESSMENT — FIBROSIS 4 INDEX
FIB4 SCORE: 1.6
FIB4 SCORE: 1.6875

## 2024-05-13 NOTE — PROGRESS NOTES
Received report from ER  at 1138. Pt to floor via at 1200. Pt is awake and alert. No signs of distress. Pt denies any nausea.Has 3 way Fc, ongoing CBI, noted strawberry colored UO. . Fall precautions in place, Bed in locked and lowest position and Call light within reach.    1250- patient off to floor      1520- patient back to floor, on 2 liters via NC.   Still on CBI, noted clear yellow output, reports some cramping, Manually irrigated, no clots noted

## 2024-05-13 NOTE — ANESTHESIA PROCEDURE NOTES
Airway    Date/Time: 5/13/2024 1:19 PM    Performed by: Troy Barger D.O.  Authorized by: Troy Barger D.O.    Location:  OR  Urgency:  Elective  Difficult Airway: No    Indications for Airway Management:  Anesthesia      Spontaneous Ventilation: absent    Sedation Level:  Deep  Preoxygenated: Yes    Final Airway Type:  Supraglottic airway  Final Supraglottic Airway:  Standard LMA    SGA Size:  4  Number of Attempts at Approach:  1

## 2024-05-13 NOTE — OR NURSING
Pt allergies and NPO status verified.  Pt verbalizes understanding of pain scale, expected course of stay, and plan of care. Surgical site verified with pt. IV access established. All questions answered. Bed in lowest position, call light within reach.     1250: Patient complaining of severe lower abdominal pain. CBI assessed. CBI not draining properly. CBI turned off, MD Dale aware.     1255: MD Barger updated on patient complaining of severe pain. MD at bedside. Verbal orders given.     1259: Patient medicated per mar.

## 2024-05-13 NOTE — ASSESSMENT & PLAN NOTE
It seems patient with intermittent urinary retention and hematuria since his TURP last month.  Urology has been consulted, we appreciate further conditions.  Patient currently with a catheter and bladder irrigation.  Pending UA at the time of my evaluation.
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
## 2024-05-13 NOTE — ED PROVIDER NOTES
"ED Provider Note    ED Observation Progress Note    Date of Service: 05/13/24    Interval History  Patient is here for hematuria, with CBI initiated by Dr. Rivera.  Patient had reoccurrence of the hematuria, and after talking with Dr. Ruggiero on for urology, patient will be admitted to the hospital service, with urology is consulted, and they like him n.p.o.    Physical Exam  BP (!) 122/90   Pulse 73   Temp 36.7 °C (98.1 °F) (Temporal)   Resp 17   Ht 1.803 m (5' 11\")   Wt 82 kg (180 lb 12.4 oz)   SpO2 91%   BMI 25.21 kg/m² .    Constitutional: Awake and alert. Nontoxic  HENT:  Grossly normal  Eyes: Grossly normal  Neck: Normal range of motion  Cardiovascular: Normal heart rate   Thorax & Lungs: No respiratory distress  Abdomen: Nontender  Skin:  No pathologic rash.   Extremities: Well perfused  Psychiatric: Affect normal    Labs  Results for orders placed or performed during the hospital encounter of 05/13/24   CBC WITH DIFFERENTIAL   Result Value Ref Range    WBC 7.7 4.8 - 10.8 K/uL    RBC 4.71 4.70 - 6.10 M/uL    Hemoglobin 14.4 14.0 - 18.0 g/dL    Hematocrit 42.6 42.0 - 52.0 %    MCV 90.4 81.4 - 97.8 fL    MCH 30.6 27.0 - 33.0 pg    MCHC 33.8 32.3 - 36.5 g/dL    RDW 39.8 35.9 - 50.0 fL    Platelet Count 195 164 - 446 K/uL    MPV 11.4 9.0 - 12.9 fL    Neutrophils-Polys 72.00 44.00 - 72.00 %    Lymphocytes 16.00 (L) 22.00 - 41.00 %    Monocytes 9.20 0.00 - 13.40 %    Eosinophils 1.60 0.00 - 6.90 %    Basophils 0.80 0.00 - 1.80 %    Immature Granulocytes 0.40 0.00 - 0.90 %    Nucleated RBC 0.00 0.00 - 0.20 /100 WBC    Neutrophils (Absolute) 5.57 1.82 - 7.42 K/uL    Lymphs (Absolute) 1.24 1.00 - 4.80 K/uL    Monos (Absolute) 0.71 0.00 - 0.85 K/uL    Eos (Absolute) 0.12 0.00 - 0.51 K/uL    Baso (Absolute) 0.06 0.00 - 0.12 K/uL    Immature Granulocytes (abs) 0.03 0.00 - 0.11 K/uL    NRBC (Absolute) 0.00 K/uL   Comp Metabolic Panel   Result Value Ref Range    Sodium 139 135 - 145 mmol/L    Potassium 3.8 3.6 - " 5.5 mmol/L    Chloride 106 96 - 112 mmol/L    Co2 18 (L) 20 - 33 mmol/L    Anion Gap 15.0 7.0 - 16.0    Glucose 118 (H) 65 - 99 mg/dL    Bun 20 8 - 22 mg/dL    Creatinine 1.01 0.50 - 1.40 mg/dL    Calcium 9.6 8.4 - 10.2 mg/dL    Correct Calcium 9.4 8.5 - 10.5 mg/dL    AST(SGOT) 17 12 - 45 U/L    ALT(SGPT) 13 2 - 50 U/L    Alkaline Phosphatase 93 30 - 99 U/L    Total Bilirubin 0.6 0.1 - 1.5 mg/dL    Albumin 4.2 3.2 - 4.9 g/dL    Total Protein 7.4 6.0 - 8.2 g/dL    Globulin 3.2 1.9 - 3.5 g/dL    A-G Ratio 1.3 g/dL   ESTIMATED GFR   Result Value Ref Range    GFR (CKD-EPI) 82 >60 mL/min/1.73 m 2       Radiology  No orders to display       Problem List  1.  Hematuria  2.  Urinary retention      Electronically signed by: Jose Angel Alegria D.O., 5/13/2024 10:05 AM

## 2024-05-13 NOTE — ED NOTES
Report called to Deirdre Gilmore RN. Patient to be transported to floor at this time via [facility].   Huang draining katie blood and blood tinged urine.

## 2024-05-13 NOTE — ED NOTES
"Pt complains of not being able to urinate. Pt states \"when I am able to pee, its pure blood\".   "

## 2024-05-13 NOTE — OP REPORT
DATE OF SERVICE:  05/13/2024     NAME OF OPERATION:  Cystoscopy with evacuation of clot and fulguration of   bleeding.     PREOPERATIVE DIAGNOSIS:  Clot urinary retention.     POSTOPERATIVE DIAGNOSIS:  Clot urinary retention.     PRIMARY SURGEON:  Jaylen Bravo MD     ANESTHESIOLOGIST:  Troy Barger DO     FINDINGS:  Approximately 300 mL of clot evacuated from the bladder.  Mild   oozing from points of the prostatic fossa, but one region of arterial bleeding   from the 2 o'clock position at the bladder neck, all cauterized.     INDICATIONS:  Briefly, the patient is a 64-year-old gentleman who a month ago   underwent a TURP.  He has had intermittent bouts of hematuria with most   recently resulting in clot urinary retention overnight.  Given the inability   to clear his catheter, indications were for operative management.  Informed   consent has been obtained.     OPERATION IN DETAIL:  The patient was taken to the operating room and placed   on the operating table in supine position.  After administration of general   anesthetic, he was placed in lithotomy.  Genitals were prepped and draped   sterilely.  Male sounds were used to gently dilate the urethral meatus to   28-Hungarian.  A 26-Hungarian resectoscope was passed in the bladder with   visualizing obturator.  In the bladder, there was a significant amount of clot   encountered.     Ольга syringes as well as Ellik  were used to ultimately clear the   bladder of all clots.  This totaled approximately 300 mL.     A bipolar loop was then employed and cystoscopy revealed both ureteral   orifices is intact.  There was mild oozing from the prostatic fossa, but one   particular bleeder, likely arterial from the 2 o'clock position at the bladder   neck.  This was cauterized and additional points of the prostatic fossa were   cauterized.  Small amount of sloughed tissue from the fossa was removed as   well.     On final inspection, there was a very widely  resected prostatic fossa without   any evidence of ongoing bleeding at low pressure filling.     Decision was made to replace Huang catheter for observation overnight given   the patient's experienced postoperatively thus far.  A 20-Kinyarwanda 3-way   catheter was passed easily in the bladder and was draining clear irrigant at   the case conclusion.  It is our intention that the continuous irrigation will   be weaned with a void trial tomorrow morning. Final sense was that he   tolerated the procedure well and was taken to recovery room in stable   condition.        ______________________________  Jaylen Bravo MD MCM/CARISSA    DD:  05/13/2024 13:52  DT:  05/13/2024 14:30    Job#:  [id number]

## 2024-05-13 NOTE — ASSESSMENT & PLAN NOTE
I discussed with patient for at least 16 minutes further goals of care.  This included CODE STATUS which includes full code and DNR/DNI.  The patient like to be full code.  We also discussed further goals of treatment.  The patient like to have full treatment options.  This includes invasive or noninvasive procedures as needed.  In the event that the patient cannot make decisions for himself he would like his brother to make decisions for him.

## 2024-05-13 NOTE — OR SURGEON
Immediate Post OP Note    PreOp Diagnosis: clot retention      PostOp Diagnosis: same      Procedure(s):  CYSTOSCOPY/CLOT EVACUATION    Surgeon(s):  Jaylen Bravo M.D.    Anesthesiologist/Type of Anesthesia:  Anesthesiologist: Troy Barger D.O./* No anesthesia type entered *    Surgical Staff:  Circulator: Lucie Lucero R.N.; Carlos Alberto Brown R.N.  Scrub Person: Sherry Pandya    Specimens removed if any:  * No specimens in log *    Estimated Blood Loss: ~300cc clot in bladder    Findings: mild oozing + 1 arteria bleeder at bladder neck    Complications: none        5/13/2024 1:47 PM Jaylen Bravo M.D.

## 2024-05-13 NOTE — ED NOTES
Blood work drawn, sent to lab.   Patient medicated for pain.   Remains significantly uncomfortable.

## 2024-05-13 NOTE — ED NOTES
Patient's catheter again blocked, manual irrigation unable to resolve blockage.   Huang again changed out per Dr. Alegria verbal instruction.

## 2024-05-13 NOTE — PROGRESS NOTES
Received phone call from ER, urine sample still needed. CBI already started, Dr. Marks informed and still wants sample collected. Chip PATTERSON updated.

## 2024-05-13 NOTE — ASSESSMENT & PLAN NOTE
It seems patient uses metformin at home.  Will do insulin sliding scale while hospitalized.  Monitor.

## 2024-05-13 NOTE — H&P
Hospital Medicine History & Physical Note    Date of Service  5/13/2024    Primary Care Physician  Pcp Pt States None    Consultants  urology      Code Status  Full Code    Chief Complaint  Chief Complaint   Patient presents with    Urinary Retention       History of Presenting Illness  Miguel Angel Fernández is a 64 y.o. male with a past medical history of hypertension, diabetes, Rodriguez's esophagus, recent TURP a month ago who presented 5/13/2024 with intermittent urinary retention with hematuria.    The patient states that he had a TURP about a month ago, since then he has been having intermittent hematuria and urinary retention for which she has gone to urgent care at least 3 times.  The patient states that around 1 AM this morning he again had trouble urinating so he decided to come to the ER for further assessment and evaluation.  The patient denies chest pain, fever, sick contacts or any other focal neurological deficit.    In the ER the patient was placed on bladder irrigation, the patient continues with hematuria.  Urology has been consulted, the patient is currently n.p.o. as he will most likely undergo cystoscopy.  We appreciate further recommendations by urology.    We are pending a urinalysis, for now we will hold antibiotics until evaluated by urology.    I discussed the plan of care with patient, bedside RN, charge RN, , and ER Physician .    Review of Systems  Review of Systems   Constitutional:  Positive for malaise/fatigue. Negative for chills and fever.   HENT:  Negative for hearing loss and nosebleeds.    Eyes:  Negative for blurred vision and double vision.   Respiratory:  Negative for cough and shortness of breath.    Cardiovascular:  Negative for chest pain and palpitations.   Gastrointestinal:  Negative for abdominal pain, heartburn and vomiting.   Genitourinary:  Positive for hematuria.   Musculoskeletal:  Negative for back pain and falls.   Skin:  Negative for itching and rash.    Neurological:  Negative for dizziness and headaches.   Psychiatric/Behavioral:  Negative for substance abuse. The patient is not nervous/anxious.    All other systems reviewed and are negative.      Past Medical History   has a past medical history of Anxiety, Bowel habit changes, Chickenpox (as child), Closed head injury (2004), Cold (10/25/2018), Depression, Dermatitis, Diabetes ([facility]), Elevated alkaline phosphatase level (12/5/2018), Frequent headaches (1/26/2016), GERD (gastroesophageal reflux disease), [place] measles (as child), Heart burn, Hiatus hernia syndrome, Hyperglycemia (1/26/2016), Hypertension, Incomplete tear of right rotator cuff (10/8/2018), Indigestion, Kidney stone, Major depressive disorder with single episode, in full remission ([facility]) (11/22/2010), Psychiatric problem, Right shoulder pain (10/25/2018), Rosacea (1/26/2016), Sleep apnea, and Snoring.    Surgical History   has a past surgical history that includes orif, elbow (Left, 10/2004); repair, knee, acl (Left, 08/2002); septoturbinoplasty (8/17/2010); ventral hernia repair (9/28/2016); meniscectomy (Right, 2010); hip arthroplasty total (Left, 12/30/2016); eye surgery (Right, 2008); testicle exploration (1972); cystoscopy (04/2010); dental extraction(s) (1986); colonoscopy (06/2018); shoulder decompression arthroscopic (Right, 11/1/2018); clavicle distal excision (Right, 11/1/2018); orchiectomy (Left, 9/23/2022); pr cystoscopy,insert ureteral stent (Left, 10/5/2022); and lasertripsy (Left, 10/5/2022).     Family History  family history includes Diabetes in his father; Heart Attack (age of onset: 60) in his father.       Social History   reports that he quit smoking about 42 years ago. His smoking use included cigarettes. He started smoking about 47 years ago. He has a 5 pack-year smoking history. He has never used smokeless tobacco. He reports that he does not currently use alcohol. He reports that he does not currently use  drugs.    Allergies  No Known Allergies    Medications  Prior to Admission Medications   Prescriptions Last Dose Informant Patient Reported? Taking?   Ascorbic Acid (VITAMIN C PO) 5/12/2024 at 1200 Patient Yes Yes   Sig: Take 1 Tablet by mouth every day.   Cholecalciferol (D3 PO) 5/12/2024 at 1200 Patient Yes Yes   Sig: Take 1 Capsule by mouth every day.   Cyanocobalamin (B-12 PO) 5/12/2024 at 1200 Patient Yes Yes   Sig: Take 1 Tablet by mouth every day.   Erenumab-aooe (AIMOVIG) 140 MG/ML Solution Auto-injector 4/14/2024 at Unknown Patient Yes No   Sig: Inject 140 mg under the skin every 30 (thirty) days. Last dose was taken on 4/14/2024, next injection is due on 5/14/2024   PARoxetine (PAXIL) 10 MG Tab 5/12/2024 at 0900 Patient Yes Yes   Sig: Take 1 Tablet by mouth every morning.   asa/apap/caffeine (EXCEDRIN) 250-250-65 MG Tab 5/11/2024 at Unknown Patient Yes No   Sig: Take 2 Tablets by mouth every 6 hours as needed for Headache.   cefUROXime (CEFTIN) 500 MG Tab 5/7/2024 at FINISHED Patient Yes Yes   Sig: Take 500 mg by mouth 2 times a day. Pt started on 5/3/2024 for 5 day course   dupilumab (DUPIXENT) 300 MG/2ML injection 4/30/2024 at Unknown Patient Yes Yes   Sig: Inject 300 mg under the skin every 14 days. Last dose was taken on 4/30/2024   hydrOXYzine HCl (ATARAX) 25 MG Tab 5/12/2024 at 2030 Patient Yes Yes   Sig: Take 25 mg by mouth at bedtime. Indications: Feeling Anxious   losartan (COZAAR) 100 MG Tab 5/12/2024 at 0900 Patient Yes Yes   Sig: Take 1 Tablet by mouth every day. FOR 30 DAYS   magnesium oxide (MAG-OX) 400 MG Tab 5/12/2024 at 0900 Patient Yes Yes   Sig: Take 1 Tablet by mouth every day.   metFORMIN (GLUCOPHAGE) 500 MG Tab 5/12/2024 at 1800 Patient No Yes   Sig: Take 1 Tab by mouth 2 times a day, with meals.   Patient taking differently: Take 500 mg by mouth every evening. Pt is taken QDAY, not BID   omeprazole (PRILOSEC) 20 MG delayed-release capsule 5/12/2024 at 0900 Patient Yes Yes   Sig: Take  1 Capsule by mouth every morning.   sildenafil citrate (VIAGRA) 50 MG tablet 4/14/2024 at Unknown Patient Yes No   Sig: Take 1 Tablet by mouth 1 time a day as needed for Erectile Dysfunction.   simvastatin (ZOCOR) 5 MG Tab 5/12/2024 at 2030 Patient Yes Yes   Sig: Take 1 Tablet by mouth every evening.   tamsulosin (FLOMAX) 0.4 MG capsule > 10 days at STOPPED Patient Yes Yes   Sig: Take 0.4 mg by mouth 1/2 hour after breakfast.   zonisamide (ZONEGRAN) 100 MG Cap 5/12/2024 at 2030 Patient Yes Yes   Sig: Take 100 mg by mouth every evening.      Facility-Administered Medications: None       Physical Exam  Temp:  [36.7 °C (98.1 °F)] 36.7 °C (98.1 °F)  Pulse:  [73-82] 73  Resp:  [17] 17  BP: (122-125)/(87-90) 122/90  SpO2:  [91 %-94 %] 91 %  Blood Pressure : (!) 122/90   Temperature: 36.7 °C (98.1 °F)   Pulse: 73   Respiration: 17   Pulse Oximetry: 91 %       Physical Exam  Vitals and nursing note reviewed.   Constitutional:       Appearance: He is ill-appearing.   HENT:      Head: Normocephalic and atraumatic.      Right Ear: External ear normal.      Left Ear: External ear normal.      Nose: Nose normal.      Mouth/Throat:      Mouth: Mucous membranes are moist.      Pharynx: Oropharynx is clear.   Eyes:      General:         Right eye: No discharge.         Left eye: No discharge.      Extraocular Movements: Extraocular movements intact.      Pupils: Pupils are equal, round, and reactive to light.   Cardiovascular:      Rate and Rhythm: Normal rate and regular rhythm.      Heart sounds: No murmur heard.  Pulmonary:      Effort: Pulmonary effort is normal. No respiratory distress.      Breath sounds: Normal breath sounds.   Abdominal:      General: Abdomen is flat. Bowel sounds are normal. There is no distension.      Palpations: Abdomen is soft.      Tenderness: There is no abdominal tenderness.   Genitourinary:     Comments: Catheter in place, hematuria visible  Musculoskeletal:      Cervical back: Normal range of  "motion and neck supple.      Right lower leg: No edema.      Left lower leg: No edema.   Skin:     General: Skin is warm.   Neurological:      General: No focal deficit present.      Mental Status: He is alert and oriented to person, place, and time.   Psychiatric:         Mood and Affect: Mood normal.         Behavior: Behavior normal.         Laboratory:  Recent Labs     05/13/24  0933   WBC 7.7   RBC 4.71   HEMOGLOBIN 14.4   HEMATOCRIT 42.6   MCV 90.4   MCH 30.6   MCHC 33.8   RDW 39.8   PLATELETCT 195   MPV 11.4     Recent Labs     05/13/24  0835   SODIUM 139   POTASSIUM 3.8   CHLORIDE 106   CO2 18*   GLUCOSE 118*   BUN 20   CREATININE 1.01   CALCIUM 9.6     Recent Labs     05/13/24  0835   ALTSGPT 13   ASTSGOT 17   ALKPHOSPHAT 93   TBILIRUBIN 0.6   GLUCOSE 118*         No results for input(s): \"NTPROBNP\" in the last 72 hours.      No results for input(s): \"TROPONINT\" in the last 72 hours.    Imaging:  No orders to display           Assessment/Plan:      * Hematuria  Assessment & Plan  Patient having intermittent hematuria with urinary retention since his TURP last month.  Urology has been consulted.  Currently with catheter and ongoing bladder irrigation.  Will hold antibiotics for now, pending UA at the time of evaluation.  Urology has been consulted, we appreciate further recommendations.  Patient currently n.p.o. as he will most likely undergo cystoscopy.    Urinary retention  Assessment & Plan  It seems patient with intermittent urinary retention and hematuria since his TURP last month.  Urology has been consulted, we appreciate further conditions.  Patient currently with a catheter and bladder irrigation.  Pending UA at the time of my evaluation.    Metabolic acidosis  Assessment & Plan  Continue IV fluids for now.  We have ordered lactic acid    Advance care planning  Assessment & Plan  I discussed with patient for at least 16 minutes further goals of care.  This included CODE STATUS which includes full " code and DNR/DNI.  The patient like to be full code.  We also discussed further goals of treatment.  The patient like to have full treatment options.  This includes invasive or noninvasive procedures as needed.  In the event that the patient cannot make decisions for himself he would like his brother to make decisions for him.    Rodriguez's esophagus without dysplasia- (present on admission)  Assessment & Plan  Continue home omeprazole.    Type 2 diabetes mellitus without complication, without long-term current use of insulin (HCC)- (present on admission)  Assessment & Plan  It seems patient uses metformin at home.  Will do insulin sliding scale while hospitalized.  Monitor.    HTN (hypertension)- (present on admission)  Assessment & Plan  Continue home medication.    Major depressive disorder with single episode, in full remission (HCC)- (present on admission)  Assessment & Plan  Continue home medication.        VTE prophylaxis: SCDs/TEDs    I spend at least 76 minutes providing care for this patient.  This include face-to-face interview, physical examination.  Review of lab work including CBC, CMP.  Discussion with the ER physician.  Discussion with multidisciplinary team including case management, nursing staff and pharmacy.  Creating plan of care, reviewing orders.    Moreover, I discussed with patient for at least 16 minutes further goals of care.  This included CODE STATUS which includes full code and DNR/DNI.  The patient like to be full code.  We also discussed further goals of treatment.  The patient like to have full treatment options.  This includes invasive or noninvasive procedures as needed.  In the event that the patient cannot make decisions for himself he would like his brother to make decisions for him.

## 2024-05-13 NOTE — ANESTHESIA PREPROCEDURE EVALUATION
Case: [id number] Date/Time: 05/13/24 1300    Procedure: CYSTOSCOPY/CLOT EVACUATION    Location:  OR 02 / SURGERY [facility]    Surgeons: Jaylen Bravo M.D.            Relevant Problems   ANESTHESIA   (positive) FRANK on CPAP      NEURO   (positive) Cervicogenic headache   (positive) Chronic migraine   (positive) Frequent headaches   (positive) Medication overuse headache      CARDIAC   (positive) Chronic migraine   (positive) HTN (hypertension)      GI   (positive) Acid reflux      ENDO   (positive) Type 2 diabetes mellitus without complication, without long-term current use of insulin (HCC)       Physical Exam    Airway   Mallampati: II  TM distance: >3 FB  Neck ROM: full       Cardiovascular - normal exam  Rhythm: regular  Rate: normal  (-) murmur     Dental - normal exam           Pulmonary - normal exam  Breath sounds clear to auscultation     Abdominal    Neurological - normal exam                   Anesthesia Plan    ASA 2       Plan - general       Airway plan will be LMA          Induction: intravenous    Postoperative Plan: Postoperative administration of opioids is intended.    Pertinent diagnostic labs and testing reviewed    Informed Consent:    Anesthetic plan and risks discussed with patient.    Use of blood products discussed with: patient whom consented to blood products.

## 2024-05-13 NOTE — ANESTHESIA TIME REPORT
Anesthesia Start and Stop Event Times       Date Time Event    5/13/2024 1312 Anesthesia Start     1400 Anesthesia Stop          Responsible Staff  05/13/24      Name Role Begin End    SIXTO RappO. Anesth 1312 1400          Overtime Reason:  no overtime (within assigned shift)    Comments:
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
## 2024-05-13 NOTE — CARE PLAN
The patient is Stable - Low risk of patient condition declining or worsening    Shift Goals  Clinical Goals: Monitor patients UO  Patient Goals: pain mngt    Progress made toward(s) clinical / shift goals:  Patient had Cystoscopy with evacuation of clots this afternoon, still patient remained on CBI, noted output is clear light yellow,No clots noted. To continue to monitor and possibly do voiding trial tomorrow morning    Patient is not progressing towards the following goals:      Problem: Knowledge Deficit - Standard  Goal: Patient and family/care givers will demonstrate understanding of plan of care, disease process/condition, diagnostic tests and medications  Outcome: Progressing     Problem: Urinary Elimination  Goal: Establish and maintain regular urinary output  Outcome: Progressing     Problem: Neuro Status  Goal: Neuro status will remain stable or improve  Outcome: Progressing     Problem: Neurovascular Monitoring  Goal: Patient's neurovascular status will be maintained or improve  Outcome: Progressing     Problem: Respiratory/Oxygenation Function Post-Surgical  Goal: Patient will achieve/maintain normal respiratory rate/effort  Outcome: Progressing     Problem: Early Mobilization - Post Surgery  Goal: Early mobilization post surgery  Outcome: Progressing     Problem: Pain - Post Surgery  Goal: Alleviation or reduction of pain post surgery  Outcome: Progressing     Problem: Respiratory  Goal: Patient will achieve/maintain optimum respiratory ventilation and gas exchange  Outcome: Progressing

## 2024-05-13 NOTE — ED NOTES
Existing 3 way zavala removed, and replaced with a new zavala. It immediately drained 500 ml BRB/heavily blood tinged urine and patient expressed significant relief.

## 2024-05-13 NOTE — ED NOTES
Medication history reviewed with pt. Med rec is complete.  Allergies reviewed, per pt    Patient has had outpatient antibiotics in the last 30 days, pt started CEFTIN 500MG for 5 day course.  Per pt reports that he finished course of antibiotics     Pt is not on any anticoagulants

## 2024-05-13 NOTE — ED NOTES
How Severe Is Your Skin Lesion?: moderate Patient's zavala stopped draining again.    Has Your Skin Lesion Been Treated?: not been treated Is This A New Presentation, Or A Follow-Up?: Growth Additional History: Has tried OTC medication which was not helpful Is This A New Presentation, Or A Follow-Up?: Growths

## 2024-05-13 NOTE — ED TRIAGE NOTES
HX of chronic need for Huang cath pt had one removed last week   Today at 0100 he noted he's retaining urine and when he does urinate its very bloody

## 2024-05-13 NOTE — ANESTHESIA POSTPROCEDURE EVALUATION
Patient: Miguel Angel Fernández    Procedure Summary       Date: 05/13/24 Room / Location:  OR 02 / SURGERY [facility]    Anesthesia Start: 1312 Anesthesia Stop: 1400    Procedure: CYSTOSCOPY/CLOT EVACUATION (Bladder) Diagnosis: (clot retention)    Surgeons: Jaylen Bravo M.D. Responsible Provider: Troy Barger D.O.    Anesthesia Type: general ASA Status: 2            Final Anesthesia Type: general  Last vitals  BP   Blood Pressure : (!) 124/91    Temp   37.1 °C (98.7 °F)    Pulse   100   Resp   17    SpO2   92 %      Anesthesia Post Evaluation    Patient location during evaluation: PACU  Patient participation: complete - patient participated  Level of consciousness: awake and alert    Airway patency: patent  Anesthetic complications: no  Cardiovascular status: hemodynamically stable  Respiratory status: acceptable  Hydration status: euvolemic    PONV: none          No notable events documented.     Nurse Pain Score: 1 (NPRS)

## 2024-05-13 NOTE — ASSESSMENT & PLAN NOTE
Patient having intermittent hematuria with urinary retention since his TURP last month.  Urology has been consulted.  Currently with catheter and ongoing bladder irrigation.  Will hold antibiotics for now, pending UA at the time of evaluation.  Urology has been consulted, we appreciate further recommendations.  Patient currently n.p.o. as he will most likely undergo cystoscopy.

## 2024-05-13 NOTE — PROGRESS NOTES
4 Eyes Skin Assessment Completed by YESENIA Carty and YESENIA Couch.    Head WDL  Ears WDL  Nose WDL  Mouth WDL  Neck WDL  Breast/Chest WDL  Shoulder Blades WDL  Spine WDL  (R) Arm/Elbow/Hand WDL  (L) Arm/Elbow/Hand 2nd toe dry wound  Abdomen WDL  Groin WDL  Scrotum/Coccyx/Buttocks Redness and Blanching  (R) Leg scab  (L) Leg scar  (R) Heel/Foot/Toe WDL  (L) Heel/Foot/Toe WDL          Devices In Places Pulse Ox, Huang cath, BP cuff, O2       Interventions In Place Pillows, NC with gray foam    Possible Skin Injury No    Pictures Uploaded Into Epic N/A  Wound Consult Placed N/A  RN Wound Prevention Protocol Ordered No

## 2024-05-13 NOTE — OR NURSING
1354 Pt arrived from OR, report received from anesthesiologist and RN. Pt sedated at this time. OPA in place, respirations spontaneous and unlabored. Zavala in place, CBI running, urine noted to be clear and colorless.    1400 OPA removed, respirations spontaneous an unlabored.     1415 Pt states penis sore from zavala, denies nausea.     1430 Pt weaned to RA, provided with PO fluids.     1445 No changes     1454 Pt meets criteria to transfer to room. Report called to Machelle PATTERSON.     1506 Pt transferred to room by transport, chart and belongings on gurney at time of transfer. Pt on 2L via NC, tank over 3/4ths full.   CBI still running at time of transfer, urine pale, clear yellow.

## 2024-05-13 NOTE — ED NOTES
0545 3 way zavala inserted for CBI   Dark blood output noted  Manual irrigation done karlene clot

## 2024-05-14 VITALS
DIASTOLIC BLOOD PRESSURE: 79 MMHG | OXYGEN SATURATION: 92 % | TEMPERATURE: 97.8 F | BODY MASS INDEX: 26.02 KG/M2 | HEART RATE: 60 BPM | RESPIRATION RATE: 17 BRPM | HEIGHT: 71 IN | WEIGHT: 185.85 LBS | SYSTOLIC BLOOD PRESSURE: 138 MMHG

## 2024-05-14 LAB
ALBUMIN SERPL BCP-MCNC: 3.8 G/DL (ref 3.2–4.9)
ALBUMIN/GLOB SERPL: 1.5 G/DL
ALP SERPL-CCNC: 73 U/L (ref 30–99)
ALT SERPL-CCNC: 10 U/L (ref 2–50)
ANION GAP SERPL CALC-SCNC: 9 MMOL/L (ref 7–16)
AST SERPL-CCNC: 13 U/L (ref 12–45)
BASOPHILS # BLD AUTO: 0.3 % (ref 0–1.8)
BASOPHILS # BLD: 0.02 K/UL (ref 0–0.12)
BILIRUB SERPL-MCNC: 0.4 MG/DL (ref 0.1–1.5)
BUN SERPL-MCNC: 16 MG/DL (ref 8–22)
CALCIUM ALBUM COR SERPL-MCNC: 9 MG/DL (ref 8.5–10.5)
CALCIUM SERPL-MCNC: 8.8 MG/DL (ref 8.4–10.2)
CHLORIDE SERPL-SCNC: 107 MMOL/L (ref 96–112)
CO2 SERPL-SCNC: 21 MMOL/L (ref 20–33)
CREAT SERPL-MCNC: 0.87 MG/DL (ref 0.5–1.4)
EOSINOPHIL # BLD AUTO: 0 K/UL (ref 0–0.51)
EOSINOPHIL NFR BLD: 0 % (ref 0–6.9)
ERYTHROCYTE [DISTWIDTH] IN BLOOD BY AUTOMATED COUNT: 41.1 FL (ref 35.9–50)
GFR SERPLBLD CREATININE-BSD FMLA CKD-EPI: 95 ML/MIN/1.73 M 2
GLOBULIN SER CALC-MCNC: 2.5 G/DL (ref 1.9–3.5)
GLUCOSE BLD STRIP.AUTO-MCNC: 114 MG/DL (ref 65–99)
GLUCOSE SERPL-MCNC: 142 MG/DL (ref 65–99)
HCT VFR BLD AUTO: 38.1 % (ref 42–52)
HGB BLD-MCNC: 12.8 G/DL (ref 14–18)
IMM GRANULOCYTES # BLD AUTO: 0.04 K/UL (ref 0–0.11)
IMM GRANULOCYTES NFR BLD AUTO: 0.5 % (ref 0–0.9)
LYMPHOCYTES # BLD AUTO: 1.4 K/UL (ref 1–4.8)
LYMPHOCYTES NFR BLD: 18.8 % (ref 22–41)
MAGNESIUM SERPL-MCNC: 2 MG/DL (ref 1.5–2.5)
MCH RBC QN AUTO: 30.8 PG (ref 27–33)
MCHC RBC AUTO-ENTMCNC: 33.6 G/DL (ref 32.3–36.5)
MCV RBC AUTO: 91.6 FL (ref 81.4–97.8)
MONOCYTES # BLD AUTO: 0.8 K/UL (ref 0–0.85)
MONOCYTES NFR BLD AUTO: 10.7 % (ref 0–13.4)
NEUTROPHILS # BLD AUTO: 5.2 K/UL (ref 1.82–7.42)
NEUTROPHILS NFR BLD: 69.7 % (ref 44–72)
NRBC # BLD AUTO: 0 K/UL
NRBC BLD-RTO: 0 /100 WBC (ref 0–0.2)
PLATELET # BLD AUTO: 184 K/UL (ref 164–446)
PMV BLD AUTO: 11.7 FL (ref 9–12.9)
POTASSIUM SERPL-SCNC: 3.9 MMOL/L (ref 3.6–5.5)
PROT SERPL-MCNC: 6.3 G/DL (ref 6–8.2)
RBC # BLD AUTO: 4.16 M/UL (ref 4.7–6.1)
SODIUM SERPL-SCNC: 137 MMOL/L (ref 135–145)
WBC # BLD AUTO: 7.5 K/UL (ref 4.8–10.8)

## 2024-05-14 PROCEDURE — 99239 HOSP IP/OBS DSCHRG MGMT >30: CPT | Performed by: INTERNAL MEDICINE

## 2024-05-14 RX ADMIN — PAROXETINE HYDROCHLORIDE 10 MG: 10 TABLET, FILM COATED ORAL at 05:02

## 2024-05-14 RX ADMIN — OMEPRAZOLE 20 MG: 20 CAPSULE, DELAYED RELEASE ORAL at 05:02

## 2024-05-14 RX ADMIN — OXYCODONE HYDROCHLORIDE 5 MG: 5 TABLET ORAL at 06:40

## 2024-05-14 RX ADMIN — SODIUM CHLORIDE: 9 INJECTION, SOLUTION INTRAVENOUS at 03:47

## 2024-05-14 RX ADMIN — ACETAMINOPHEN 650 MG: 325 TABLET ORAL at 00:30

## 2024-05-14 ASSESSMENT — COGNITIVE AND FUNCTIONAL STATUS - GENERAL
DAILY ACTIVITIY SCORE: 24
SUGGESTED CMS G CODE MODIFIER DAILY ACTIVITY: CH
SUGGESTED CMS G CODE MODIFIER MOBILITY: CH
MOBILITY SCORE: 24

## 2024-05-14 ASSESSMENT — ENCOUNTER SYMPTOMS
NAUSEA: 0
ABDOMINAL PAIN: 0
CHILLS: 0
FEVER: 0
VOMITING: 0
FLANK PAIN: 0

## 2024-05-14 ASSESSMENT — PAIN DESCRIPTION - PAIN TYPE
TYPE: ACUTE PAIN;CHRONIC PAIN
TYPE: CHRONIC PAIN
TYPE: CHRONIC PAIN

## 2024-05-14 NOTE — PROGRESS NOTES
Voiding trial successful. Instilled 200 cc of saline into catheter through gravity then removed zavala catheter. In an instant, the patient urinated and measured output at 200 cc. Output is clear, light pink.

## 2024-05-14 NOTE — PROGRESS NOTES
Received bedside patient report from YESENIA Carty. Patient resting comfortably in bed, no complaints at this time. Safety precautions in place. Will continue to monitor.

## 2024-05-14 NOTE — PROGRESS NOTES
Received report from night shift RN, assumed care of pt. AAOx4, VSS on room air. Pt denies pain or nausea this morning. Sitting up in bed, eager for discharge today. Voiding into urinal with no difficulty. Pt states he does have a PCP appt at 1320. Updated on plan of care for the day, pt educated to call for assistance.

## 2024-05-14 NOTE — PROGRESS NOTES
Pt discharged home in good and stable condition. Reviewed all discharge instructions and answered any questions. IV discontinued. Escorted off the unit at 1130.

## 2024-05-14 NOTE — PROGRESS NOTES
Urine clear yellow in color and clarity, If urine light pink to clear with CBI on low flow, ok to clamp per nursing communication order. Clamped at 0001.

## 2024-05-14 NOTE — DISCHARGE SUMMARY
Discharge Summary    CHIEF COMPLAINT ON ADMISSION  Chief Complaint   Patient presents with    Urinary Retention       Reason for Admission  Urinary retention     Admission Date  5/13/2024    CODE STATUS  FULL    HPI & HOSPITAL COURSE  This is a 64 y.o. male with a history of hypertension, diabetes, esophagus and a recent TURP admitted for blood in the urine associated with intermittent urinary retention.  Urology was consulted and they recommended cystoscopy with clot evacuation which was performed on 5/13/2024 without complication.  He was maintained on an irrigation Huang catheter following his procedure and his urine slowly cleared.  On 5/14 his catheter was removed and he was able to void easily.  He will follow-up with urology as scheduled in 2 days.  He had no evidence of ongoing bleeding and was discharged in stable condition    Therefore, he is discharged in fair and stable condition to home with close outpatient follow-up.    The patient recovered much more quickly than anticipated on admission.    Discharge Date  5/14/2024    FOLLOW UP ITEMS POST DISCHARGE  Follow-up with urology as scheduled in 48 hours    DISCHARGE DIAGNOSES  Principal Problem:    Hematuria (POA: Unknown)  Active Problems:    Major depressive disorder with single episode, in full remission (HCC) (POA: Yes)    HTN (hypertension) (POA: Yes)    Type 2 diabetes mellitus without complication, without long-term current use of insulin (HCC) (POA: Yes)    Rodriguez's esophagus without dysplasia (POA: Yes)    Urinary retention (POA: Unknown)    Advance care planning (POA: Unknown)    Metabolic acidosis (POA: Unknown)  Resolved Problems:    * No resolved hospital problems. *      FOLLOW UP  No future appointments.  Miguel Angel Lynn M.D.  [address]  [place] [id number]  [phone]    Follow up  [facility] will contact patient to arrange outpatient follow up.      MEDICATIONS ON DISCHARGE     Medication List        CHANGE how you take these  medications        Instructions   metFORMIN 500 MG Tabs  What changed:   when to take this  additional instructions  Commonly known as: Glucophage   Take 1 Tab by mouth 2 times a day, with meals.  Dose: 500 mg            CONTINUE taking these medications        Instructions   Aimovig 140 MG/ML Soaj  Generic drug: Erenumab-aooe   Inject 140 mg under the skin every 30 (thirty) days. Last dose was taken on 4/14/2024, next injection is due on 5/14/2024  Dose: 140 mg     asa/apap/caffeine 250-250-65 MG Tabs  Commonly known as: Excedrin   Take 2 Tablets by mouth every 6 hours as needed for Headache.  Dose: 2 Tablet     B-12 PO   Take 1 Tablet by mouth every day.  Dose: 1 Tablet     D3 PO   Take 1 Capsule by mouth every day.  Dose: 1 Capsule     dupilumab 300 MG/2ML injection  Commonly known as: Dupixent   Inject 300 mg under the skin every 14 days. Last dose was taken on 4/30/2024  Dose: 300 mg     hydrOXYzine HCl 25 MG Tabs  Commonly known as: Atarax   Take 25 mg by mouth at bedtime. Indications: Feeling Anxious  Dose: 25 mg     losartan 100 MG Tabs  Commonly known as: Cozaar   Take 1 Tablet by mouth every day. FOR 30 DAYS  Dose: 100 mg     magnesium oxide 400 MG Tabs tablet  Commonly known as: Mag-Ox   Take 1 Tablet by mouth every day.  Dose: 400 mg     omeprazole 20 MG delayed-release capsule  Commonly known as: PriLOSEC   Take 1 Capsule by mouth every morning.  Dose: 20 mg     PARoxetine 10 MG Tabs  Commonly known as: Paxil   Take 1 Tablet by mouth every morning.  Dose: 10 mg     sildenafil citrate 50 MG tablet  Commonly known as: Viagra   Take 1 Tablet by mouth 1 time a day as needed for Erectile Dysfunction.  Dose: 50 mg     simvastatin 5 MG Tabs  Commonly known as: Zocor   Take 1 Tablet by mouth every evening.  Dose: 5 mg     tamsulosin 0.4 MG capsule  Commonly known as: Flomax   Take 0.4 mg by mouth 1/2 hour after breakfast.  Dose: 0.4 mg     VITAMIN C PO   Take 1 Tablet by mouth every day.  Dose: 1 Tablet      zonisamide 100 MG Caps  Commonly known as: Zonegran   Take 100 mg by mouth every evening.  Dose: 100 mg            STOP taking these medications      cefUROXime 500 MG Tabs  Commonly known as: Ceftin              Allergies  No Known Allergies    DIET  As tolerated    ACTIVITY  As tolerated.  Weight bearing as tolerated    CONSULTATIONS  Urology, Dr. Ruggiero    PROCEDURES  Cystoscopy with evacuation of clot and fulguration of bleeding on 5/13/2024    LABORATORY  Lab Results   Component Value Date    SODIUM 137 05/14/2024    POTASSIUM 3.9 05/14/2024    CHLORIDE 107 05/14/2024    CO2 21 05/14/2024    GLUCOSE 142 (H) 05/14/2024    BUN 16 05/14/2024    CREATININE 0.87 05/14/2024    CREATININE 1.06 10/08/2012        Lab Results   Component Value Date    WBC 7.5 05/14/2024    WBC 5.7 10/08/2012    HEMOGLOBIN 12.8 (L) 05/14/2024    HEMATOCRIT 38.1 (L) 05/14/2024    PLATELETCT 184 05/14/2024        Total time of the discharge process exceeds 42 minutes.

## 2024-05-14 NOTE — CARE PLAN
The patient is Stable - Low risk of patient condition declining or worsening    Shift Goals  Clinical Goals: Free from falls or injuries, rest and sleep for at least 4 hours, pain controlled at 3/10 or better with current regimen  Patient Goals: Free from falls or injuries, rest and sleep for at least 4 hours, pain controlled at 3/10 or better with current regimen  Family Goals:    Progress made toward(s) clinical / shift goals: No falls or injuries overnight, rested and slept at least 4 hours overnight, pain controlled at 3/10 or better with current regimen    Patient is not progressing towards the following goals:

## 2024-05-14 NOTE — PROGRESS NOTES
Note to reader: this note follows the APSO format rather than the historical SOAP format. Assessment and plan located at the top of the note for ease of use.    Chief Complaint  64 y.o. year old male here with Urinary Retention      Assessment/Plan  Interval History   Active Hospital Problems    Diagnosis     Hematuria [R31.9]     Urinary retention [R33.9]     Advance care planning [Z71.89]     Metabolic acidosis [E87.20]     Rodriguez's esophagus without dysplasia [K22.70]     Type 2 diabetes mellitus without complication, without long-term current use of insulin (HCC) [E11.9]     HTN (hypertension) [I10]     Major depressive disorder with single episode, in full remission (HCC) [F32.5]      5/14 POD1. CBI clamped overnight, urine clear yellow in zavala tubing. AFVSS, labs stable. Reports feeling hungry, but denies N/V/F/C, abd pain, flank pain.    Plan:  - nursing to complete TOV prior to discharge  - OK to discharge today from urology perspective  - [facility] will contact patient to arrange outpatient follow up  - No further urologic intervention anticipated during this admission. Urology signing off, please call with questions.       Case discussed with Dr Bravo, who has directed this patient's plan of care.      Review of Systems  Physical Exam   Review of Systems   Constitutional:  Negative for chills and fever.   Gastrointestinal:  Negative for abdominal pain, nausea and vomiting.   Genitourinary:  Negative for flank pain and hematuria.   All other systems reviewed and are negative.    Vitals:    05/13/24 2042 05/14/24 0022 05/14/24 0425 05/14/24 0508   BP: 112/67 106/67 106/64    Pulse: 80 70 60    Resp: 17 17 17    Temp: 36.6 °C (97.9 °F) 36.5 °C (97.7 °F) 36.4 °C (97.5 °F)    TempSrc: Temporal Temporal Temporal    SpO2: 97% 97% 97% 96%   Weight:       Height:         Physical Exam  Vitals and nursing note reviewed.   Constitutional:       General: He is not in acute distress.  HENT:      Head:  Normocephalic.      Nose: Nose normal.      Mouth/Throat:      Pharynx: Oropharynx is clear.   Eyes:      Conjunctiva/sclera: Conjunctivae normal.   Pulmonary:      Effort: Pulmonary effort is normal.   Abdominal:      General: There is no distension.      Palpations: Abdomen is soft.   Genitourinary:     Comments: 3 way zavala in place, CBI clamped, urine clear yellow in zavala tubing  Musculoskeletal:         General: Normal range of motion.      Cervical back: Normal range of motion.   Skin:     General: Skin is warm.   Neurological:      General: No focal deficit present.      Mental Status: He is alert and oriented to person, place, and time.   Psychiatric:         Mood and Affect: Mood normal.         Behavior: Behavior normal.          Hematology Chemistry   Lab Results   Component Value Date/Time    WBC 7.5 05/14/2024 12:39 AM    WBC 5.7 10/08/2012 08:32 AM    HEMOGLOBIN 12.8 (L) 05/14/2024 12:39 AM    HEMATOCRIT 38.1 (L) 05/14/2024 12:39 AM    PLATELETCT 184 05/14/2024 12:39 AM     Lab Results   Component Value Date/Time    SODIUM 137 05/14/2024 12:39 AM    POTASSIUM 3.9 05/14/2024 12:39 AM    CHLORIDE 107 05/14/2024 12:39 AM    CO2 21 05/14/2024 12:39 AM    GLUCOSE 142 (H) 05/14/2024 12:39 AM    BUN 16 05/14/2024 12:39 AM    CREATININE 0.87 05/14/2024 12:39 AM    CREATININE 1.06 10/08/2012 08:32 AM         Labs not explicitly included in this progress note were reviewed by the author.   Radiology/imaging not explicitly included in this progress note was reviewed by the author.     Medications reviewed, Labs reviewed and Radiology images reviewed

## (undated) DEVICE — GLOVE BIOGEL PI ULTRATOUCH SZ 6.0 SURGICAL PF LF - (50PR/BX 4BX/CA)

## (undated) DEVICE — IMMOBILIZER, SHOULDER (UNIVERS)

## (undated) DEVICE — GOWN SURGICAL X-LARGE ULTRA - FILM-REINFORCED (20/CA)

## (undated) DEVICE — NEPTUNE 4 PORT MANIFOLD - (20/PK)

## (undated) DEVICE — TUBING PATIENT W/CONNECTOR REDEUCE (1EA)

## (undated) DEVICE — BAG, SPONGE COUNT 50600

## (undated) DEVICE — GLOVE BIOGEL SZ 7.5 SURGICAL PF LTX - (50PR/BX 4BX/CA)

## (undated) DEVICE — SLEEVE, VASO, THIGH, MED

## (undated) DEVICE — PACK SHOULDER ARTHROSCOPY SM - (2EA/CA)

## (undated) DEVICE — TUBING PUMP WITH CONNECTOR REDEUCE (1EA)

## (undated) DEVICE — WATER IRRIG. STER 3000 ML - (4/CA)

## (undated) DEVICE — ELECTRODE DUAL RETURN W/ CORD - (50/PK)

## (undated) DEVICE — SODIUM CHL. IRRIGATION 0.9% 3000ML (4EA/CA 65CA/PF)

## (undated) DEVICE — SUTURE 4-0 CHROMIC GUTSH 27 (36PK/BX)"

## (undated) DEVICE — GOWN SURGEONS X-LARGE - DISP. (30/CA)

## (undated) DEVICE — HEAD HOLDER JUNIOR/ADULT

## (undated) DEVICE — SPONGE GAUZESTER 4 X 4 4PLY - (128PK/CA)

## (undated) DEVICE — HUMID-VENT HEAT AND MOISTURE EXCHANGE- (50/BX)

## (undated) DEVICE — SET IRRIGATION CYSTOSCOPY Y-TYPE L81 IN (20EA/CA)

## (undated) DEVICE — GLOVE BIOGEL PI INDICATOR SZ 6.5 SURGICAL PF LF - (50/BX 4BX/CA)

## (undated) DEVICE — SHAVER4.0 AGGRESSIVE + FORMLA (5EA/BX)

## (undated) DEVICE — SENSOR OXIMETER ADULT SPO2 RD SET (20EA/BX)

## (undated) DEVICE — TAPE CLOTH MEDIPORE 6 INCH - (12RL/CA)

## (undated) DEVICE — GLOVE BIOGEL PI ORTHO SZ 8 PF LF (40PR/BX)

## (undated) DEVICE — TUBE CONNECTING SUCTION - CLEAR PLASTIC STERILE 72 IN (50EA/CA)

## (undated) DEVICE — GOWN WARMING STANDARD FLEX - (30/CA)

## (undated) DEVICE — SET EXTENSION WITH 2 PORTS (48EA/CA) ***PART #2C8610 IS A SUBSTITUTE*****

## (undated) DEVICE — ATHLETIC SUPPORTER LARGE - (1/EA)

## (undated) DEVICE — SUTURE GENERAL

## (undated) DEVICE — SUTURE 2-0 PDS PLUS SH 27 (36EA/BX)"

## (undated) DEVICE — BASKET ZERO 1.9FR X 120CM

## (undated) DEVICE — TUBE E-T HI-LO CUFF 7.0MM (10EA/PK)

## (undated) DEVICE — CANISTER SUCTION RIGID RED 1500CC (40EA/CA)

## (undated) DEVICE — GLOVE, LITE (PAIR)

## (undated) DEVICE — GLOVE BIOGEL PI ORTHO SZ 7 PF LF (40PR/BX)

## (undated) DEVICE — TOWEL STOP TIMEOUT SAFETY FLAG (40EA/CA)

## (undated) DEVICE — MASK ANESTHESIA ADULT  - (100/CA)

## (undated) DEVICE — SHEATH URET ACCESS 10/12FX35 - (6/BX)

## (undated) DEVICE — DRAPE TABLE UROLOGY DRAINAGE (20EA/BX)

## (undated) DEVICE — SENSOR SPO2 NEO LNCS ADHESIVE (20/BX) SEE USER NOTES

## (undated) DEVICE — SODIUM CHL IRRIGATION 0.9% 1000ML (12EA/CA)

## (undated) DEVICE — CONTAINER SPECIMEN BAG OR - STERILE 4 OZ W/LID (100EA/CA)

## (undated) DEVICE — COVER LIGHT HANDLE FLEXIBLE - SOFT (2EA/PK 80PK/CA)

## (undated) DEVICE — JELLY SURGILUBE STERILE FOIL 5 GM (150EA/BX)

## (undated) DEVICE — PACK CYSTOSCOPY III - (8/CA)

## (undated) DEVICE — CHLORAPREP 26 ML APPLICATOR - ORANGE TINT(25/CA)

## (undated) DEVICE — LACTATED RINGERS INJ 1000 ML - (14EA/CA 60CA/PF)

## (undated) DEVICE — GLOVE BIOGEL SZ 8 SURGICAL PF LTX - (50PR/BX 4BX/CA)

## (undated) DEVICE — SUCTION INSTRUMENT YANKAUER BULBOUS TIP W/O VENT (50EA/CA)

## (undated) DEVICE — DRAPE LAPAROTOMY T SHEET - (12EA/CA)

## (undated) DEVICE — WATER IRRIGATION STERILE 1000ML (12EA/CA)

## (undated) DEVICE — COVER LIGHT HANDLE ALC PLUS DISP (18EA/BX)

## (undated) DEVICE — CATHETER URET DUAL LUMEN

## (undated) DEVICE — SHAVER, 5.5 RESECTOR

## (undated) DEVICE — SUTURE 2-0 PROLENE SH (36PK/BX)

## (undated) DEVICE — WIRE GUIDE SENSOR DUAL FLEX - 5/BX

## (undated) DEVICE — SYRINGE 30 ML LL (56/BX)

## (undated) DEVICE — EVACUATOR BLADDER ELLIK - (10/BX)

## (undated) DEVICE — PROTECTOR ULNA NERVE - (36PR/CA)

## (undated) DEVICE — GLOVE BIOGEL SZ 6.5 SURGICAL PF LTX (50PR/BX 4BX/CA)

## (undated) DEVICE — SUTURE 3-0 VICRYL PLUS SH - 27 INCH (36/BX)

## (undated) DEVICE — KIT ROOM DECONTAMINATION

## (undated) DEVICE — CATHETER URETHRAL OPEN END AXXCESS (10EA/BX)

## (undated) DEVICE — LASER TRAC TIP 200 MIRCON FOR 100 WATT LASER

## (undated) DEVICE — BLOCK

## (undated) DEVICE — ELECTRODE 850 FOAM ADHESIVE - HYDROGEL RADIOTRNSPRNT (50/PK)

## (undated) DEVICE — BAG SPONGE COUNT 10.25 X 32 - BLUE (250/CA)

## (undated) DEVICE — CANISTER SUCTION 3000ML MECHANICAL FILTER AUTO SHUTOFF MEDI-VAC NONSTERILE LF DISP  (40EA/CA)

## (undated) DEVICE — DRAPETIBURON SHOULDER W/POUCH - (5EA/CA)

## (undated) DEVICE — DRAIN PENROSE STERILE 1/4 X - 18 IN  (25EA/BX)

## (undated) DEVICE — PACK MINOR BASIN - (2EA/CA)

## (undated) DEVICE — TRAY SKIN SCRUB PVP WET (20EA/CA) PART #DYND70356 DISCONTINUED

## (undated) DEVICE — KIT ANESTHESIA W/CIRCUIT & 3/LT BAG W/FILTER (20EA/CA)

## (undated) DEVICE — SUTURE 2-0 SILK 12 X 18" (36PK/BX)"

## (undated) DEVICE — SUTURE 3-0 ETHILON FS-1 - (36/BX) 30 INCH

## (undated) DEVICE — CANNULA TWIST IN 8.25MM X 7CM (5/BX)

## (undated) DEVICE — SUTURE 3-0 CHROMIC GUT SH 27 (36PK/BX)"

## (undated) DEVICE — SPIDER SHOULDER HOLDER (12EA/BX)

## (undated) DEVICE — TUBING CLEARLINK DUO-VENT - C-FLO (48EA/CA)

## (undated) DEVICE — CANNULA KIT UNIV GREY - (10/BX)

## (undated) DEVICE — ELECTRODE BIPOLAR SMALL CUTTING LOOP URO 24/26 FR (6EA/PK)

## (undated) DEVICE — ARTHROWAND TURBOVAC 3.5/90 SCT

## (undated) DEVICE — GAUZE FLUFF STERILE 2-PLY 36 X 36 (100EA/CA)